# Patient Record
Sex: FEMALE | Race: BLACK OR AFRICAN AMERICAN | Employment: UNEMPLOYED | URBAN - METROPOLITAN AREA
[De-identification: names, ages, dates, MRNs, and addresses within clinical notes are randomized per-mention and may not be internally consistent; named-entity substitution may affect disease eponyms.]

---

## 2018-03-28 ENCOUNTER — OFFICE VISIT (OUTPATIENT)
Dept: FAMILY MEDICINE CLINIC | Age: 28
End: 2018-03-28

## 2018-03-28 VITALS
BODY MASS INDEX: 31.05 KG/M2 | HEIGHT: 56 IN | TEMPERATURE: 98.6 F | SYSTOLIC BLOOD PRESSURE: 123 MMHG | HEART RATE: 67 BPM | WEIGHT: 138 LBS | DIASTOLIC BLOOD PRESSURE: 64 MMHG

## 2018-03-28 DIAGNOSIS — G56.03 CARPAL TUNNEL SYNDROME ON BOTH SIDES: Primary | ICD-10-CM

## 2018-03-28 DIAGNOSIS — Z23 ENCOUNTER FOR IMMUNIZATION: ICD-10-CM

## 2018-03-28 DIAGNOSIS — G44.201 INTRACTABLE TENSION-TYPE HEADACHE, UNSPECIFIED CHRONICITY PATTERN: ICD-10-CM

## 2018-03-28 RX ORDER — IBUPROFEN 600 MG/1
600 TABLET ORAL 2 TIMES DAILY
Qty: 60 TAB | Refills: 0 | Status: SHIPPED | OUTPATIENT
Start: 2018-03-28

## 2018-03-28 RX ORDER — AMITRIPTYLINE HYDROCHLORIDE 10 MG/1
10 TABLET, FILM COATED ORAL
Qty: 30 TAB | Refills: 1 | Status: SHIPPED | OUTPATIENT
Start: 2018-03-28

## 2018-03-28 NOTE — PATIENT INSTRUCTIONS
Síndrome del chetan hodges: Instrucciones de cuidado - [ Carpal Tunnel Syndrome: Care Instructions ]  Instrucciones de cuidado    El síndrome del chetan hodges es un problema nervioso. Puede causar hormigueo, entumecimiento, debilidad o [employer] dedos, el pulgar y la [place]. El nervio mediano y varios tejidos fibrosos, llamados tendones, atraviesan la zuly por un espacio denominado chetan hodges. El movimiento repetido de las ruben al trabajar o practicar ciertos pasatiempos y deportes puede hacer presión sobre el nervio. El embarazo y ciertas afecciones médicas, edu la diabetes, la artritis y [place] tiroides hipoactiva, también pueden causar el síndrome del chetan hodges. Para reducir los síntomas, usted podría limitar [facility] o [facility]. También puede flash otras medidas para sentirse mejor. Si los síntomas son leves, es probable que pueda aliviar el dolor con 1 o 2 semanas de tratamiento en el [facility]. La cirugía es necesaria solo si otros tratamientos no funcionan. La atención de seguimiento es lila parte clave de robin tratamiento y seguridad. Asegúrese de hacer y acudir a todas las citas, y llame a robin médico si está teniendo problemas. También es lila buena idea saber los resultados de los exámenes y mantener lila lista de los medicamentos que kendall. ¿Cómo puede cuidarse en el hogar? · Si es posible, interrumpa o disminuya la actividad que causa los síntomas. Si no puede suspenderla, elayne pausas frecuentes para descansar y estirarse o cambie la posición de las ruben para hacer lila tarea. Trate de [facility], edu cuando [place] el ratón de lila computadora. · Trate de evitar doblar o rotar las muñecas. · Pregúntele a robin médico si puede flash un analgésico (medicamento para el dolor) de venta mayra, edu acetaminofén (Tylenol), ibuprofeno (Advil, Motrin) o naproxeno (Aleve). Sea conchis con los medicamentos. Becka y siga todas las instrucciones de la [place].   · Si robin médico le receta corticosteroides para ayudar a aliviar el dolor y reducir la hinchazón, tómelos exactamente edu le fueron recetados. Llame a robin médico si joshua estar teniendo problemas con robin medicamento. · Colóquese hielo o lila compresa fría sobre la [place] de 10 a 20 minutos cada vez para aliviar el dolor. Póngase un paño jean entre el hielo y la piel. · Si robin médico o robin fisioterapeuta o terapeuta ocupacional le indica que use lila tablilla (férula) para la [place], [place] según las indicaciones para mantener la [place] en lila posición neutra. [place] también reduce la presión sobre robin nervio mediano. · Pregúntele a robin médico si debería hacer sesiones de fisioterapia o de terapia ocupacional para aprender a hacer las tareas de [employer]. · [place] clases de yoga para estirar los músculos y fortalecer las ruben y las Amanda. El yoga ha [employer] síntomas del túnel carpiano. Cómo prevenir el síndrome del túnel carpiano  · Cuando trabaje en la computadora, R[address]e [address] y las muñecas alineadas con los antebrazos. Mantenga los codos cerca de lizzy costados. [place] un descanso con lila frecuencia de 10 a 15 minutos. · Trate de hacer los siguientes ejercicios:  ¨ Calentamiento: Gire la zuly hacia arriba, [place] y de un lado a otro. Repita esto 4 veces. Estire [employer] dedos, relájelos y vuelva a estirarlos. Repita 4 veces. Estire el pulgar doblándolo levemente hacia atrás, manténgalo en herrera posición y relájelo. Repita 4 veces. ¨ Estiramiento con los [employer] en posición de orar: Comience colocando las maliha de las ruben juntas darrion del pecho, juanito debajo del [place]. Baje las ruben lentamente hacia la línea de la cintura y manténgalas cerca del estómago con las maliha juntas hasta que sienta un estiramiento leve a moderado debajo de los antebrazos. Mantenga la posición entre 10 y 21 segundos. Repita 4 veces.   ¨ Estiramiento del flexor de la zuly: Extienda el Korey Schooner frente a usted, con la anthony [place]. Doble la zuly y apunte con la mano hacia el piso. Con la [employer], doble con suavidad la zuly aún más hasta que sienta un estiramiento entre leve y moderado en el antebrazo. Mantenga la posición entre 10 y 21 segundos. Repita 4 veces. ¨ Estiramiento del extensor de la zuly: Repita los pasos para el estiramiento del flexor de la zuly ramírez comience con la anthony extendida Raenelle Meckel. · Apriete lila pelota de goma varias veces al día para mantener steven las ruben y los dedos. · Evite sostener objetos (edu un libro) en la misma posición bennie mucho tiempo. Siempre que sea posible, utilice toda la mano para flash un objeto. Si [place] solo el pulgar y el dedo índice puede tensionar la [place]. · No fume. Puede empeorar esta afección ya que reduce el flujo de naye hacia el nervio El paso. Si necesita ayuda para dejar de fumar, hable con robin médico sobre programas y medicamentos para dejar de fumar. Estos pueden aumentar lizzy probabilidades de dejar el hábito para siempre. ¿Cuándo debe pedir ayuda? Preste especial atención a los cambios de robin kobe y asegúrese de comunicarse con robin médico si:  ? · El dolor u otros problemas no mejoran con los cuidados en el hogar. ? · Desea más información sobre la fisioterapia o la terapia ocupacional.   ? · Tiene efectos secundarios producidos por los corticosteroides, tales edu:  ¨ Aumento de [place]. ¨ Cambios en el estado de ánimo. ¨ Problemas para dormir. ¨ Fácil formación de moretones. ? · Tiene otros problemas con los medicamentos. ¿Dónde puede encontrar más información en inglés? Darin Bowen a http://xavier-fer.info/. [place] R432 en la búsqueda para aprender más acerca de \"Síndrome del túnel carpiano: Instrucciones de cuidado - [ Carpal Tunnel Syndrome: Care Instructions ]. \"  Revisado: 21 marzo, 2017  Versión del contenido: 11.4  © 1541-9292 Healthwise, Incorporated.  Las instrucciones de cuidado fueron adaptadas bajo licencia por [employer] (which disclaims liability or warranty for this information). Si usted tiene [place] afección médica o sobre estas instrucciones, siempre pregunte a robin profesional de kobe. [facility] niega toda garantía o responsabilidad por robin uso de esta información.

## 2018-03-28 NOTE — PROGRESS NOTES
At discharge station AVS was printed and reviewed with pt. Gave vaccine per protocol. Documented in [address]. Gave patient VIS information sheet and reviewed instructions regarding possible adverse side effects and allergic reactions. No adverse reaction noted at time of discharge.  Christiano Lyn RN

## 2018-03-28 NOTE — PROGRESS NOTES
Assessment/Plan:       ICD-10-CM ICD-9-CM    1. Carpal tunnel syndrome on both sides G56.03 354.0 ibuprofen (MOTRIN) 600 mg tablet   2. Intractable tension-type headache, unspecified chronicity pattern G44.201 339.10 amitriptyline (ELAVIL) 10 mg tablet   3. Encounter for immunization Z23 V03.89 INFLUENZA VIRUS VAC QUAD,SPLIT,PRESV FREE SYRINGE IM     Follow-up Disposition:  Return if symptoms worsen or fail to improve. [address] [facility], [address] [id number]  Phone: [phone] Fax: [phone]      [facility] [address] [facility]  Subjective:     Chief Complaint   Patient presents with    Headache    Numbness     both arms    Immunization/Injection    Austin Canseco is a 32 y.o. OTHER female. HPI:   Headache: takes tylenol but headache doesn't go away. Started 2 months ago. Back of her neck hurts and it feels \"hot\". Headache is more during the day. 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 Numbness, both arms: numbness at night, started 3 months ago. It is getting worse. Wakes her up. Nothing tried. The wood is light. Stands with her arms extended not quite to 90 degrees  8 hours a day. She  has no past medical history on file. Review of Systems: Negative for: fever, chest pain, shortness of breath, leg swelling, exertional dyspnea, palpitations. Current Medications:   No current outpatient prescriptions on file prior to visit. No current facility-administered medications on file prior to visit. Social History: She  reports that she has never smoked. She has never used smokeless tobacco. She reports that she does not drink alcohol or use illicit drugs. Objective:     Vitals:    03/28/18 0948   BP: 123/64   Pulse: 67   Temp: 98.6 °F (37 °C)   Weight: 138 lb (62.6 kg)   Height: 4' 8.5\" (1.435 m)    Patient's last menstrual period was 03/25/2018. Wt Readings from Last 2 Encounters:   03/28/18 138 lb (62.6 kg)     No results found for any visits on 03/28/18. Physical Examination:  General appearance - well developed, no acute distress. Chest - clear to auscultation. Heart - regular rate and rhythm without murmurs, rubs, or gallops. Abdomen - bowel sounds present x 4, NT, ND. Extremities - pulses intact. No peripheral edema. Assessment/Plan:   Diagnoses and all orders for this visit:    1. Carpal tunnel syndrome on both sides  -     ibuprofen (MOTRIN) 600 mg tablet; Take 1 Tab by mouth two (2) times a day. For pain. Thai sig    2. Intractable tension-type headache, unspecified chronicity pattern  -     amitriptyline (ELAVIL) 10 mg tablet; Take 1 Tab by mouth nightly. For headache; Thai sig    3. Encounter for immunization  -     Influenza virus vaccine (QUADRIVALENT PRES FREE SYRINGE) IM (24953)      Follow-up Disposition:  Return if symptoms worsen or fail to improve. Karen Degroot, DNP, FNP-BC, BC-ADM  Marie Wu expressed understanding of this plan.

## 2019-07-22 ENCOUNTER — HOSPITAL ENCOUNTER (EMERGENCY)
Age: 29
Discharge: HOME | End: 2019-07-22
Attending: EMERGENCY MEDICINE
Payer: COMMERCIAL

## 2019-07-22 ENCOUNTER — APPOINTMENT (OUTPATIENT)
Dept: CT IMAGING | Age: 29
End: 2019-07-22
Attending: PHYSICIAN ASSISTANT
Payer: COMMERCIAL

## 2019-07-22 VITALS
WEIGHT: 148 LBS | RESPIRATION RATE: 20 BRPM | TEMPERATURE: 99.2 F | HEART RATE: 95 BPM | DIASTOLIC BLOOD PRESSURE: 81 MMHG | OXYGEN SATURATION: 98 % | HEIGHT: 57 IN | SYSTOLIC BLOOD PRESSURE: 124 MMHG | BODY MASS INDEX: 31.93 KG/M2
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
## 2019-07-22 DIAGNOSIS — S09.90XA CLOSED HEAD INJURY, INITIAL ENCOUNTER: ICD-10-CM

## 2019-07-22 DIAGNOSIS — S01.01XA LACERATION OF SCALP, INITIAL ENCOUNTER: ICD-10-CM

## 2019-07-22 PROCEDURE — 70450 CT HEAD/BRAIN W/O DYE: CPT

## 2019-07-22 PROCEDURE — 90715 TDAP VACCINE 7 YRS/> IM: CPT | Performed by: PHYSICIAN ASSISTANT

## 2019-07-22 PROCEDURE — 75810000293 HC SIMP/SUPERF WND  RPR

## 2019-07-22 PROCEDURE — 90471 IMMUNIZATION ADMIN: CPT

## 2019-07-22 PROCEDURE — 74011250636 HC RX REV CODE- 250/636: Performed by: PHYSICIAN ASSISTANT

## 2019-07-22 PROCEDURE — 96372 THER/PROPH/DIAG INJ SC/IM: CPT

## 2019-07-22 PROCEDURE — 77030008460 HC STPLR SKN PRECIS 3M -A

## 2019-07-22 PROCEDURE — 75810000275 HC EMERGENCY DEPT VISIT NO LEVEL OF CARE

## 2019-07-22 PROCEDURE — 99284 EMERGENCY DEPT VISIT MOD MDM: CPT

## 2019-07-22 PROCEDURE — 77030018836 HC SOL IRR NACL ICUM -A

## 2019-07-22 RX ORDER — KETOROLAC TROMETHAMINE 30 MG/ML
30 INJECTION, SOLUTION INTRAMUSCULAR; INTRAVENOUS
Status: COMPLETED | OUTPATIENT
Start: 2019-07-22 | End: 2019-07-22

## 2019-07-22 RX ORDER — HYDROCODONE BITARTRATE AND ACETAMINOPHEN 5; 325 MG/1; MG/1
1 TABLET ORAL
Qty: 10 TAB | Refills: 0 | Status: SHIPPED | OUTPATIENT
Start: 2019-07-22 | End: 2019-07-25

## 2019-07-22 RX ORDER — IBUPROFEN 800 MG/1
800 TABLET ORAL
Qty: 20 TAB | Refills: 0 | Status: SHIPPED | OUTPATIENT
Start: 2019-07-22 | End: 2019-07-29

## 2019-07-22 RX ORDER — ONDANSETRON 4 MG/1
4 TABLET, ORALLY DISINTEGRATING ORAL
Qty: 15 TAB | Refills: 0 | Status: SHIPPED | OUTPATIENT
Start: 2019-07-22

## 2019-07-22 RX ORDER — HYDROCODONE BITARTRATE AND ACETAMINOPHEN 5; 325 MG/1; MG/1
1 TABLET ORAL
Status: DISCONTINUED | OUTPATIENT
Start: 2019-07-22 | End: 2019-07-23 | Stop reason: HOSPADM

## 2019-07-22 RX ADMIN — TETANUS TOXOID, REDUCED DIPHTHERIA TOXOID AND ACELLULAR PERTUSSIS VACCINE, ADSORBED 0.5 ML: 5; 2.5; 8; 8; 2.5 SUSPENSION INTRAMUSCULAR at 22:03

## 2019-07-22 RX ADMIN — KETOROLAC TROMETHAMINE 30 MG: 30 INJECTION, SOLUTION INTRAMUSCULAR; INTRAVENOUS at 20:34
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
## 2019-07-22 NOTE — LETTER
[facility] EMERGENCY DEPT 
[address] [address] 
[phone] Work/School Note Date: 7/22/2019 To Whom It May concern: 
 
Ai De Jesus was seen and treated today in the emergency room by the following provider(s): 
Attending Provider: Monica Davis MD 
Physician Assistant: Carmelo Flores. Ai De Jesus may return to work in 3 days Sincerely, 
 
 
 
 
RBEANNA Fountain

## 2019-07-22 NOTE — ED NOTES
Forensics RN, Enrrique Denton, states it may be 45 mins before she arrives in ED.   Chelsey Martinez stated she will call ART
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
## 2019-07-23 NOTE — DISCHARGE INSTRUCTIONS
Patient Education        Aprenda sobre lila lesión cerrada en la [place] - [ Kathleen Zeigler About a Closed Head Injury ]  ¿Qué es lila lesión cerrada en Dani Ag? Lila lesión cerrada en la shyanne ocurre cuando la shyanne recibe un golpe remington. La intensa fuerza del golpe hace que el cerebro se sacuda dentro del cráneo. Jenae movimiento puede hacer que el cerebro se magulle, se hinche o se desgarre. A veces, los nervios o los vasos sanguíneos también se dañan. [place] puede provocar sangrado dentro del cerebro o alrededor de él. [place] conmoción cerebral es un tipo de lesión cerrada en la shyanne. ¿Cuáles son los síntomas? Si usted tiene lila conmoción leve, podría tener un ligero dolor de shyanne o sentirse \"no del todo jaswinder\". Estos síntomas son comunes. Suelen desaparecer entre unos pocos días y 4 semanas después. Bakari, a veces, es posible que después de lila conmoción cerebral usted se sienta edu si no pudiera funcionar nguyen jaswinder edu antes de la lesión. Y tiene nuevos síntomas. [place] se llama síndrome posconmocional. Usted puede:  · Tener lila mayor dificultad para resolver problemas, pensar, concentrarse o recordar. · [employer] de Nano Clemons. · Tener cambios en lizzy patrones de sueño, edu no poder dormir o dormir todo el tiempo. · Tener cambios de personalidad. · No tener interés en las actividades habituales. · Sentirse enojado o ansioso sin motivo hansel. · Perder el sentido del gusto o del olfato. · Estar mareado, aturdido o con dificultades para mantener el equilibrio. Podría resultarle difícil estar de pie o caminar. ¿Cómo se trata lila lesión cerrada en la shyanne? Cualquier persona que pueda tener lila conmoción cerebral necesita ricarda a un médico. Algunas personas tienen que permanecer en el hospital para estar en observación. Otras pueden volver a robin hogar de manera parker.  Si usted vuelve a robin hogar, siga las instrucciones de robin médico. Él o kerwin le dirá si necesita que alguien lo vigile atentamente [employer] siguientes 24 horas o [employer]. El descanso es el mejor tratamiento. Duerma lo suficiente por la noche. Y trate de descansar bennie el día. · Evite las actividades que requieran un esfuerzo físico o mental. Estas incluyen las tareas domésticas y escolares, así edu el ejercicio. Y evite jugar videojuegos, enviar mensajes de texto o usar la computadora. Es posible que deba cambiar robin horario escolar o laboral para poder evitar estas actividades. · Pregúntele a robin médico cuándo va a poder conducir, montar en bicicleta u operar maquinaria. · [place] un analgésico (medicamento para el dolor) de venta mayra, edu acetaminofén (Tylenol), ibuprofeno (Advil, Motrin) o naproxeno (Aleve). Sea conchis con los medicamentos. Becka y siga todas las instrucciones de la [place]. · Consulte a robin médico antes de flash cualquier otro medicamento para el dolor. · No nancy alcohol ni consuma drogas ilegales. Pueden retrasar la sanación. También pueden aumentar el riesgo de sufrir lila segunda lesión en la shyanne. La atención de seguimiento es lila parte clave de robin tratamiento y seguridad. Asegúrese de hacer y acudir a todas las citas, y llame a robin médico si está teniendo problemas. También es lila buena idea saber los resultados de lizzy exámenes y mantener lila lista de los medicamentos que kendall. ¿Dónde puede encontrar más información en inglés? Muna Kemp a http://xavier-fer.info/. Escriba E235 en la búsqueda para aprender más acerca de \"Aprenda sobre lila lesión cerrada en la [place] - [ Learning About a Closed Head Injury ]. \"  Revisado: 28 marzo, 2019  Versión del contenido: 12.1  © 8702-4209 Healthwise, Incorporated. Las instrucciones de cuidado fueron adaptadas bajo licencia por Good Help Connections (which disclaims liability or warranty for this information). Si usted tiene [place] afección médica o sobre estas instrucciones, siempre pregunte a robin profesional de kobe.  [employer] niega toda garantía o responsabilidad por robin uso de esta información. Patient Education        Ana Luisa Fuller con grapas: Instrucciones de cuidado - [ Cuts Closed With Staples: Care Instructions ]  Instrucciones de cuidado  Un brandi puede ocurrir en cualquier parte del cuerpo. El médico utilizó grapas para cerrar el brandi. Las grapas sirven para cerrar el brandi fácil y rápidamente, lo que ayuda a sanar el brandi. A veces, un brandi puede lesionar tendones, vasos sanguíneos o nervios. Si el brandi fue profundo y a través de la piel, el médico puede neri hecho lila capa de puntos por debajo de las grapas. En la capa más profunda de puntos, se juntan las partes profundas del brandi. Esos puntos se disolverán, y no es necesario quitarlos. Las grapas de la capa superior son lo que ve en el brandi. Es posible que le pongan lila venda. Será necesario que [employer] grapas, por lo general entre 7 y 15 días. El médico lo spann revisado [facility] problemas pueden aparecer más tarde. Si observa algún problema o un síntoma nuevo, obtenga tratamiento médico de inmediato. La atención de seguimiento es lila parte clave de robin tratamiento y seguridad. Asegúrese de hacer y acudir a todas las citas, y llame a robin médico si está teniendo problemas. También es lila buena idea saber los resultados de lizzy exámenes y mantener lila lista de los medicamentos que kendall. ¿Cómo puede cuidarse en el hogar? · Mantenga seco el brandi bennie las primeras 24 o 50 horas. Después de esto, puede ducharse si robin médico lo aprueba. Seque el brandi con toques suaves de toalla. · No sumerja el brandi, edu, por ejemplo, en lila bañera. Robin médico le dirá cuándo es seguro mojar el brandi. · Si robin médico le dijo cómo cuidarse el brandi, siga las instrucciones de robin médico. Si no le shawn instrucciones, siga estos consejos generales:  ? Después de las primeras 24 a 48 horas, lave la annelise alrededor del brandi con agua limpia 2 veces al día.  No use peróxido de hidrógeno [place]) ni alcohol, los cuales pueden retrasar la sanación. ? Puede cubrir el brandi con lila capa delgada de vaselina y Julia Beery venda no adherente. ? Aplíquese más vaselina y [facility] la venda según sea necesario. · Evite cualquier actividad que podría hacer que el brandi se vuelva a abrir. · No se quite las grapas usted mismo. Torres médico le dirá cuándo regresar para que le quiten las grapas. · [employer] analgésicos (medicamentos para el dolor) exactamente según lo indicado. ? Si el [employer] shawn un analgésico recetado, [employer] se lo recetó.  ? Si usted no está tomando un analgésico recetado, pregúntele a torres médico si puede flash un medicamento de [employer]. ¿Cuándo debe pedir ayuda? Llame a torres médico ahora mismo o busque atención médica inmediata si:    · Siente un dolor nuevo o el dolor empeora.     · La piel cercana al brandi está fría o pálida, o cambia de color.     · Siente hormigueo, debilitamiento o entumecimiento cerca del brandi.     · El brandi comienza a sangrar, y la naye empapa la venda. Es normal que salgan pequeñas cantidades de [place].     · Tiene dificultades para  la annelise cercana al brandi.     · Tiene síntomas de infección, tales edu:  ? Aumento del dolor, la hinchazón, el enrojecimiento o la temperatura alrededor del brandi. ? Vetas rojizas que salen del brandi. ? Pus que sale del brandi. ? [employer] Dash especial atención a los cambios en torres kobe y asegúrese de comunicarse con torres médico si:    · No mejora edu se esperaba. ¿Dónde puede encontrar más información en inglés? Brandy Gomez a http://xavier-fer.info/. Aislinn Mcdonald U881 en la búsqueda para aprender más acerca de \"[place] cerrados con grapas: Instrucciones de cuidado - [ Cuts Closed With Staples: Care Instructions ]. \"  Revisado: 23 septiembre, 2018  Versión del contenido: 12.1  © 6116-2031 Healthwise, Incorporated.  Las instrucciones de cuidado fueron adaptadas bajo licencia por [facility] (which disclaims liability or warranty for this information). Si usted tiene [place] afección médica o sobre estas instrucciones, siempre pregunte a robin profesional de kobe. [facility] niega toda garantía o responsabilidad por robin uso de esta información.

## 2019-07-23 NOTE — ED NOTES
Discharge instructions were given to the patient by Silvia Umaña.     The patient left the Emergency Department ambulatory, alert and oriented and in no acute distress with 3 prescriptions. The patient was encouraged to call or return to the ED for worsening issues or problems and was encouraged to schedule a follow up appointment for continuing care. The patient verbalized understanding of discharge instructions and prescriptions, all questions were answered. The patient has no further concerns at this time.
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
## 2019-08-04 ENCOUNTER — HOSPITAL ENCOUNTER (EMERGENCY)
Age: 29
Discharge: HOME | End: 2019-08-04
Attending: EMERGENCY MEDICINE
Payer: SELF-PAY

## 2019-08-04 VITALS
TEMPERATURE: 98 F | HEART RATE: 72 BPM | SYSTOLIC BLOOD PRESSURE: 113 MMHG | WEIGHT: 144.31 LBS | RESPIRATION RATE: 16 BRPM | OXYGEN SATURATION: 99 % | BODY MASS INDEX: 31.13 KG/M2 | DIASTOLIC BLOOD PRESSURE: 66 MMHG | HEIGHT: 57 IN

## 2019-08-04 DIAGNOSIS — Z48.02 ENCOUNTER FOR STAPLE REMOVAL: Primary | ICD-10-CM

## 2019-08-04 PROCEDURE — 75810000275 HC EMERGENCY DEPT VISIT NO LEVEL OF CARE
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
## 2019-08-04 NOTE — ED NOTES
Pt tolerated procedure and accepted plan of care. Pt left unit steady gait. No bleeding noted at site. Patient (s)  given copy of dc instructions and 0 script(s). Patient (s)  verbalized understanding of instructions and script (s). Patient given a current medication reconciliation form and verbalized understanding of their medications. Patient (s) verbalized understanding of the importance of discussing medications with  his or her physician or clinic they will be following up with. Patient alert and oriented and in no acute distress. Patient discharged home ambulatory with self.

## 2019-08-04 NOTE — ED NOTES
Pt here for removal of  5 staples to her head. Pt is A+Ox3 clear speaking. Emergency Department Nursing Plan of Care       The Nursing Plan of Care is developed from the Nursing assessment and Emergency Department Attending provider initial evaluation. The plan of care may be reviewed in the ED Provider note.     The Plan of Care was developed with the following considerations:   Patient / Family readiness to learn indicated by:verbalized understanding  Persons(s) to be included in education: patient  Barriers to Learning/Limitations:No    Signed     Chad Trinidad RN    8/4/2019   1:28 PM

## 2019-08-04 NOTE — DISCHARGE INSTRUCTIONS
Patient Education        [place]era [place] de la retirada de puntos de sutura y grapas - [ [place] and [place]ecenia Removal ]  ¿Cuándo se retiran los puntos de sutura y las grapas? Robin médico le indicará cuándo deben [employer] puntos de sutura o las grapas, por lo general al cabo de 7 a 14 shaina. El tiempo que tenga que esperar dependerá de cosas edu la ubicación de la herida, el tamaño y la profundidad de la herida y robin estado de kobe general. No se quite los puntos de sutura usted mismo. Los puntos en la chelle se suelen retirar al cabo de lila semana. Bakari los puntos y las grapas en otras zonas del cuerpo, edu en la espalda o el abdomen o sobre lila articulación, podrían tener que permanecer en robin lugar por más [place], frecuentemente lila o [place]. Asegúrese de seguir las instrucciones de robin médico.  ¿Cómo se retiran los puntos de sutura y las grapas? Generalmente no duele cuando el médico retira los puntos de sutura o las grapas. Usted puede sentir un pequeño tirón cuando se saca cada punto o grapa. · Estará sentado o recostado. · Para retirar los puntos de sutura, el médico utilizará tijeras para cortar cada kyle de los nudos y [employer] [facility] hilos. · Para retirar las grapas, el médico usará un instrumento para sacar las grapas lila por [place]. · La annelise puede estar sensible después de neri retirado los puntos de sutura o las grapas. Bakari debería sentirse mejor al cabo de unos minutos o después de algunas horas. ¿Qué puede esperar después de que le retiren los puntos de sutura o las grapas? Dependiendo del tipo de brandi y de robin ubicación, usted tendrá lila cicatriz. Las cicatrices por lo general son menos visibles con el tiempo. Mantenga limpia la annelise, bakari no se necesita lila venda. ¿Cuándo debe pedir ayuda? Llame a robin médico ahora mismo o busque atención médica inmediata si:  · Siente un dolor nuevo o el dolor empeora.   · Tiene dificultades para  la annelise que rodea la cicatriz. · Tiene síntomas de infección, tales edu:  ? Mayor dolor, hinchazón, enrojecimiento o aumento de la temperatura alrededor de la cicatriz. ? Vetas rojizas que salen de la cicatriz. ? Pus que sale de la cicatriz. ? Fiebre. Preste especial atención a los cambios en robin kobe y asegúrese de comunicarse con robin médico si:  · Se le abre la cicatriz. · No mejora edu se esperaba. La atención de seguimiento es lila parte clave de robin tratamiento y seguridad. Asegúrese de hacer y acudir a todas las citas, y llame a robin médico si está teniendo problemas. También es lila buena idea mantener lila lista de los medicamentos que kendall. ¿Dónde puede encontrar más información en inglés? Franky Arroyo a http://xavier-fer.info/. Fan O814 en la búsqueda para aprender más acerca de \"Aprenda acerca de la retirada de puntos de sutura y grapas - [ Learning About Stitches and Staples Removal ]. \"  Revisado: 23 septiembre, 2018  Versión del contenido: 12.1  © 6092-8752 Healthwise, Incorporated. Las instrucciones de cuidado fueron adaptadas bajo licencia por Good [employer] Connections (which disclaims liability or warranty for this information). Si usted tiene [place] afección médica o sobre estas instrucciones, siempre pregunte a robin profesional de kobe. Healthwise, Incorporated niega toda garantía o responsabilidad por robin uso de esta información.

## 2024-01-26 ENCOUNTER — HOSPITAL ENCOUNTER (INPATIENT)
Facility: HOSPITAL | Age: 34
LOS: 2 days | Discharge: HOME | DRG: 813 | End: 2024-01-29
Attending: EMERGENCY MEDICINE | Admitting: FAMILY MEDICINE

## 2024-01-26 ENCOUNTER — APPOINTMENT (OUTPATIENT)
Facility: HOSPITAL | Age: 34
DRG: 813 | End: 2024-01-26

## 2024-01-26 DIAGNOSIS — E11.65 TYPE 2 DIABETES MELLITUS WITH HYPERGLYCEMIA (HCC): ICD-10-CM

## 2024-01-26 DIAGNOSIS — R31.21 ASYMPTOMATIC MICROSCOPIC HEMATURIA: ICD-10-CM

## 2024-01-26 DIAGNOSIS — R10.31 RIGHT LOWER QUADRANT ABDOMINAL PAIN: ICD-10-CM

## 2024-01-26 DIAGNOSIS — D69.6 THROMBOCYTOPENIA (HCC): Primary | ICD-10-CM

## 2024-01-26 LAB
ALBUMIN SERPL-MCNC: 3.8 G/DL (ref 3.5–5)
ALBUMIN/GLOB SERPL: 0.8 (ref 1.1–2.2)
ALP SERPL-CCNC: 120 U/L (ref 45–117)
ALT SERPL-CCNC: 30 U/L (ref 12–78)
AMPHET UR QL SCN: NEGATIVE
ANION GAP SERPL CALC-SCNC: 5 MMOL/L (ref 5–15)
APPEARANCE UR: CLEAR
AST SERPL-CCNC: 17 U/L (ref 15–37)
BACTERIA URNS QL MICRO: NEGATIVE /HPF
BARBITURATES UR QL SCN: NEGATIVE
BENZODIAZ UR QL: NEGATIVE
BILIRUB SERPL-MCNC: 0.4 MG/DL (ref 0.2–1)
BILIRUB UR QL: NEGATIVE
BUN SERPL-MCNC: 14 MG/DL (ref 6–20)
BUN/CREAT SERPL: 27 (ref 12–20)
CALCIUM SERPL-MCNC: 8.6 MG/DL (ref 8.5–10.1)
CANNABINOIDS UR QL SCN: NEGATIVE
CHLORIDE SERPL-SCNC: 107 MMOL/L (ref 97–108)
CO2 SERPL-SCNC: 25 MMOL/L (ref 21–32)
COCAINE UR QL SCN: NEGATIVE
COLOR UR: ABNORMAL
COMMENT:: NORMAL
CREAT SERPL-MCNC: 0.51 MG/DL (ref 0.55–1.02)
EPITH CASTS URNS QL MICRO: ABNORMAL /LPF
ETHANOL SERPL-MCNC: <10 MG/DL (ref 0–0.08)
GLOBULIN SER CALC-MCNC: 4.5 G/DL (ref 2–4)
GLUCOSE SERPL-MCNC: 130 MG/DL (ref 65–100)
GLUCOSE UR STRIP.AUTO-MCNC: NEGATIVE MG/DL
HCG UR QL: NEGATIVE
HGB UR QL STRIP: ABNORMAL
KETONES UR QL STRIP.AUTO: NEGATIVE MG/DL
LDH SERPL L TO P-CCNC: 186 U/L (ref 81–246)
LEUKOCYTE ESTERASE UR QL STRIP.AUTO: NEGATIVE
LIPASE SERPL-CCNC: 33 U/L (ref 13–75)
Lab: NORMAL
METHADONE UR QL: NEGATIVE
NITRITE UR QL STRIP.AUTO: NEGATIVE
OPIATES UR QL: NEGATIVE
PCP UR QL: NEGATIVE
PH UR STRIP: 6 (ref 5–8)
POTASSIUM SERPL-SCNC: 3.5 MMOL/L (ref 3.5–5.1)
PROT SERPL-MCNC: 8.3 G/DL (ref 6.4–8.2)
PROT UR STRIP-MCNC: NEGATIVE MG/DL
RBC #/AREA URNS HPF: ABNORMAL /HPF (ref 0–5)
SODIUM SERPL-SCNC: 137 MMOL/L (ref 136–145)
SP GR UR REFRACTOMETRY: 1.02 (ref 1–1.03)
SPECIMEN HOLD: NORMAL
SPECIMEN HOLD: NORMAL
UROBILINOGEN UR QL STRIP.AUTO: 0.2 EU/DL (ref 0.2–1)
WBC URNS QL MICRO: ABNORMAL /HPF (ref 0–4)

## 2024-01-26 PROCEDURE — 99285 EMERGENCY DEPT VISIT HI MDM: CPT

## 2024-01-26 PROCEDURE — 6370000000 HC RX 637 (ALT 250 FOR IP): Performed by: STUDENT IN AN ORGANIZED HEALTH CARE EDUCATION/TRAINING PROGRAM

## 2024-01-26 PROCEDURE — 36415 COLL VENOUS BLD VENIPUNCTURE: CPT

## 2024-01-26 PROCEDURE — 83615 LACTATE (LD) (LDH) ENZYME: CPT

## 2024-01-26 PROCEDURE — 80307 DRUG TEST PRSMV CHEM ANLYZR: CPT

## 2024-01-26 PROCEDURE — 81025 URINE PREGNANCY TEST: CPT

## 2024-01-26 PROCEDURE — 96374 THER/PROPH/DIAG INJ IV PUSH: CPT

## 2024-01-26 PROCEDURE — 6360000004 HC RX CONTRAST MEDICATION: Performed by: RADIOLOGY

## 2024-01-26 PROCEDURE — 83690 ASSAY OF LIPASE: CPT

## 2024-01-26 PROCEDURE — 80053 COMPREHEN METABOLIC PANEL: CPT

## 2024-01-26 PROCEDURE — 6360000002 HC RX W HCPCS: Performed by: STUDENT IN AN ORGANIZED HEALTH CARE EDUCATION/TRAINING PROGRAM

## 2024-01-26 PROCEDURE — 82077 ASSAY SPEC XCP UR&BREATH IA: CPT

## 2024-01-26 PROCEDURE — 85025 COMPLETE CBC W/AUTO DIFF WBC: CPT

## 2024-01-26 PROCEDURE — 74177 CT ABD & PELVIS W/CONTRAST: CPT

## 2024-01-26 PROCEDURE — 81001 URINALYSIS AUTO W/SCOPE: CPT

## 2024-01-26 RX ORDER — ONDANSETRON 2 MG/ML
4 INJECTION INTRAMUSCULAR; INTRAVENOUS ONCE
Status: COMPLETED | OUTPATIENT
Start: 2024-01-26 | End: 2024-01-26

## 2024-01-26 RX ORDER — ACETAMINOPHEN 500 MG
1000 TABLET ORAL
Status: COMPLETED | OUTPATIENT
Start: 2024-01-26 | End: 2024-01-26

## 2024-01-26 RX ADMIN — ACETAMINOPHEN 1000 MG: 500 TABLET ORAL at 18:31

## 2024-01-26 RX ADMIN — IOPAMIDOL 100 ML: 755 INJECTION, SOLUTION INTRAVENOUS at 19:14

## 2024-01-26 RX ADMIN — ONDANSETRON 4 MG: 2 INJECTION INTRAMUSCULAR; INTRAVENOUS at 18:31

## 2024-01-26 ASSESSMENT — PAIN DESCRIPTION - FREQUENCY: FREQUENCY: CONTINUOUS

## 2024-01-26 ASSESSMENT — PAIN DESCRIPTION - ORIENTATION
ORIENTATION: ANTERIOR
ORIENTATION: RIGHT

## 2024-01-26 ASSESSMENT — PAIN DESCRIPTION - PAIN TYPE: TYPE: ACUTE PAIN

## 2024-01-26 ASSESSMENT — PAIN DESCRIPTION - DESCRIPTORS
DESCRIPTORS: ACHING
DESCRIPTORS: ACHING

## 2024-01-26 ASSESSMENT — PAIN DESCRIPTION - ONSET: ONSET: PROGRESSIVE

## 2024-01-26 ASSESSMENT — PAIN SCALES - GENERAL
PAINLEVEL_OUTOF10: 7
PAINLEVEL_OUTOF10: 8

## 2024-01-26 ASSESSMENT — PAIN DESCRIPTION - LOCATION
LOCATION: ABDOMEN
LOCATION: ABDOMEN

## 2024-01-26 NOTE — ED TRIAGE NOTES
Triage: Pt arrives ambulatory from home with CC of abdominal bruising, abdominal bloating, and abdominal pain. She reports she has had these symptoms for 5 days. Pt reports some constipation. Endorses nausea without vomiting.

## 2024-01-26 NOTE — ED PROVIDER NOTES
are mis-transcribed.)    MARILYN MEDINA (electronically signed)  Emergency Attending Physician / Physician Assistant / Nurse Practitioner    [facility] Serve Consult for Admission  11:58 PM    ED Room Number: R35/R35  Patient Name and age:  Aixa Barkley 33 y.o.  female  Working Diagnosis:   1. Thrombocytopenia (HCC)    2. Right lower quadrant abdominal pain    3. Asymptomatic microscopic hematuria        COVID-19 Suspicion: No  Sepsis present:  No  Reassessment needed: No  Code Status:  Full Code  Readmission: No  Isolation Requirements: no  Recommended Level of Care: med/surg  Department: [facility] Adult ED - [phone]    Other:  33 y.o. female presents to ED with abdominal pain. Patient reports that for the past 3 days she has had abdominal pain and bloating. She reports that the pain was initially to the right side but now is \"all over\", constant in nature. She also notes increased bruising especially to her stomach for the past 5 days. She denies any inciting injury or trauma. PE notable for two ecchymosis to R side. Labs notable for platelets of 5. Patient with no history of the same. Labs otherwise unremarkable. Notable for hematuria and CT scan showed possible blood products in pelvis. Possible ITP vs TTP             Delma Andrew PA  01/26/24 0341

## 2024-01-27 PROBLEM — D69.6 THROMBOCYTOPENIA (HCC): Status: ACTIVE | Noted: 2024-01-27

## 2024-01-27 LAB
BASOPHILS # BLD: 0.1 K/UL (ref 0–0.1)
BASOPHILS # BLD: 0.1 K/UL (ref 0–0.1)
BASOPHILS NFR BLD: 1 % (ref 0–1)
BASOPHILS NFR BLD: 1 % (ref 0–1)
COMMENT:: NORMAL
DIFFERENTIAL METHOD BLD: ABNORMAL
DIFFERENTIAL METHOD BLD: ABNORMAL
EOSINOPHIL # BLD: 0 K/UL (ref 0–0.4)
EOSINOPHIL # BLD: 0.3 K/UL (ref 0–0.4)
EOSINOPHIL NFR BLD: 0 % (ref 0–7)
EOSINOPHIL NFR BLD: 3 % (ref 0–7)
ERYTHROCYTE [DISTWIDTH] IN BLOOD BY AUTOMATED COUNT: 12.8 % (ref 11.5–14.5)
ERYTHROCYTE [DISTWIDTH] IN BLOOD BY AUTOMATED COUNT: 12.9 % (ref 11.5–14.5)
FOLATE SERPL-MCNC: 40.4 NG/ML (ref 5–21)
HAPTOGLOB SERPL-MCNC: 178 MG/DL (ref 30–200)
HCT VFR BLD AUTO: 34.1 % (ref 35–47)
HCT VFR BLD AUTO: 34.8 % (ref 35–47)
HGB BLD-MCNC: 11.7 G/DL (ref 11.5–16)
HGB BLD-MCNC: 12 G/DL (ref 11.5–16)
IMM GRANULOCYTES # BLD AUTO: 0 K/UL
IMM GRANULOCYTES # BLD AUTO: 0.1 K/UL (ref 0–0.04)
IMM GRANULOCYTES NFR BLD AUTO: 0 %
IMM GRANULOCYTES NFR BLD AUTO: 1 % (ref 0–0.5)
INR PPP: 1 (ref 0.9–1.1)
LYMPHOCYTES # BLD: 1.8 K/UL (ref 0.8–3.5)
LYMPHOCYTES # BLD: 3.9 K/UL (ref 0.8–3.5)
LYMPHOCYTES NFR BLD: 18 % (ref 12–49)
LYMPHOCYTES NFR BLD: 38 % (ref 12–49)
MCH RBC QN AUTO: 29.4 PG (ref 26–34)
MCH RBC QN AUTO: 29.6 PG (ref 26–34)
MCHC RBC AUTO-ENTMCNC: 34.3 G/DL (ref 30–36.5)
MCHC RBC AUTO-ENTMCNC: 34.5 G/DL (ref 30–36.5)
MCV RBC AUTO: 85.3 FL (ref 80–99)
MCV RBC AUTO: 86.3 FL (ref 80–99)
MONOCYTES # BLD: 0.1 K/UL (ref 0–1)
MONOCYTES # BLD: 0.5 K/UL (ref 0–1)
MONOCYTES NFR BLD: 1 % (ref 5–13)
MONOCYTES NFR BLD: 5 % (ref 5–13)
NEUTS BAND NFR BLD MANUAL: 1 % (ref 0–6)
NEUTS SEG # BLD: 5.3 K/UL (ref 1.8–8)
NEUTS SEG # BLD: 8.1 K/UL (ref 1.8–8)
NEUTS SEG NFR BLD: 52 % (ref 32–75)
NEUTS SEG NFR BLD: 79 % (ref 32–75)
NRBC # BLD: 0 K/UL (ref 0–0.01)
NRBC # BLD: 0 K/UL (ref 0–0.01)
NRBC BLD-RTO: 0 PER 100 WBC
NRBC BLD-RTO: 0 PER 100 WBC
PATH REV BLD -IMP: ABNORMAL
PLATELET # BLD AUTO: 5 K/UL (ref 150–400)
PLATELET # BLD AUTO: 6 K/UL (ref 150–400)
PROTHROMBIN TIME: 10.8 SEC (ref 9–11.1)
RBC # BLD AUTO: 3.95 M/UL (ref 3.8–5.2)
RBC # BLD AUTO: 4.08 M/UL (ref 3.8–5.2)
RBC MORPH BLD: ABNORMAL
RETICS # AUTO: 0.12 M/UL (ref 0.02–0.08)
RETICS/RBC NFR AUTO: 3 % (ref 0.7–2.1)
SPECIMEN HOLD: NORMAL
TSH SERPL DL<=0.05 MIU/L-ACNC: 1.13 UIU/ML (ref 0.36–3.74)
VIT B12 SERPL-MCNC: >2000 PG/ML (ref 193–986)
WBC # BLD AUTO: 10.1 K/UL (ref 3.6–11)
WBC # BLD AUTO: 10.2 K/UL (ref 3.6–11)

## 2024-01-27 PROCEDURE — 6360000002 HC RX W HCPCS: Performed by: STUDENT IN AN ORGANIZED HEALTH CARE EDUCATION/TRAINING PROGRAM

## 2024-01-27 PROCEDURE — 2580000003 HC RX 258: Performed by: FAMILY MEDICINE

## 2024-01-27 PROCEDURE — 85397 CLOTTING FUNCT ACTIVITY: CPT

## 2024-01-27 PROCEDURE — 2060000000 HC ICU INTERMEDIATE R&B

## 2024-01-27 PROCEDURE — 6360000002 HC RX W HCPCS: Performed by: INTERNAL MEDICINE

## 2024-01-27 PROCEDURE — 85610 PROTHROMBIN TIME: CPT

## 2024-01-27 PROCEDURE — 82746 ASSAY OF FOLIC ACID SERUM: CPT

## 2024-01-27 PROCEDURE — 85025 COMPLETE CBC W/AUTO DIFF WBC: CPT

## 2024-01-27 PROCEDURE — 82607 VITAMIN B-12: CPT

## 2024-01-27 PROCEDURE — 85045 AUTOMATED RETICULOCYTE COUNT: CPT

## 2024-01-27 PROCEDURE — 84443 ASSAY THYROID STIM HORMONE: CPT

## 2024-01-27 PROCEDURE — 83010 ASSAY OF HAPTOGLOBIN QUANT: CPT

## 2024-01-27 PROCEDURE — 99222 1ST HOSP IP/OBS MODERATE 55: CPT | Performed by: INTERNAL MEDICINE

## 2024-01-27 PROCEDURE — 36415 COLL VENOUS BLD VENIPUNCTURE: CPT

## 2024-01-27 PROCEDURE — 2580000003 HC RX 258: Performed by: STUDENT IN AN ORGANIZED HEALTH CARE EDUCATION/TRAINING PROGRAM

## 2024-01-27 RX ORDER — ONDANSETRON 2 MG/ML
4 INJECTION INTRAMUSCULAR; INTRAVENOUS EVERY 6 HOURS PRN
Status: DISCONTINUED | OUTPATIENT
Start: 2024-01-27 | End: 2024-01-29 | Stop reason: HOSPADM

## 2024-01-27 RX ORDER — SODIUM CHLORIDE 9 MG/ML
INJECTION, SOLUTION INTRAVENOUS PRN
Status: DISCONTINUED | OUTPATIENT
Start: 2024-01-27 | End: 2024-01-29 | Stop reason: HOSPADM

## 2024-01-27 RX ORDER — MAGNESIUM SULFATE IN WATER 40 MG/ML
2000 INJECTION, SOLUTION INTRAVENOUS PRN
Status: DISCONTINUED | OUTPATIENT
Start: 2024-01-27 | End: 2024-01-29 | Stop reason: HOSPADM

## 2024-01-27 RX ORDER — POLYETHYLENE GLYCOL 3350 17 G/17G
17 POWDER, FOR SOLUTION ORAL DAILY PRN
Status: DISCONTINUED | OUTPATIENT
Start: 2024-01-27 | End: 2024-01-29 | Stop reason: HOSPADM

## 2024-01-27 RX ORDER — POTASSIUM CHLORIDE 750 MG/1
40 TABLET, FILM COATED, EXTENDED RELEASE ORAL PRN
Status: DISCONTINUED | OUTPATIENT
Start: 2024-01-27 | End: 2024-01-29 | Stop reason: HOSPADM

## 2024-01-27 RX ORDER — POTASSIUM CHLORIDE 7.45 MG/ML
10 INJECTION INTRAVENOUS PRN
Status: DISCONTINUED | OUTPATIENT
Start: 2024-01-27 | End: 2024-01-29 | Stop reason: HOSPADM

## 2024-01-27 RX ORDER — ACETAMINOPHEN 650 MG/1
650 SUPPOSITORY RECTAL EVERY 6 HOURS PRN
Status: DISCONTINUED | OUTPATIENT
Start: 2024-01-27 | End: 2024-01-29 | Stop reason: HOSPADM

## 2024-01-27 RX ORDER — SODIUM CHLORIDE 9 MG/ML
INJECTION, SOLUTION INTRAVENOUS PRN
Status: DISCONTINUED | OUTPATIENT
Start: 2024-01-27 | End: 2024-01-27

## 2024-01-27 RX ORDER — ONDANSETRON 4 MG/1
4 TABLET, ORALLY DISINTEGRATING ORAL EVERY 8 HOURS PRN
Status: DISCONTINUED | OUTPATIENT
Start: 2024-01-27 | End: 2024-01-29 | Stop reason: HOSPADM

## 2024-01-27 RX ORDER — ACETAMINOPHEN 325 MG/1
650 TABLET ORAL EVERY 6 HOURS PRN
Status: DISCONTINUED | OUTPATIENT
Start: 2024-01-27 | End: 2024-01-29 | Stop reason: HOSPADM

## 2024-01-27 RX ORDER — SODIUM CHLORIDE 0.9 % (FLUSH) 0.9 %
5-40 SYRINGE (ML) INJECTION PRN
Status: DISCONTINUED | OUTPATIENT
Start: 2024-01-27 | End: 2024-01-29 | Stop reason: HOSPADM

## 2024-01-27 RX ORDER — SODIUM CHLORIDE 0.9 % (FLUSH) 0.9 %
5-40 SYRINGE (ML) INJECTION EVERY 12 HOURS SCHEDULED
Status: DISCONTINUED | OUTPATIENT
Start: 2024-01-27 | End: 2024-01-29 | Stop reason: HOSPADM

## 2024-01-27 RX ADMIN — IMMUNE GLOBULIN (HUMAN) 45 G: 10 INJECTION INTRAVENOUS; SUBCUTANEOUS at 14:25

## 2024-01-27 RX ADMIN — SODIUM CHLORIDE, PRESERVATIVE FREE 10 ML: 5 INJECTION INTRAVENOUS at 21:11

## 2024-01-27 RX ADMIN — SODIUM CHLORIDE, PRESERVATIVE FREE 10 ML: 5 INJECTION INTRAVENOUS at 14:28

## 2024-01-27 RX ADMIN — DEXAMETHASONE SODIUM PHOSPHATE 40 MG: 4 INJECTION, SOLUTION INTRAMUSCULAR; INTRAVENOUS at 02:53

## 2024-01-27 NOTE — H&P
MD Rafael     January 27, 2024         Please note that this dictation may have been completed with Dragon, the computer voice recognition software.  Quite often unanticipated grammatical, syntax, homophones, and other interpretive errors are inadvertently transcribed by the computer software.  Please disregard these errors.  Please excuse any errors that have escaped final proofreading.

## 2024-01-27 NOTE — PROGRESS NOTES
[facility]                                                                                          Hospitalist Progress Note  Sushma Madala, MD  Office Phone: [phone]        Date of Service:  2024  NAME:  Aixa Barkley  :  1990  MRN:  [id number]       Admission Summary:   Aixa Barkley is a 33 y.o. female with a pmhx GERD, and umbilical hernia repair who presents with abdominal pain, and ecchymosis, and is being admitted for severe thrombocytopenia with right hemorrhagic ovarian cyst..     In the ED,  VSS.  Labs showed plt 5,000, and UA with moderate blood (5-10 RBC). CT abdomen/pelvis showed 3.1cm right adnexal hypodensity c/w hemorrhagic cyst, and small amounts of free fluid in lower abdomen/pelvis c/w blood products     I have called ED and asked for stat heme and gyn consult       Interval history / Subjective:   Patient is seen and examined at bedside this AM. Son present and helped with translation. Abd pain still present but improved. Low platelets - will wait for Hematology eval  Discussed with nursing, hematology Dr. 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 LD, haptoglobin, INR wnl. Retic count pending   - c/w IV decadron x 4 days   - started on IVIG x 2 days   - will monitor      #Ruptured hemmorrhagic ovarian cyst  -CT abd: 3.1cm right adnexal hypodensity c/w hemorrhagic cyst, and small amounts of free fluid in lower abdomen/pelvis c/w blood products  -  gynecology consulted      Code status: Full  Prophylaxis: SCDs  Care Plan discussed with: pt /family, RN  Anticipated Disposition: TBD. Home in 1-2 days   Inpatient  Cardiac monitoring: Telemetry  Central Line:            Social Determinants of Health     Tobacco Use: Not on file   Alcohol Use: Not on

## 2024-01-27 NOTE — ED NOTES
Obeys commands  Sherice Coma Scale Score: 15  Active LDA's:   Peripheral IV 01/26/24 Left Antecubital (Active)   Site Assessment Clean, dry & intact 01/26/24 1808   Line Status Blood return noted;Specimen collected;Flushed 01/26/24 1808   Phlebitis Assessment No symptoms 01/26/24 1808   Infiltration Assessment 0 01/26/24 1808   Dressing Status New dressing applied;Clean, dry & intact 01/26/24 1808   Dressing Type Transparent 01/26/24 1808   Dressing Intervention New 01/26/24 1808     PO Status: Regular  Pertinent or High Risk Medications/Drips: no   If Yes, please provide details: n/a  Titratable drips: no   Pending Blood Product Administration: no   Mobility: no current mobility problem   ED Fall Risk: Presents to emergency department  because of falls (Syncope, seizure, or loss of consciousness): No, Age > 70: No, Altered Mental Status, Intoxication with alcohol or substance confusion (Disorientation, impaired judgment, poor safety awaremess, or inability to follow instructions): No, Impaired Mobility: Ambulates or transfers with assistive devices or assistance; Unable to ambulate or transer.: No, Nursing Judgement: No     You may also review the ED PT Care Timeline found under the Summary Nursing Index tab.    Recommendation    Pending orders: n/a  Consults ordered: IP CONSULT TO HEMATOLOGY  IP CONSULT TO OB GYN    Consulted provider:      Plan for next 24 hours: Pain management, Comfort care, Routine labs  Additional Comments: n/a     If any further questions, please call Sending LPN at [phone]  Electronically signed by: Electronically signed by Delma Hyde LPN on 1/27/2024 at 6:39 AM

## 2024-01-28 LAB
ANION GAP SERPL CALC-SCNC: 10 MMOL/L (ref 5–15)
BASOPHILS # BLD: 0 K/UL (ref 0–0.1)
BASOPHILS NFR BLD: 0 % (ref 0–1)
BUN SERPL-MCNC: 18 MG/DL (ref 6–20)
BUN/CREAT SERPL: 25 (ref 12–20)
CALCIUM SERPL-MCNC: 9.1 MG/DL (ref 8.5–10.1)
CHLORIDE SERPL-SCNC: 107 MMOL/L (ref 97–108)
CO2 SERPL-SCNC: 21 MMOL/L (ref 21–32)
CREAT SERPL-MCNC: 0.73 MG/DL (ref 0.55–1.02)
DIFFERENTIAL METHOD BLD: ABNORMAL
EOSINOPHIL # BLD: 0 K/UL (ref 0–0.4)
EOSINOPHIL NFR BLD: 0 % (ref 0–7)
ERYTHROCYTE [DISTWIDTH] IN BLOOD BY AUTOMATED COUNT: 13 % (ref 11.5–14.5)
GLUCOSE SERPL-MCNC: 293 MG/DL (ref 65–100)
HCT VFR BLD AUTO: 32 % (ref 35–47)
HGB BLD-MCNC: 10.7 G/DL (ref 11.5–16)
IMM GRANULOCYTES # BLD AUTO: 0.2 K/UL (ref 0–0.04)
IMM GRANULOCYTES NFR BLD AUTO: 1 % (ref 0–0.5)
LYMPHOCYTES # BLD: 1.8 K/UL (ref 0.8–3.5)
LYMPHOCYTES NFR BLD: 11 % (ref 12–49)
MCH RBC QN AUTO: 29.6 PG (ref 26–34)
MCHC RBC AUTO-ENTMCNC: 33.4 G/DL (ref 30–36.5)
MCV RBC AUTO: 88.4 FL (ref 80–99)
MONOCYTES # BLD: 0.3 K/UL (ref 0–1)
MONOCYTES NFR BLD: 2 % (ref 5–13)
NEUTS SEG # BLD: 14.4 K/UL (ref 1.8–8)
NEUTS SEG NFR BLD: 86 % (ref 32–75)
NRBC # BLD: 0 K/UL (ref 0–0.01)
NRBC BLD-RTO: 0 PER 100 WBC
PLATELET # BLD AUTO: 50 K/UL (ref 150–400)
POTASSIUM SERPL-SCNC: 4 MMOL/L (ref 3.5–5.1)
RBC # BLD AUTO: 3.62 M/UL (ref 3.8–5.2)
RBC MORPH BLD: ABNORMAL
RBC MORPH BLD: ABNORMAL
SODIUM SERPL-SCNC: 138 MMOL/L (ref 136–145)
WBC # BLD AUTO: 16.7 K/UL (ref 3.6–11)

## 2024-01-28 PROCEDURE — 36415 COLL VENOUS BLD VENIPUNCTURE: CPT

## 2024-01-28 PROCEDURE — 2580000003 HC RX 258: Performed by: STUDENT IN AN ORGANIZED HEALTH CARE EDUCATION/TRAINING PROGRAM

## 2024-01-28 PROCEDURE — 2060000000 HC ICU INTERMEDIATE R&B

## 2024-01-28 PROCEDURE — 80048 BASIC METABOLIC PNL TOTAL CA: CPT

## 2024-01-28 PROCEDURE — 2580000003 HC RX 258: Performed by: FAMILY MEDICINE

## 2024-01-28 PROCEDURE — 6370000000 HC RX 637 (ALT 250 FOR IP): Performed by: FAMILY MEDICINE

## 2024-01-28 PROCEDURE — 6360000002 HC RX W HCPCS: Performed by: INTERNAL MEDICINE

## 2024-01-28 PROCEDURE — 6360000002 HC RX W HCPCS: Performed by: STUDENT IN AN ORGANIZED HEALTH CARE EDUCATION/TRAINING PROGRAM

## 2024-01-28 PROCEDURE — 85025 COMPLETE CBC W/AUTO DIFF WBC: CPT

## 2024-01-28 RX ADMIN — POLYETHYLENE GLYCOL 3350 17 G: 17 POWDER, FOR SOLUTION ORAL at 09:30

## 2024-01-28 RX ADMIN — DEXAMETHASONE SODIUM PHOSPHATE 40 MG: 4 INJECTION, SOLUTION INTRAMUSCULAR; INTRAVENOUS at 02:34

## 2024-01-28 RX ADMIN — SODIUM CHLORIDE, PRESERVATIVE FREE 10 ML: 5 INJECTION INTRAVENOUS at 09:30

## 2024-01-28 RX ADMIN — SODIUM CHLORIDE, PRESERVATIVE FREE 5 ML: 5 INJECTION INTRAVENOUS at 22:06

## 2024-01-28 RX ADMIN — IMMUNE GLOBULIN (HUMAN) 45 G: 10 INJECTION INTRAVENOUS; SUBCUTANEOUS at 10:35

## 2024-01-28 ASSESSMENT — PAIN SCALES - GENERAL: PAINLEVEL_OUTOF10: 0

## 2024-01-28 NOTE — PROGRESS NOTES
[facility]                                                                                          Hospitalist Progress Note  Sushma Madala, MD  Office Phone: [phone]        Date of Service:  2024  NAME:  Aixa Barkley  :  1990  MRN:  [id number]       Admission Summary:   Aixa Barkley is a 33 y.o. female with a pmhx GERD, and umbilical hernia repair who presents with abdominal pain, and ecchymosis, and is being admitted for severe thrombocytopenia with right hemorrhagic ovarian cyst..     In the ED,  VSS.  Labs showed plt 5,000, and UA with moderate blood (5-10 RBC). CT abdomen/pelvis showed 3.1cm right adnexal hypodensity c/w hemorrhagic cyst, and small amounts of free fluid in lower abdomen/pelvis c/w blood products     I have called ED and asked for stat heme and gyn consult       Interval history / Subjective:   Patient is seen and examined at bedside this AM.  present and helped with translation. Abd pain improved. Platelets increased to 50. Will wait for hematology recs for discharge   Discussed with nursing     Assessment & Plan:      #Severe thrombocytopenia - improving   Likely due to ITP   -plt 5,000 on poa   -appreciate hematology input \"No evidence of microangiopathic hemolytic anemia, not TTP \"  - peripheral smear - pending. LD, haptoglobin, INR wnl. Retic count pending   - c/w IV decadron x 4 days   - s/p IVIG x 2 days   - will monitor      #Ruptured hemmorrhagic ovarian cyst  -CT abd: 3.1cm right adnexal hypodensity c/w hemorrhagic cyst, and small amounts of free fluid in lower abdomen/pelvis c/w blood products  -  appreciate gynecology input - conservative management       Code status: Full  Prophylaxis: SCDs  Care Plan discussed with: pt /family, RN  Anticipated Disposition: TBD. Home in 1-2 days   Inpatient  Cardiac monitoring: Telemetry  Central Line:            Social Determinants of Health     Tobacco Use:

## 2024-01-29 ENCOUNTER — TELEPHONE (OUTPATIENT)
Age: 34
End: 2024-01-29

## 2024-01-29 VITALS
HEART RATE: 78 BPM | BODY MASS INDEX: 40.96 KG/M2 | DIASTOLIC BLOOD PRESSURE: 82 MMHG | SYSTOLIC BLOOD PRESSURE: 125 MMHG | HEIGHT: 56 IN | WEIGHT: 182.1 LBS | TEMPERATURE: 98.2 F | OXYGEN SATURATION: 99 % | RESPIRATION RATE: 18 BRPM

## 2024-01-29 DIAGNOSIS — D69.6 THROMBOCYTOPENIA (HCC): Primary | ICD-10-CM

## 2024-01-29 LAB
ADAMTS13 COMMENT: NORMAL
ANION GAP SERPL CALC-SCNC: 4 MMOL/L (ref 5–15)
BASOPHILS # BLD: 0 K/UL (ref 0–0.1)
BASOPHILS NFR BLD: 0 % (ref 0–1)
BUN SERPL-MCNC: 16 MG/DL (ref 6–20)
BUN/CREAT SERPL: 23 (ref 12–20)
CALCIUM SERPL-MCNC: 8.2 MG/DL (ref 8.5–10.1)
CHLORIDE SERPL-SCNC: 105 MMOL/L (ref 97–108)
CO2 SERPL-SCNC: 21 MMOL/L (ref 21–32)
CREAT SERPL-MCNC: 0.7 MG/DL (ref 0.55–1.02)
DIFFERENTIAL METHOD BLD: ABNORMAL
EOSINOPHIL # BLD: 0 K/UL (ref 0–0.4)
EOSINOPHIL NFR BLD: 0 % (ref 0–7)
ERYTHROCYTE [DISTWIDTH] IN BLOOD BY AUTOMATED COUNT: 13.2 % (ref 11.5–14.5)
EST. AVERAGE GLUCOSE BLD GHB EST-MCNC: 134 MG/DL
GLUCOSE SERPL-MCNC: 325 MG/DL (ref 65–100)
HBA1C MFR BLD: 6.3 % (ref 4–5.6)
HCT VFR BLD AUTO: 33.1 % (ref 35–47)
HGB BLD-MCNC: 11.2 G/DL (ref 11.5–16)
IMM GRANULOCYTES # BLD AUTO: 0.3 K/UL (ref 0–0.04)
IMM GRANULOCYTES NFR BLD AUTO: 2 % (ref 0–0.5)
LYMPHOCYTES # BLD: 2 K/UL (ref 0.8–3.5)
LYMPHOCYTES NFR BLD: 12 % (ref 12–49)
MCH RBC QN AUTO: 29.5 PG (ref 26–34)
MCHC RBC AUTO-ENTMCNC: 33.8 G/DL (ref 30–36.5)
MCV RBC AUTO: 87.1 FL (ref 80–99)
MONOCYTES # BLD: 0.5 K/UL (ref 0–1)
MONOCYTES NFR BLD: 3 % (ref 5–13)
NEUTS SEG # BLD: 13.9 K/UL (ref 1.8–8)
NEUTS SEG NFR BLD: 83 % (ref 32–75)
NRBC # BLD: 0 K/UL (ref 0–0.01)
NRBC BLD-RTO: 0 PER 100 WBC
PLATELET # BLD AUTO: 141 K/UL (ref 150–400)
PMV BLD AUTO: 11.5 FL (ref 8.9–12.9)
POTASSIUM SERPL-SCNC: 4.2 MMOL/L (ref 3.5–5.1)
RBC # BLD AUTO: 3.8 M/UL (ref 3.8–5.2)
SODIUM SERPL-SCNC: 130 MMOL/L (ref 136–145)
VWF CP ACT/NOR PPP CHRO: >100 %
WBC # BLD AUTO: 16.8 K/UL (ref 3.6–11)

## 2024-01-29 PROCEDURE — 99231 SBSQ HOSP IP/OBS SF/LOW 25: CPT | Performed by: INTERNAL MEDICINE

## 2024-01-29 PROCEDURE — 83036 HEMOGLOBIN GLYCOSYLATED A1C: CPT

## 2024-01-29 PROCEDURE — 2580000003 HC RX 258: Performed by: FAMILY MEDICINE

## 2024-01-29 PROCEDURE — 85025 COMPLETE CBC W/AUTO DIFF WBC: CPT

## 2024-01-29 PROCEDURE — 6360000002 HC RX W HCPCS: Performed by: STUDENT IN AN ORGANIZED HEALTH CARE EDUCATION/TRAINING PROGRAM

## 2024-01-29 PROCEDURE — 2580000003 HC RX 258: Performed by: STUDENT IN AN ORGANIZED HEALTH CARE EDUCATION/TRAINING PROGRAM

## 2024-01-29 PROCEDURE — 80048 BASIC METABOLIC PNL TOTAL CA: CPT

## 2024-01-29 PROCEDURE — 6370000000 HC RX 637 (ALT 250 FOR IP): Performed by: FAMILY MEDICINE

## 2024-01-29 PROCEDURE — 36415 COLL VENOUS BLD VENIPUNCTURE: CPT

## 2024-01-29 RX ADMIN — DEXAMETHASONE SODIUM PHOSPHATE 40 MG: 4 INJECTION, SOLUTION INTRAMUSCULAR; INTRAVENOUS at 00:08

## 2024-01-29 RX ADMIN — SODIUM CHLORIDE, PRESERVATIVE FREE 10 ML: 5 INJECTION INTRAVENOUS at 09:50

## 2024-01-29 RX ADMIN — POLYETHYLENE GLYCOL 3350 17 G: 17 POWDER, FOR SOLUTION ORAL at 10:50

## 2024-01-29 NOTE — DISCHARGE INSTRUCTIONS
Discharge Instructions       PATIENT ID: Aixa Barkley  MRN: [id number]   YOB: 1990    DATE OF ADMISSION: [unfilled]    DATE OF DISCHARGE: 1/29/2024    PRIMARY CARE PROVIDER: @PCP@     ATTENDING PHYSICIAN: [unfilled]  DISCHARGING PROVIDER: Sushma Madala, MD    To contact this individual call [phone] and ask the  to page.   If unavailable ask to be transferred the Adult Hospitalist Department.    DISCHARGE DIAGNOSES ITP    CONSULTATIONS: [unfilled]    PROCEDURES/SURGERIES: * No surgery found *    PENDING TEST RESULTS:   At the time of discharge the following test results are still pending: none     FOLLOW UP APPOINTMENTS:   [unfilled]   PCP in 1-2 weeks  Hematology in 1 week     ADDITIONAL CARE RECOMMENDATIONS:   Blood worjk - cbc in 1 week     DIET: diabetic diet  Oral Nutritional Supplements:     ACTIVITY: activity as tolerated    Radiology      DISCHARGE MEDICATIONS:   See Medication Reconciliation Form    It is important that you take the medication exactly as they are prescribed.   Keep your medication in the bottles provided by the pharmacist and keep a list of the medication names, dosages, and times to be taken in your wallet.   Do not take other medications without consulting your doctor.       NOTIFY YOUR PHYSICIAN FOR ANY OF THE FOLLOWING:   Fever over 101 degrees for 24 hours.   Chest pain, shortness of breath, fever, chills, nausea, vomiting, diarrhea, change in mentation, falling, weakness, bleeding. Severe pain or pain not relieved by medications.  Or, any other signs or symptoms that you may have questions about.      DISPOSITION:  X  Home With:   OT  PT  HH  RN       SNF/Inpatient Rehab/LTAC    Independent/assisted living    Hospice    Other:     Signed:   Sushma Madala, MD  1/29/2024  1:31 PM         Aprenda acerca de la planificación de comidas para la diabetes  Learning About Meal Planning for Diabetes  ¿Por qué planificar las comidas?     La planificación

## 2024-01-29 NOTE — TELEPHONE ENCOUNTER
Called spoke with patient and spouse regarding a hospital follow up and also getting labs prior. Confirmed date and time.

## 2024-01-29 NOTE — CONSULTS
[facility]  [address], [address] [place] [address]  W: [phone]  F: [phone]      Reason for Visit:   Aixa Barkley is a 33 y.o. female who is seen for thrombocytopenia    Treatment History:   N/a    History of Present Illness:   Pt admitted on 1/26/24 presenting with abd pain, ecchymosis, and severe thrombocytopenia    Past Medical History:   Diagnosis Date    GERD (gastroesophageal reflux disease)     Other ill-defined conditions(799.89)     Heart beats fast then SOB      No past surgical history on file.   Social History     Tobacco Use    Smoking status: Never    Smokeless tobacco: Never   Substance Use Topics    Alcohol use: No      No family history on file.  Current Facility-Administered Medications   Medication Dose Route Frequency    dexAMETHasone (DECADRON) 40 mg in sodium chloride 0.9 % 50 mL IVPB  40 mg IntraVENous Q24H    sodium chloride flush 0.9 % injection 5-40 mL  5-40 mL IntraVENous 2 times per day    sodium chloride flush 0.9 % injection 5-40 mL  5-40 mL IntraVENous PRN    0.9 % sodium chloride infusion   IntraVENous PRN    potassium chloride (KLOR-CON) extended release tablet 40 mEq  40 mEq Oral PRN    Or    potassium bicarb-citric acid (EFFER-K) effervescent tablet 40 mEq  40 mEq Oral PRN    Or    potassium chloride 10 mEq/100 mL IVPB (Peripheral Line)  10 mEq IntraVENous PRN    magnesium sulfate 2000 mg in 50 mL IVPB premix  2,000 mg IntraVENous PRN    ondansetron (ZOFRAN-ODT) disintegrating tablet 4 mg  4 mg Oral Q8H PRN    Or    ondansetron (ZOFRAN) injection 4 mg  4 mg IntraVENous Q6H PRN    polyethylene glycol (GLYCOLAX) packet 17 g  17 g Oral Daily PRN    acetaminophen (TYLENOL) tablet 650 mg  650 mg Oral Q6H PRN    Or    acetaminophen (TYLENOL) suppository 650 mg  650 mg Rectal Q6H PRN    immune globulin (GAMUNEX-C) 10% solution 45 g  1 g/kg (Ideal) IntraVENous Daily      No Known Allergies     Review of Systems: A 
    [facility]  [address], [address] [place] [facility] [place] [facility] [place] [address]  W: [phone]  F: [phone]    Reason for Visit:   Aixa Barkley is a 33 y.o. female who is seen for ITP    Treatment History:   ITP: presented with platelets 6k 1/2024. Received dexamethasone (1/27 - 1/30/24) and IVIG x 2 doses.    Interval History:   Platelets improving. Patient denies any bleeding concerns.     Physical Exam:   /82   Pulse 78   Temp 98.2 °F (36.8 °C) (Oral)   Resp 18   Ht 1.422 m (4' 8\")   Wt 82.6 kg (182 lb 1.6 oz)   SpO2 99%   BMI 40.83 kg/m²   ECOG PS: 0    General: No distress, pleasant  Respiratory: normal respiratory effort, no wheezes  CV: No peripheral edema  Skin: No rashes, ecchymoses, or petechiae  Psych: Alert, oriented, appropriate affect, normal judgment/insight      Results:     Lab Results   Component Value Date/Time    WBC 16.8 01/29/2024 10:55 AM    HGB 11.2 01/29/2024 10:55 AM    HCT 33.1 01/29/2024 10:55 AM     01/29/2024 10:55 AM    MCV 87.1 01/29/2024 10:55 AM     Lab Results   Component Value Date/Time     01/29/2024 05:24 AM    K 4.2 01/29/2024 05:24 AM     01/29/2024 05:24 AM    CO2 21 01/29/2024 05:24 AM    BUN 16 01/29/2024 05:24 AM     Lab Results   Component Value Date/Time    ALT 30 01/26/2024 06:07 PM    GLOB 4.5 01/26/2024 06:07 PM     Lab Results   Component Value Date    RETICAUTO 3.0 (H) 01/27/2024    TZOIMAZL31 >2000 (H) 01/27/2024    FOLATE 40.4 (H) 01/27/2024     01/26/2024    HAPTOGLOBIN 178 01/27/2024     Lab Results   Component Value Date    INR 1.0 01/27/2024    PROTIME 10.8 01/27/2024    LIPASE 33 01/26/2024    IHK0PGJ 1.13 01/27/2024 1/26/24 CT a/p  IMPRESSION:  1. 3.1 cm right adnexal rounded hypodensity, likely arising from the right ovary  and likely represents a hemorrhagic cyst. Small amount of free fluid in the  lower abdomen and pelvis consistent with blood products.    Records reviewed and summarized 
1430: Received IP diabetes mgmt. Consult for new diagnosis /outpatient education.  Met briefly with patient prior to hospital discharge and provided brief diet education (reducing portions of starches/fruits/fruit juices) with Croatian literature to augment education.  Her son was at bedside to also assist with quick translation as she was leaving soon. I ensured she understood side effects of Metformin and ensure she understood pre-diabetes vs. Diabetes.  Provided A1C one pager explaining.  Noted she has follow up with cross over clinic on 2/21/24.     KODAK LOVE - CNS   Program for Diabetes Health  
Session ID: [id number]  Language: Maori   ID: #[id number]   Name: Bianca
Was called by the  on the floor to see this patient for TCP- plts 6k . Per the , no other group had been asked to see the patient and she requested I come see her this morning.  Dr Zamarripa, the hospitalist for the patient also called me kindly and we had a good in depth discussion of her case and review of her labs. She is on Dex and IVIG was recommended by me for likely ITP, and extra labs were ordered, I personally reviewed her blood smear as well - No schistocytes were seen. Occasional higinio drop cells and sl increase in atypical lymphocytes  Came by , saw pt, examined and discussed my a/p with patient and her son ( who served as the interpretor, as the patient is Albanian speaking)  at length.    Total time spent was 1 hour    I noted that Dr Rendon had just recently seen the patient and placed a note in the chart.    I have informed the unit secretary, RN and Dr Zamarripa .     I will sign off , unless I receive a call from the attending physician to see the patient to follow up tomorrow.    Thanks for the consult!      
% 0 0 - 7 %    Basophils % 1 0 - 1 %    Immature Granulocytes 0 %    Neutrophils Absolute 8.1 (H) 1.8 - 8.0 K/UL    Lymphocytes Absolute 1.8 0.8 - 3.5 K/UL    Monocytes Absolute 0.1 0.0 - 1.0 K/UL    Eosinophils Absolute 0.0 0.0 - 0.4 K/UL    Basophils Absolute 0.1 0.0 - 0.1 K/UL    Absolute Immature Granulocyte 0.0 K/UL    Differential Type MANUAL      RBC Comment NORMOCYTIC, NORMOCHROMIC     Vitamin B12 & Folate    Collection Time: 01/27/24  8:29 AM   Result Value Ref Range    Vitamin B-12 >2000 (H) 193 - 986 pg/mL    Folate 40.4 (H) 5.0 - 21.0 ng/mL   Protime-INR    Collection Time: 01/27/24  8:29 AM   Result Value Ref Range    INR 1.0 0.9 - 1.1      Protime 10.8 9.0 - 11.1 sec   Reticulocytes    Collection Time: 01/27/24  8:29 AM   Result Value Ref Range    Reticulocyte Count,Automated 3.0 (H) 0.7 - 2.1 %    Absolute Retic # 0.1216 (H) 0.0164 - 0.0776 M/ul   TSH    Collection Time: 01/27/24  8:29 AM   Result Value Ref Range    TSH, 3RD GENERATION 1.13 0.36 - 3.74 uIU/mL           Assessment:     Principal Problem:    Thrombocytopenia (HCC)  Resolved Problems:    * No resolved hospital problems. *      Plan:        32 yo with findings of likely ruptured right hemorrhagic cyst and newly diagnosed ITP with plt of 5-6k.  --As pt is not having active bleeding and in the face of severe thrombocytopenia would not electively perform surgery; her hemorrhagic cyst can be managed conservatively with PO/IV pain meds as needed.  --She will need GYN f/up with a pelvic US in 6-8 weeks; looks like pt last saw RAYMUNDO Franco in the [facility] system  --Would not expect that a cyst of this size would lead to any type of ovarian torsion, would expect complete resolution of cyst without intervention  --Intervention would only be warranted now if there were signs of active bleeding    Signed By: Claire Guillermo MD     January 27, 2024

## 2024-01-29 NOTE — PROGRESS NOTES
Hospital follow-up PCP transitional care appointment has been scheduled with Physician at [facility] location for Wednesday February 21st, 2024 at 1200N. Please bring completed application, provide photo ID, proof of income - recent eight weeks of paystubs, or statement of income from employer, listing of all medications and discharge papers. Pending patient discharge. Charmaine Reyna, Care Management Assistant

## 2024-01-29 NOTE — PROGRESS NOTES
Pt and son educated at bedside on discharge instructions and all questions answered. PIVs removed without complications. Pt discharged home with family. Pt declined wheelchair at discharge.

## 2024-01-29 NOTE — PROGRESS NOTES
Patient/caregivers speak Montserratian as their preferred language for their healthcare communication. For safe communication, use the [facility]  carts or call:    Senior /Navigator Gabby Elias at [phone] or   [facility] phone services for [facility] at [phone]    General phone: [phone] ( [phone])  Email: [email or web address]    Always document the use of interpreting services ('s ID number) in your clinical notes.    Our interpreters are available for team members working with limited  English proficient (LEP) patients remotely, via phone or video or in person (if needed for special cases).    When using family members to interpret, for the safety of the patient and protection of the communication of both our patient and [facility] staff the VRI or telephonic  should remain on the line to monitor that all communication is accurate and complete. The  should be instructed to notify [facility] staff immediately if there are any inaccuracies.         Thank you,        Gabby ROCA  Senior /Navigator

## 2024-01-29 NOTE — PROGRESS NOTES
DR STORY from [facility] oncology saw this patient for consultation yesterday as well as my partner.  We will Sign off on this case.  Please ask [facility] oncology to follow  Dwight Sanders MD

## 2024-01-29 NOTE — CARE COORDINATION
Care Management Initial Assessment       RUR: 4% Low  Readmission? No  1st IM letter given? NA  1st  letter given: NA    Patient presented to the ED on 1/27/24 with abdominal pain and bleeding and bruising. She has history of GERD.          01/29/24 2047   Service Assessment   Patient Orientation Alert and Oriented;Person;Place;Situation;Self   Cognition Alert   History Provided By Child/Family;Patient   Primary Caregiver Self   Accompanied By/Relationship Prosper Guthrie   Support Systems Children;Spouse/Significant Other   PCP Verified by CM   (CM provided [facility] Application)   Prior Functional Level Independent in ADLs/IADLs   Current Functional Level Independent in ADLs/IADLs   Can patient return to prior living arrangement Yes   Ability to make needs known: Good   Family able to assist with home care needs: Yes   Financial Resources None   Community Resources None   CM/SW Referral No PCP   Social/Functional History   Lives With Spouse;Family   Type of Home House   Bathroom Equipment None   Home Equipment None   Receives Help From Family   ADL Assistance Independent   Homemaking Assistance Independent   Ambulation Assistance Independent   Transfer Assistance Independent   Mode of Transportation Car   Occupation Unemployed   Discharge Planning   Type of Residence House   Living Arrangements Spouse/Significant Other;Children   Current Services Prior To Admission None   Potential Assistance Needed N/A   DME Ordered? No   Potential Assistance Purchasing Medications No  (Patient uses [facility] [address] DC meds filled at the [facility] inpatient pharmacy and have been delivered to the bedside.)   Type of Home Care Services None   Patient expects to be discharged to: House   Services At/After Discharge   Transition of Care Consult (CM Consult) N/A   Services At/After Discharge None    Resource Information Provided? No   Mode of Transport at Discharge Other (see comment)  (Prosper Guthrie)   Confirm

## 2024-01-29 NOTE — PROGRESS NOTES
Spiritual Care Assessment/Progress Note  [facility]    Name: Aixa Barkley MRN: [id number]    Age: 33 y.o.     Sex: female   Language: Cambodian     Date: 1/29/2024            Total Time Calculated: 15 min              Spiritual Assessment begun in [facility] MED SURG  Service Provided For:: Patient and family together  Referral/Consult From:: Rounding  Encounter Overview/Reason : Initial Encounter    Spiritual beliefs:      [] Involved in a contreras tradition/spiritual practice:      [] Supported by a contreras community:      [] Claims no spiritual orientation:      [] Seeking spiritual identity:           [x] Adheres to an individual form of spirituality:      [] Not able to assess:                Identified resources for coping and support system:   Support System: Spouse, Children       [] Prayer                  [] Devotional reading               [] Music                  [] Guided Imagery     [] Pet visits                                        [] Other: (COMMENT)     Specific area/focus of visit   Encounter:    Crisis:    Spiritual/Emotional needs: Type: Spiritual Support  Ritual, Rites and Sacraments:    Grief, Loss, and Adjustments:    Ethics/Mediation:    Behavioral Health:    Palliative Care:    Advance Care Planning:      Plan/Referrals: Developed Care Plan (see consult note)    Narrative:     This is a random initial visit to a patient in . The patient is Argentine speaking. Her 16 yr old son was inside the room who speaks very good english. The patient was happy that she could speak in Cambodian to the . She said she felt better and was hoping not to stay long in the hospital. The patient's primary concern is her not being able to work. At this point of time, the family did not give priority to their Yazdanism. The  provided active listening to their story of coming to this country. The  celebrated with the patient who believed that their quality of life has improved here as

## 2024-02-08 ENCOUNTER — TELEPHONE (OUTPATIENT)
Age: 34
End: 2024-02-08

## 2024-02-14 ENCOUNTER — TELEPHONE (OUTPATIENT)
Age: 34
End: 2024-02-14

## 2024-02-14 NOTE — TELEPHONE ENCOUNTER
----- Message from KODAK Fuentes CNP sent at 2/14/2024  8:20 AM EST -----  Regarding: RE: no show  Can you please reach out to patient to see if she is willing to reschedule labs in [facility] [place] with same day office visit? She missed her apt on 2/8 and Ty called and LVM. Please emphasize the importance of coming to appointments. She has ITP and was recently hospitalized with platelet count of 6k. Improved to 141k on discharge but we want to make sure that her platelets are staying stable.   ----- Message -----  From: Prerna Mcfarland APRN - CNP  Sent: 2/14/2024  12:00 AM EST  To: Background Patient List Reminders  Subject: no show

## 2024-02-14 NOTE — TELEPHONE ENCOUNTER
Verified patient ID x 2    Phone went straight to VM. Left VM in patients preferred language (Polish) advising that Dr. Pop would like to check and make sure her platelets are within normal range and have an office visit to follow up. Requested that patient return call to clinic.

## 2024-02-21 ENCOUNTER — TELEPHONE (OUTPATIENT)
Age: 34
End: 2024-02-21

## 2024-02-21 NOTE — TELEPHONE ENCOUNTER
Left VM in patient's preferred language to see if she is willing to come and do a follow up visit with Dr. Pop. Requested call back.

## 2024-02-29 ENCOUNTER — TELEPHONE (OUTPATIENT)
Age: 34
End: 2024-02-29

## 2024-02-29 NOTE — TELEPHONE ENCOUNTER
Abrahan left with assistance of  Alejo ID#[id number] to request a return call to office to r/s [facility] f/u appt.

## 2024-04-11 ENCOUNTER — TELEPHONE (OUTPATIENT)
Age: 34
End: 2024-04-11

## 2024-04-11 NOTE — TELEPHONE ENCOUNTER
Returned call to pt and spoke with spouse via ; advised of appts scheduled 4/29/2024 at 1:30pm in \[facility] [place] [facility]\""C and 1:40 with provider. Spouse inquired if pt could have earlier appt due to heavy bleeding during menses.

## 2024-04-11 NOTE — TELEPHONE ENCOUNTER
Pt's spouse called regarding pt. With assistance of , pt's spouse stated she's still having the same issue and would like an appt. Advised would reach out to team to see what's available and would call back. Confirmed both pt and spouses number in the chart.

## 2024-04-12 ENCOUNTER — APPOINTMENT (OUTPATIENT)
Facility: HOSPITAL | Age: 34
DRG: 812 | End: 2024-04-12

## 2024-04-12 ENCOUNTER — HOSPITAL ENCOUNTER (INPATIENT)
Facility: HOSPITAL | Age: 34
LOS: 2 days | Discharge: HOME | DRG: 812 | End: 2024-04-14
Attending: EMERGENCY MEDICINE | Admitting: FAMILY MEDICINE

## 2024-04-12 DIAGNOSIS — D62 ANEMIA DUE TO ACUTE BLOOD LOSS: ICD-10-CM

## 2024-04-12 DIAGNOSIS — N92.0 MENORRHAGIA WITH REGULAR CYCLE: Primary | ICD-10-CM

## 2024-04-12 DIAGNOSIS — D69.6 THROMBOCYTOPENIA (HCC): ICD-10-CM

## 2024-04-12 LAB
ALBUMIN SERPL-MCNC: 3.2 G/DL (ref 3.5–5)
ALBUMIN/GLOB SERPL: 0.8 (ref 1.1–2.2)
ALP SERPL-CCNC: 90 U/L (ref 45–117)
ALT SERPL-CCNC: 23 U/L (ref 12–78)
ANION GAP SERPL CALC-SCNC: 5 MMOL/L (ref 5–15)
APPEARANCE UR: CLEAR
APTT PPP: 22.2 SEC (ref 22.1–31)
AST SERPL-CCNC: 13 U/L (ref 15–37)
BACTERIA URNS QL MICRO: NEGATIVE /HPF
BILIRUB SERPL-MCNC: 0.2 MG/DL (ref 0.2–1)
BILIRUB UR QL: NEGATIVE
BUN SERPL-MCNC: 12 MG/DL (ref 6–20)
BUN/CREAT SERPL: 16 (ref 12–20)
CALCIUM SERPL-MCNC: 8.2 MG/DL (ref 8.5–10.1)
CHLORIDE SERPL-SCNC: 107 MMOL/L (ref 97–108)
CO2 SERPL-SCNC: 24 MMOL/L (ref 21–32)
COLOR UR: ABNORMAL
COMMENT:: NORMAL
CREAT SERPL-MCNC: 0.76 MG/DL (ref 0.55–1.02)
EKG ATRIAL RATE: 101 BPM
EKG DIAGNOSIS: NORMAL
EKG P AXIS: 73 DEGREES
EKG P-R INTERVAL: 150 MS
EKG Q-T INTERVAL: 360 MS
EKG QRS DURATION: 88 MS
EKG QTC CALCULATION (BAZETT): 466 MS
EKG R AXIS: 81 DEGREES
EKG T AXIS: 18 DEGREES
EKG VENTRICULAR RATE: 101 BPM
EPITH CASTS URNS QL MICRO: ABNORMAL /LPF
FIBRINOGEN PPP-MCNC: 226 MG/DL (ref 200–475)
FOLATE SERPL-MCNC: 24.2 NG/ML (ref 5–21)
GLOBULIN SER CALC-MCNC: 3.8 G/DL (ref 2–4)
GLUCOSE BLD STRIP.AUTO-MCNC: 183 MG/DL (ref 65–117)
GLUCOSE SERPL-MCNC: 157 MG/DL (ref 65–100)
GLUCOSE UR STRIP.AUTO-MCNC: NEGATIVE MG/DL
HCG SERPL QL: NEGATIVE
HCG UR QL: NEGATIVE
HGB UR QL STRIP: NEGATIVE
HISTORY CHECK: NORMAL
HISTORY CHECK: NORMAL
INR PPP: 1.1 (ref 0.9–1.1)
KETONES UR QL STRIP.AUTO: NEGATIVE MG/DL
LEUKOCYTE ESTERASE UR QL STRIP.AUTO: ABNORMAL
NITRITE UR QL STRIP.AUTO: NEGATIVE
PH UR STRIP: 7 (ref 5–8)
POTASSIUM SERPL-SCNC: 3.8 MMOL/L (ref 3.5–5.1)
PROT SERPL-MCNC: 7 G/DL (ref 6.4–8.2)
PROT UR STRIP-MCNC: NEGATIVE MG/DL
PROTHROMBIN TIME: 11 SEC (ref 9–11.1)
RBC #/AREA URNS HPF: ABNORMAL /HPF (ref 0–5)
SERVICE CMNT-IMP: ABNORMAL
SODIUM SERPL-SCNC: 136 MMOL/L (ref 136–145)
SP GR UR REFRACTOMETRY: 1.02 (ref 1–1.03)
SPECIMEN HOLD: NORMAL
THERAPEUTIC RANGE: NORMAL SECS (ref 58–77)
TROPONIN I SERPL HS-MCNC: <4 NG/L (ref 0–51)
URINE CULTURE IF INDICATED: ABNORMAL
UROBILINOGEN UR QL STRIP.AUTO: 0.2 EU/DL (ref 0.2–1)
VIT B12 SERPL-MCNC: >2000 PG/ML (ref 193–986)
WBC URNS QL MICRO: ABNORMAL /HPF (ref 0–4)

## 2024-04-12 PROCEDURE — 93010 ELECTROCARDIOGRAM REPORT: CPT | Performed by: INTERNAL MEDICINE

## 2024-04-12 PROCEDURE — 76830 TRANSVAGINAL US NON-OB: CPT

## 2024-04-12 PROCEDURE — 96374 THER/PROPH/DIAG INJ IV PUSH: CPT

## 2024-04-12 PROCEDURE — 30233N1 TRANSFUSION OF NONAUTOLOGOUS RED BLOOD CELLS INTO PERIPHERAL VEIN, PERCUTANEOUS APPROACH: ICD-10-PCS | Performed by: INTERNAL MEDICINE

## 2024-04-12 PROCEDURE — 87040 BLOOD CULTURE FOR BACTERIA: CPT

## 2024-04-12 PROCEDURE — 2580000003 HC RX 258: Performed by: FAMILY MEDICINE

## 2024-04-12 PROCEDURE — 93005 ELECTROCARDIOGRAM TRACING: CPT | Performed by: NURSE PRACTITIONER

## 2024-04-12 PROCEDURE — 36415 COLL VENOUS BLD VENIPUNCTURE: CPT

## 2024-04-12 PROCEDURE — 82607 VITAMIN B-12: CPT

## 2024-04-12 PROCEDURE — 85610 PROTHROMBIN TIME: CPT

## 2024-04-12 PROCEDURE — 85025 COMPLETE CBC W/AUTO DIFF WBC: CPT

## 2024-04-12 PROCEDURE — 81025 URINE PREGNANCY TEST: CPT

## 2024-04-12 PROCEDURE — 85384 FIBRINOGEN ACTIVITY: CPT

## 2024-04-12 PROCEDURE — 85730 THROMBOPLASTIN TIME PARTIAL: CPT

## 2024-04-12 PROCEDURE — 99285 EMERGENCY DEPT VISIT HI MDM: CPT

## 2024-04-12 PROCEDURE — 74177 CT ABD & PELVIS W/CONTRAST: CPT

## 2024-04-12 PROCEDURE — 85049 AUTOMATED PLATELET COUNT: CPT

## 2024-04-12 PROCEDURE — 6360000002 HC RX W HCPCS: Performed by: EMERGENCY MEDICINE

## 2024-04-12 PROCEDURE — 82962 GLUCOSE BLOOD TEST: CPT

## 2024-04-12 PROCEDURE — 86901 BLOOD TYPING SEROLOGIC RH(D): CPT

## 2024-04-12 PROCEDURE — 76856 US EXAM PELVIC COMPLETE: CPT

## 2024-04-12 PROCEDURE — 85362 FIBRIN DEGRADATION PRODUCTS: CPT

## 2024-04-12 PROCEDURE — 82746 ASSAY OF FOLIC ACID SERUM: CPT

## 2024-04-12 PROCEDURE — 80053 COMPREHEN METABOLIC PANEL: CPT

## 2024-04-12 PROCEDURE — 84703 CHORIONIC GONADOTROPIN ASSAY: CPT

## 2024-04-12 PROCEDURE — 6360000002 HC RX W HCPCS

## 2024-04-12 PROCEDURE — 6360000004 HC RX CONTRAST MEDICATION: Performed by: RADIOLOGY

## 2024-04-12 PROCEDURE — 86850 RBC ANTIBODY SCREEN: CPT

## 2024-04-12 PROCEDURE — 85379 FIBRIN DEGRADATION QUANT: CPT

## 2024-04-12 PROCEDURE — 36430 TRANSFUSION BLD/BLD COMPNT: CPT

## 2024-04-12 PROCEDURE — 86923 COMPATIBILITY TEST ELECTRIC: CPT

## 2024-04-12 PROCEDURE — 2060000000 HC ICU INTERMEDIATE R&B

## 2024-04-12 PROCEDURE — 2580000003 HC RX 258: Performed by: EMERGENCY MEDICINE

## 2024-04-12 PROCEDURE — 84484 ASSAY OF TROPONIN QUANT: CPT

## 2024-04-12 PROCEDURE — 30233R1 TRANSFUSION OF NONAUTOLOGOUS PLATELETS INTO PERIPHERAL VEIN, PERCUTANEOUS APPROACH: ICD-10-PCS | Performed by: INTERNAL MEDICINE

## 2024-04-12 PROCEDURE — 71046 X-RAY EXAM CHEST 2 VIEWS: CPT

## 2024-04-12 PROCEDURE — 87086 URINE CULTURE/COLONY COUNT: CPT

## 2024-04-12 PROCEDURE — 81001 URINALYSIS AUTO W/SCOPE: CPT

## 2024-04-12 PROCEDURE — 86900 BLOOD TYPING SEROLOGIC ABO: CPT

## 2024-04-12 PROCEDURE — P9016 RBC LEUKOCYTES REDUCED: HCPCS

## 2024-04-12 PROCEDURE — P9073 PLATELETS PHERESIS PATH REDU: HCPCS

## 2024-04-12 RX ORDER — SODIUM CHLORIDE 9 MG/ML
INJECTION, SOLUTION INTRAVENOUS PRN
Status: DISCONTINUED | OUTPATIENT
Start: 2024-04-12 | End: 2024-04-14 | Stop reason: HOSPADM

## 2024-04-12 RX ORDER — ACETAMINOPHEN 650 MG/1
650 SUPPOSITORY RECTAL EVERY 6 HOURS PRN
Status: DISCONTINUED | OUTPATIENT
Start: 2024-04-12 | End: 2024-04-14 | Stop reason: HOSPADM

## 2024-04-12 RX ORDER — SODIUM CHLORIDE 9 MG/ML
INJECTION, SOLUTION INTRAVENOUS PRN
Status: DISCONTINUED | OUTPATIENT
Start: 2024-04-12 | End: 2024-04-13

## 2024-04-12 RX ORDER — ONDANSETRON 2 MG/ML
4 INJECTION INTRAMUSCULAR; INTRAVENOUS EVERY 6 HOURS PRN
Status: DISCONTINUED | OUTPATIENT
Start: 2024-04-12 | End: 2024-04-14 | Stop reason: HOSPADM

## 2024-04-12 RX ORDER — INSULIN LISPRO 100 [IU]/ML
0-4 INJECTION, SOLUTION INTRAVENOUS; SUBCUTANEOUS NIGHTLY
Status: DISCONTINUED | OUTPATIENT
Start: 2024-04-12 | End: 2024-04-14 | Stop reason: HOSPADM

## 2024-04-12 RX ORDER — ONDANSETRON 4 MG/1
4 TABLET, ORALLY DISINTEGRATING ORAL EVERY 8 HOURS PRN
Status: DISCONTINUED | OUTPATIENT
Start: 2024-04-12 | End: 2024-04-14 | Stop reason: HOSPADM

## 2024-04-12 RX ORDER — DEXTROSE MONOHYDRATE 100 MG/ML
INJECTION, SOLUTION INTRAVENOUS CONTINUOUS PRN
Status: DISCONTINUED | OUTPATIENT
Start: 2024-04-12 | End: 2024-04-14 | Stop reason: HOSPADM

## 2024-04-12 RX ORDER — INSULIN LISPRO 100 [IU]/ML
0-4 INJECTION, SOLUTION INTRAVENOUS; SUBCUTANEOUS
Status: DISCONTINUED | OUTPATIENT
Start: 2024-04-13 | End: 2024-04-14

## 2024-04-12 RX ORDER — ACETAMINOPHEN 325 MG/1
650 TABLET ORAL EVERY 6 HOURS PRN
Status: DISCONTINUED | OUTPATIENT
Start: 2024-04-12 | End: 2024-04-14 | Stop reason: HOSPADM

## 2024-04-12 RX ORDER — 0.9 % SODIUM CHLORIDE 0.9 %
500 INTRAVENOUS SOLUTION INTRAVENOUS ONCE
Status: COMPLETED | OUTPATIENT
Start: 2024-04-12 | End: 2024-04-13

## 2024-04-12 RX ORDER — SODIUM CHLORIDE 0.9 % (FLUSH) 0.9 %
5-40 SYRINGE (ML) INJECTION PRN
Status: DISCONTINUED | OUTPATIENT
Start: 2024-04-12 | End: 2024-04-14 | Stop reason: HOSPADM

## 2024-04-12 RX ORDER — SODIUM CHLORIDE 0.9 % (FLUSH) 0.9 %
5-40 SYRINGE (ML) INJECTION EVERY 12 HOURS SCHEDULED
Status: DISCONTINUED | OUTPATIENT
Start: 2024-04-12 | End: 2024-04-14 | Stop reason: HOSPADM

## 2024-04-12 RX ORDER — POLYETHYLENE GLYCOL 3350 17 G/17G
17 POWDER, FOR SOLUTION ORAL DAILY PRN
Status: DISCONTINUED | OUTPATIENT
Start: 2024-04-12 | End: 2024-04-14 | Stop reason: HOSPADM

## 2024-04-12 RX ORDER — 0.9 % SODIUM CHLORIDE 0.9 %
1000 INTRAVENOUS SOLUTION INTRAVENOUS ONCE
Status: COMPLETED | OUTPATIENT
Start: 2024-04-12 | End: 2024-04-12

## 2024-04-12 RX ADMIN — SODIUM CHLORIDE 500 ML: 9 INJECTION, SOLUTION INTRAVENOUS at 22:30

## 2024-04-12 RX ADMIN — IOPAMIDOL 100 ML: 755 INJECTION, SOLUTION INTRAVENOUS at 16:57

## 2024-04-12 RX ADMIN — SODIUM CHLORIDE, PRESERVATIVE FREE 10 ML: 5 INJECTION INTRAVENOUS at 22:08

## 2024-04-12 RX ADMIN — SODIUM CHLORIDE 1000 ML: 9 INJECTION, SOLUTION INTRAVENOUS at 17:59

## 2024-04-12 RX ADMIN — IMMUNE GLOBULIN (HUMAN) 45 G: 10 INJECTION INTRAVENOUS; SUBCUTANEOUS at 20:51

## 2024-04-12 RX ADMIN — DEXAMETHASONE SODIUM PHOSPHATE 40 MG: 4 INJECTION, SOLUTION INTRAMUSCULAR; INTRAVENOUS at 18:31

## 2024-04-12 ASSESSMENT — PAIN DESCRIPTION - LOCATION: LOCATION: CHEST;ABDOMEN

## 2024-04-12 ASSESSMENT — PAIN DESCRIPTION - DESCRIPTORS: DESCRIPTORS: ACHING;TENDER

## 2024-04-12 ASSESSMENT — PAIN DESCRIPTION - FREQUENCY: FREQUENCY: CONTINUOUS

## 2024-04-12 ASSESSMENT — PAIN SCALES - GENERAL: PAINLEVEL_OUTOF10: 3

## 2024-04-12 ASSESSMENT — PAIN - FUNCTIONAL ASSESSMENT: PAIN_FUNCTIONAL_ASSESSMENT: ACTIVITIES ARE NOT PREVENTED

## 2024-04-12 ASSESSMENT — PAIN DESCRIPTION - ONSET: ONSET: PROGRESSIVE

## 2024-04-12 ASSESSMENT — PAIN DESCRIPTION - ORIENTATION: ORIENTATION: LEFT;LOWER

## 2024-04-12 ASSESSMENT — PAIN DESCRIPTION - PAIN TYPE: TYPE: ACUTE PAIN

## 2024-04-12 NOTE — CONSULTS
[facility] Brief Consult Note    Called to evaluate pt for anemia and thrombocytopenia    In short, pt is a 34yo Danish speaking F w/ PMH of ITP, GERD, DM and recent admission for R ruptured hemorrhagic cyst and ITP for which she received steroid course and IVIG who presented to ED for fatigue, mild dyspnea and LLQ abdominal tenderness.     No  used as evaluating MD Danish speaking. Pt states past 2 menstrual cycles have lasted about 3 days w/ heavy bleeding, however this cycle started 9 days prior with marked bleeding with frequent passage of numerous clots w/ saturation of pad every 15-60 minutes. Pt states decreased bleeding and clots in past 3 days, however continued fatigue symptoms. Pt also endorses easy bruising, however denies rash, LOC, CP. Denies continued steroid use or OCPs.    In ED VSS, labs w/ hgb 5.4, plt 4k. CT A/P w/ fluid filled cervix/upper vagina otherwise no other pertinent findings.     On evaluation, tired appearing but NAD. VSS. Obese female, pale mucosa, RRR, CTAB in no resp distress, extremities non-edematous    Symptomatic Blood Loss Anemia  Thrombocytopenia  ?ITP Recurrence      - Pt endorses she is no longer actively passing clot and blood, no evidence of end organ damage or signs of hemorrhagic shock  - Agree w/ heme and GYN consult  - Supportive transfusion for hgb >7, PLT >25  - Check coags  - Ensure volume resuscitation to euvolemia  - Ok for floor admission, please re-consult CCM if acute change in clinical status    Jim Rivera MD  Staff Intensivist  [facility]

## 2024-04-12 NOTE — H&P
History and Physical    Date of Service:  4/12/2024  Primary Care Provider: No primary care provider on file.  Source of information: Patient speaks Chadian.   Chadian language line translation service was offered but patient authorizes patient signed who is at the bedside to evaluate translation.  Patient and son provides some history.  Remainder of history was obtained per review of ED and electronic medical records.     Chief Complaint: Vaginal Bleeding      History of Presenting Illness:   Aixa Barkley is a 33 y.o. female with past medical history thrombocytopenia/ITP, acute blood loss anemia, right adnexal hemorrhagic cyst, type 2 diabetes mellitus, GERD, umbilical hernia presented to the emergency department chief complaint of vaginal bleeding, chest pain, abdominal pain, and fatigue.  Patient reportedly had vaginal bleeding starting about 9 days ago.  She is premenopausal.  She normally has menstrual cycles monthly with 3 days of bleeding with 2 prior normal menstrual cycles.  However current menstrual cycle started about 9 days ago and has persisted beyond the normal 3-day course with heavy vaginal bleeding with clots.  She reportedly is saturating pads about every hour.  She was last hospitalized on 1/26/2024 to 1/29/2024 with thrombocytopenia likely ITP (platelet 5 on arrival).  She was also diagnosed with a ruptured right adnexal hemorrhagic cyst for which OB/GYN was consulted but recommended conservative therapy.  Hematologist was consulted with diagnosis of ITP.  Patient was treated with IVIG x 2 days and dexamethasone IV x 4 days.  Last recorded platelet = 141, on discharge from hospital.  Patient was to follow-up with GYN as outpatient.  Today on arrival to the ED she had other complaints of left lower quadrant abdominal pain, chest pain, chest congestion (x 3 days), and fatigue.  Symptoms notably remain constant, severe, without specific alleviating factors.  On arrival in the ED,

## 2024-04-12 NOTE — ED PROVIDER NOTES
[facility] EMERGENCY DEP  EMERGENCY DEPARTMENT ENCOUNTER      Pt Name: Aixa Barkley  MRN: [id number]  Birthdate 1990  Date of evaluation: 2024  Provider: Saturnino Alicea MD    CHIEF COMPLAINT       Chief Complaint   Patient presents with    Vaginal Bleeding         HISTORY OF PRESENT ILLNESS    HPI  This is a 33-year-old female who is  3 para 3 abortus 0 who in January of this year was noted to have ITP.  She also had a ruptured ovarian cyst.  She was treated with IVIG and dexamethasone.  Since that time, she has had 3 episodes of heavy vaginal bleeding at her normal time of menses lasting about 9 days and bleeding at a rate of a pad per hour.  Over the last 9 days she has been doing that and developed generalized weakness and fatigue and was brought to the hospital with continued bleeding.  She voices no complaint of fever or chills, nausea or vomiting or other acute symptomatology.  She is having some mild pain in the left lower quadrant.    Review of External Medical Records:     Nursing Notes were reviewed.    REVIEW OF SYSTEMS       Review of Systems   Constitutional:  Negative for activity change, chills, fatigue and fever.   HENT:  Negative for congestion, ear pain, rhinorrhea, sinus pressure, sinus pain, sore throat and trouble swallowing.    Eyes: Negative.    Respiratory:  Negative for cough, chest tightness, shortness of breath, wheezing and stridor.    Cardiovascular:  Negative for chest pain, palpitations and leg swelling.   Gastrointestinal:  Negative for abdominal pain, blood in stool, diarrhea, nausea and vomiting.   Endocrine: Negative.    Genitourinary:  Positive for vaginal bleeding (severe - see HPI). Negative for decreased urine volume, difficulty urinating, flank pain, frequency and hematuria.   Musculoskeletal:  Negative for back pain, joint swelling and neck pain.   Skin:  Negative for color change, pallor and rash.   Neurological:  Positive for weakness. Negative

## 2024-04-12 NOTE — ED TRIAGE NOTES
Pt arrived ambulatory to the ER with CC vaginal bleeding x9 days and chest congestion x3 days. ~1 pad every hour with clots. +fatigue, LLQ abd tenderness on palpation, +chest pain    PMH: Thrombocytopenia since January    Denies melena

## 2024-04-13 LAB
ABO + RH BLD: NORMAL
ALBUMIN SERPL-MCNC: 3.1 G/DL (ref 3.5–5)
ALBUMIN/GLOB SERPL: 0.6 (ref 1.1–2.2)
ALP SERPL-CCNC: 83 U/L (ref 45–117)
ALT SERPL-CCNC: 27 U/L (ref 12–78)
ANION GAP SERPL CALC-SCNC: 7 MMOL/L (ref 5–15)
APPEARANCE UR: CLEAR
AST SERPL-CCNC: 20 U/L (ref 15–37)
BACTERIA SPEC CULT: NORMAL
BACTERIA URNS QL MICRO: NEGATIVE /HPF
BASOPHILS # BLD: 0 K/UL (ref 0–0.1)
BASOPHILS # BLD: 0 K/UL (ref 0–0.1)
BASOPHILS NFR BLD: 0 % (ref 0–1)
BASOPHILS NFR BLD: 0 % (ref 0–1)
BILIRUB SERPL-MCNC: 0.6 MG/DL (ref 0.2–1)
BILIRUB UR QL: NEGATIVE
BLD PROD TYP BPU: NORMAL
BLOOD BANK BLOOD PRODUCT EXPIRATION DATE: NORMAL
BLOOD BANK DISPENSE STATUS: NORMAL
BLOOD BANK ISBT PRODUCT BLOOD TYPE: 1700
BLOOD BANK ISBT PRODUCT BLOOD TYPE: 5100
BLOOD BANK ISBT PRODUCT BLOOD TYPE: 7300
BLOOD BANK ISBT PRODUCT BLOOD TYPE: 9500
BLOOD BANK PRODUCT CODE: NORMAL
BLOOD BANK UNIT TYPE AND RH: NORMAL
BLOOD GROUP ANTIBODIES SERPL: NORMAL
BPU ID: NORMAL
BUN SERPL-MCNC: 8 MG/DL (ref 6–20)
BUN/CREAT SERPL: 12 (ref 12–20)
CALCIUM SERPL-MCNC: 8 MG/DL (ref 8.5–10.1)
CHLORIDE SERPL-SCNC: 110 MMOL/L (ref 97–108)
CO2 SERPL-SCNC: 22 MMOL/L (ref 21–32)
COLOR UR: ABNORMAL
CREAT SERPL-MCNC: 0.65 MG/DL (ref 0.55–1.02)
CROSSMATCH RESULT: NORMAL
CROSSMATCH RESULT: NORMAL
DIFFERENTIAL METHOD BLD: ABNORMAL
DIFFERENTIAL METHOD BLD: ABNORMAL
EOSINOPHIL # BLD: 0 K/UL (ref 0–0.4)
EOSINOPHIL # BLD: 0.3 K/UL (ref 0–0.4)
EOSINOPHIL NFR BLD: 0 % (ref 0–7)
EOSINOPHIL NFR BLD: 3 % (ref 0–7)
EPITH CASTS URNS QL MICRO: ABNORMAL /LPF
ERYTHROCYTE [DISTWIDTH] IN BLOOD BY AUTOMATED COUNT: 13.8 % (ref 11.5–14.5)
ERYTHROCYTE [DISTWIDTH] IN BLOOD BY AUTOMATED COUNT: 15.9 % (ref 11.5–14.5)
EST. AVERAGE GLUCOSE BLD GHB EST-MCNC: 117 MG/DL
GLOBULIN SER CALC-MCNC: 4.9 G/DL (ref 2–4)
GLUCOSE BLD STRIP.AUTO-MCNC: 176 MG/DL (ref 65–117)
GLUCOSE BLD STRIP.AUTO-MCNC: 196 MG/DL (ref 65–117)
GLUCOSE BLD STRIP.AUTO-MCNC: 196 MG/DL (ref 65–117)
GLUCOSE BLD STRIP.AUTO-MCNC: 227 MG/DL (ref 65–117)
GLUCOSE SERPL-MCNC: 226 MG/DL (ref 65–100)
GLUCOSE UR STRIP.AUTO-MCNC: 500 MG/DL
HBA1C MFR BLD: 5.7 % (ref 4–5.6)
HCT VFR BLD AUTO: 17.7 % (ref 35–47)
HCT VFR BLD AUTO: 23.5 % (ref 35–47)
HEMOCCULT STL QL: NEGATIVE
HGB BLD-MCNC: 5.4 G/DL (ref 11.5–16)
HGB BLD-MCNC: 7.8 G/DL (ref 11.5–16)
HGB UR QL STRIP: NEGATIVE
HYALINE CASTS URNS QL MICRO: ABNORMAL /LPF (ref 0–5)
IMM GRANULOCYTES # BLD AUTO: 0 K/UL
IMM GRANULOCYTES # BLD AUTO: 0 K/UL
IMM GRANULOCYTES NFR BLD AUTO: 0 %
IMM GRANULOCYTES NFR BLD AUTO: 0 %
IRON SATN MFR SERPL: 19 % (ref 20–50)
IRON SERPL-MCNC: 73 UG/DL (ref 35–150)
KETONES UR QL STRIP.AUTO: NEGATIVE MG/DL
LACTATE SERPL-SCNC: 1.7 MMOL/L (ref 0.4–2)
LEUKOCYTE ESTERASE UR QL STRIP.AUTO: NEGATIVE
LYMPHOCYTES # BLD: 1.1 K/UL (ref 0.8–3.5)
LYMPHOCYTES # BLD: 2 K/UL (ref 0.8–3.5)
LYMPHOCYTES NFR BLD: 23 % (ref 12–49)
LYMPHOCYTES NFR BLD: 8 % (ref 12–49)
MCH RBC QN AUTO: 25.7 PG (ref 26–34)
MCH RBC QN AUTO: 26.4 PG (ref 26–34)
MCHC RBC AUTO-ENTMCNC: 30.5 G/DL (ref 30–36.5)
MCHC RBC AUTO-ENTMCNC: 33.2 G/DL (ref 30–36.5)
MCV RBC AUTO: 79.7 FL (ref 80–99)
MCV RBC AUTO: 84.3 FL (ref 80–99)
METAMYELOCYTES NFR BLD MANUAL: 2 %
MONOCYTES # BLD: 0.1 K/UL (ref 0–1)
MONOCYTES # BLD: 0.3 K/UL (ref 0–1)
MONOCYTES NFR BLD: 1 % (ref 5–13)
MONOCYTES NFR BLD: 3 % (ref 5–13)
MYELOCYTES NFR BLD MANUAL: 2 %
MYELOCYTES NFR BLD MANUAL: 6 %
NEUTS SEG # BLD: 12.2 K/UL (ref 1.8–8)
NEUTS SEG # BLD: 6 K/UL (ref 1.8–8)
NEUTS SEG NFR BLD: 67 % (ref 32–75)
NEUTS SEG NFR BLD: 85 % (ref 32–75)
NITRITE UR QL STRIP.AUTO: NEGATIVE
NRBC # BLD: 0.06 K/UL (ref 0–0.01)
NRBC # BLD: 0.07 K/UL (ref 0–0.01)
NRBC BLD-RTO: 0.4 PER 100 WBC
NRBC BLD-RTO: 0.8 PER 100 WBC
PATH REV BLD -IMP: ABNORMAL
PH UR STRIP: 7.5 (ref 5–8)
PHOSPHATE SERPL-MCNC: 2.6 MG/DL (ref 2.6–4.7)
PLATELET # BLD AUTO: 31 K/UL (ref 150–400)
PLATELET # BLD AUTO: 4 K/UL (ref 150–400)
PLATELET COMMENT: ABNORMAL
PMV BLD AUTO: 11.2 FL (ref 8.9–12.9)
POTASSIUM SERPL-SCNC: 3.7 MMOL/L (ref 3.5–5.1)
PROT SERPL-MCNC: 8 G/DL (ref 6.4–8.2)
PROT UR STRIP-MCNC: NEGATIVE MG/DL
RBC # BLD AUTO: 2.1 M/UL (ref 3.8–5.2)
RBC # BLD AUTO: 2.95 M/UL (ref 3.8–5.2)
RBC #/AREA URNS HPF: ABNORMAL /HPF (ref 0–5)
RBC MORPH BLD: ABNORMAL
SERVICE CMNT-IMP: ABNORMAL
SERVICE CMNT-IMP: NORMAL
SODIUM SERPL-SCNC: 139 MMOL/L (ref 136–145)
SP GR UR REFRACTOMETRY: 1.01 (ref 1–1.03)
SPECIMEN EXP DATE BLD: NORMAL
TIBC SERPL-MCNC: 379 UG/DL (ref 250–450)
UNIT DIVISION: 0
UNIT ISSUE DATE/TIME: NORMAL
URINE CULTURE IF INDICATED: ABNORMAL
UROBILINOGEN UR QL STRIP.AUTO: 0.2 EU/DL (ref 0.2–1)
WBC # BLD AUTO: 14.3 K/UL (ref 3.6–11)
WBC # BLD AUTO: 8.9 K/UL (ref 3.6–11)
WBC URNS QL MICRO: ABNORMAL /HPF (ref 0–4)

## 2024-04-13 PROCEDURE — 85025 COMPLETE CBC W/AUTO DIFF WBC: CPT

## 2024-04-13 PROCEDURE — 36430 TRANSFUSION BLD/BLD COMPNT: CPT

## 2024-04-13 PROCEDURE — 6360000002 HC RX W HCPCS: Performed by: INTERNAL MEDICINE

## 2024-04-13 PROCEDURE — 82962 GLUCOSE BLOOD TEST: CPT

## 2024-04-13 PROCEDURE — 83605 ASSAY OF LACTIC ACID: CPT

## 2024-04-13 PROCEDURE — 99222 1ST HOSP IP/OBS MODERATE 55: CPT | Performed by: INTERNAL MEDICINE

## 2024-04-13 PROCEDURE — 84100 ASSAY OF PHOSPHORUS: CPT

## 2024-04-13 PROCEDURE — 83036 HEMOGLOBIN GLYCOSYLATED A1C: CPT

## 2024-04-13 PROCEDURE — 2060000000 HC ICU INTERMEDIATE R&B

## 2024-04-13 PROCEDURE — 80053 COMPREHEN METABOLIC PANEL: CPT

## 2024-04-13 PROCEDURE — 36415 COLL VENOUS BLD VENIPUNCTURE: CPT

## 2024-04-13 PROCEDURE — 2580000003 HC RX 258: Performed by: FAMILY MEDICINE

## 2024-04-13 PROCEDURE — 83550 IRON BINDING TEST: CPT

## 2024-04-13 PROCEDURE — 83540 ASSAY OF IRON: CPT

## 2024-04-13 PROCEDURE — 81001 URINALYSIS AUTO W/SCOPE: CPT

## 2024-04-13 PROCEDURE — 2580000003 HC RX 258: Performed by: INTERNAL MEDICINE

## 2024-04-13 PROCEDURE — 82272 OCCULT BLD FECES 1-3 TESTS: CPT

## 2024-04-13 RX ADMIN — IMMUNE GLOBULIN (HUMAN) 45 G: 10 INJECTION INTRAVENOUS; SUBCUTANEOUS at 14:40

## 2024-04-13 RX ADMIN — DEXAMETHASONE SODIUM PHOSPHATE 40 MG: 4 INJECTION, SOLUTION INTRAMUSCULAR; INTRAVENOUS at 17:34

## 2024-04-13 RX ADMIN — SODIUM CHLORIDE, PRESERVATIVE FREE 10 ML: 5 INJECTION INTRAVENOUS at 09:49

## 2024-04-13 NOTE — ED NOTES
TRANSFER - OUT REPORT:    Verbal report given to RICK Whyte on Aixa Barkley  being transferred to [facility] for routine progression of patient care       Report consisted of patient's Situation, Background, Assessment and   Recommendations(SBAR).     Information from the following report(s) Nurse Handoff Report, ED Encounter Summary, ED SBAR, Intake/Output, MAR, and Recent Results was reviewed with the receiving nurse.    [place] Fall Assessment:                           Lines:   Peripheral IV 04/12/24 Left Antecubital (Active)   Site Assessment Clean, dry & intact 04/12/24 1525       Peripheral IV 04/12/24 Right Antecubital (Active)   Site Assessment Clean, dry & intact 04/12/24 1832   Line Status Blood return noted 04/12/24 1832   Phlebitis Assessment No symptoms 04/12/24 1832   Infiltration Assessment 0 04/12/24 1832   Dressing Status New dressing applied 04/12/24 1832   Dressing Type Transparent 04/12/24 1832        Opportunity for questions and clarification was provided.      Patient transported with:  Registered Nurse

## 2024-04-13 NOTE — PLAN OF CARE
Problem: Discharge Planning  Goal: Discharge to home or other facility with appropriate resources  4/13/2024 1043 by Allison Marc RN  Outcome: Progressing  4/13/2024 0336 by Isabell Jesus RN  Outcome: Progressing  Flowsheets (Taken 4/12/2024 2130)  Discharge to home or other facility with appropriate resources: Identify barriers to discharge with patient and caregiver     Problem: ABCDS Injury Assessment  Goal: Absence of physical injury  4/13/2024 1043 by Allison Marc RN  Outcome: Progressing  4/13/2024 0336 by Isabell Jesus RN  Outcome: Progressing  Flowsheets (Taken 4/12/2024 2141)  Absence of Physical Injury: Implement safety measures based on patient assessment     Problem: Safety - Adult  Goal: Free from fall injury  Outcome: Progressing     Problem: Skin/Tissue Integrity  Goal: Absence of new skin breakdown  Description: 1.  Monitor for areas of redness and/or skin breakdown  2.  Assess vascular access sites hourly  3.  Every 4-6 hours minimum:  Change oxygen saturation probe site  4.  Every 4-6 hours:  If on nasal continuous positive airway pressure, respiratory therapy assess nares and determine need for appliance change or resting period.  Outcome: Progressing

## 2024-04-13 NOTE — PLAN OF CARE
Problem: Discharge Planning  Goal: Discharge to home or other facility with appropriate resources  Outcome: Progressing  Flowsheets (Taken 4/12/2024 2130)  Discharge to home or other facility with appropriate resources: Identify barriers to discharge with patient and caregiver     Problem: ABCDS Injury Assessment  Goal: Absence of physical injury  Outcome: Progressing  Flowsheets (Taken 4/12/2024 2141)  Absence of Physical Injury: Implement safety measures based on patient assessment

## 2024-04-14 VITALS
DIASTOLIC BLOOD PRESSURE: 51 MMHG | HEART RATE: 83 BPM | HEIGHT: 56 IN | WEIGHT: 178.79 LBS | TEMPERATURE: 98.1 F | OXYGEN SATURATION: 99 % | SYSTOLIC BLOOD PRESSURE: 116 MMHG | RESPIRATION RATE: 19 BRPM | BODY MASS INDEX: 40.22 KG/M2

## 2024-04-14 LAB
ERYTHROCYTE [DISTWIDTH] IN BLOOD BY AUTOMATED COUNT: 16.6 % (ref 11.5–14.5)
GLUCOSE BLD STRIP.AUTO-MCNC: 193 MG/DL (ref 65–117)
GLUCOSE BLD STRIP.AUTO-MCNC: 202 MG/DL (ref 65–117)
GLUCOSE BLD STRIP.AUTO-MCNC: 213 MG/DL (ref 65–117)
HCT VFR BLD AUTO: 23.2 % (ref 35–47)
HGB BLD-MCNC: 7.2 G/DL (ref 11.5–16)
MCH RBC QN AUTO: 25.8 PG (ref 26–34)
MCHC RBC AUTO-ENTMCNC: 31 G/DL (ref 30–36.5)
MCV RBC AUTO: 83.2 FL (ref 80–99)
NRBC # BLD: 0.06 K/UL (ref 0–0.01)
NRBC BLD-RTO: 0.4 PER 100 WBC
PLATELET # BLD AUTO: 112 K/UL (ref 150–400)
RBC # BLD AUTO: 2.79 M/UL (ref 3.8–5.2)
SERVICE CMNT-IMP: ABNORMAL
WBC # BLD AUTO: 16.6 K/UL (ref 3.6–11)

## 2024-04-14 PROCEDURE — 6370000000 HC RX 637 (ALT 250 FOR IP): Performed by: FAMILY MEDICINE

## 2024-04-14 PROCEDURE — 99231 SBSQ HOSP IP/OBS SF/LOW 25: CPT | Performed by: INTERNAL MEDICINE

## 2024-04-14 PROCEDURE — 85027 COMPLETE CBC AUTOMATED: CPT

## 2024-04-14 PROCEDURE — 36415 COLL VENOUS BLD VENIPUNCTURE: CPT

## 2024-04-14 PROCEDURE — 82962 GLUCOSE BLOOD TEST: CPT

## 2024-04-14 PROCEDURE — 6360000002 HC RX W HCPCS: Performed by: INTERNAL MEDICINE

## 2024-04-14 PROCEDURE — 2580000003 HC RX 258: Performed by: INTERNAL MEDICINE

## 2024-04-14 PROCEDURE — 2580000003 HC RX 258: Performed by: FAMILY MEDICINE

## 2024-04-14 RX ORDER — INSULIN LISPRO 100 [IU]/ML
0-8 INJECTION, SOLUTION INTRAVENOUS; SUBCUTANEOUS
Status: DISCONTINUED | OUTPATIENT
Start: 2024-04-14 | End: 2024-04-14 | Stop reason: HOSPADM

## 2024-04-14 RX ADMIN — SODIUM CHLORIDE 5 ML/HR: 9 INJECTION, SOLUTION INTRAVENOUS at 13:11

## 2024-04-14 RX ADMIN — ACETAMINOPHEN 650 MG: 325 TABLET ORAL at 11:43

## 2024-04-14 RX ADMIN — SODIUM CHLORIDE, PRESERVATIVE FREE 10 ML: 5 INJECTION INTRAVENOUS at 08:13

## 2024-04-14 RX ADMIN — INSULIN LISPRO 1 UNITS: 100 INJECTION, SOLUTION INTRAVENOUS; SUBCUTANEOUS at 08:13

## 2024-04-14 RX ADMIN — DEXAMETHASONE SODIUM PHOSPHATE 40 MG: 4 INJECTION, SOLUTION INTRAMUSCULAR; INTRAVENOUS at 13:12

## 2024-04-14 ASSESSMENT — PAIN DESCRIPTION - LOCATION: LOCATION: HEAD

## 2024-04-14 ASSESSMENT — PAIN DESCRIPTION - DESCRIPTORS: DESCRIPTORS: ACHING

## 2024-04-14 ASSESSMENT — PAIN - FUNCTIONAL ASSESSMENT: PAIN_FUNCTIONAL_ASSESSMENT: ACTIVITIES ARE NOT PREVENTED

## 2024-04-14 ASSESSMENT — PAIN SCALES - GENERAL: PAINLEVEL_OUTOF10: 6

## 2024-04-14 NOTE — PROGRESS NOTES
[facility] [place]  [address], [address] [facility] [address]  W: [phone]  F: [phone]    Reason for Visit:   Aixa Barkley is a 33 y.o. female who is seen for ITP    Treatment History:   ITP: presented with platelets 6k 1/2024.   Received dexamethasone (1/27 - 1/30/24) and IVIG x 2 doses.    Repeat Dex/ IVIG 4/12/24    HPI:     Admitted with menorrhagia and low platelets and iron def anemia.   Known ITP / pt of Dr Pop.   Getting Dex and IVIG  Platelets up to 112k this am.    Pt comfortable and feeling better.   No fevers/ chills/ chest pain/ SOB/ nausea/ vomiting/diarrhea/ pain/fatigue      Past Medical History:   Diagnosis Date    GERD (gastroesophageal reflux disease)     Other ill-defined conditions(799.89)     Heart beats fast then SOB     No past surgical history on file.    No Known Allergies  No current facility-administered medications on file prior to encounter.     Current Outpatient Medications on File Prior to Encounter   Medication Sig Dispense Refill    metFORMIN (GLUCOPHAGE) 500 MG tablet Take 1 tablet by mouth 2 times daily (with meals) 60 tablet 0     Social History     Socioeconomic History    Marital status:    Tobacco Use    Smoking status: Never    Smokeless tobacco: Never   Substance and Sexual Activity    Alcohol use: No    Drug use: No   Social History Narrative    ** Merged History Encounter **          Social Determinants of Health     Food Insecurity: No Food Insecurity (4/13/2024)    Hunger Vital Sign     Worried About Running Out of Food in the Last Year: Never true     Ran Out of Food in the Last Year: Never true   Transportation Needs: No Transportation Needs (4/13/2024)    PRAPARE - Transportation     Lack of Transportation (Medical): No     Lack of Transportation (Non-Medical): No   Housing Stability: Low Risk  (4/13/2024)    Housing Stability Vital Sign     Unable to Pay for Housing in the Last Year: No     Number of Places Lived in 
    [facility]                                                                               Hospitalist Progress Note  Ruth Shaver MD  Answering service: [phone] OR [phone] from in house phone        Date of Service:  2024  NAME:  Aixa Barkley  :  1990  MRN:  [id number]       Admission Summary:   Aixa Barkley is a 33 y.o. female with past medical history thrombocytopenia/ITP, acute blood loss anemia, right adnexal hemorrhagic cyst, type 2 diabetes mellitus, GERD, umbilical hernia presented to the emergency department chief complaint of vaginal bleeding, chest pain, abdominal pain, and fatigue.  Patient reportedly had vaginal bleeding starting about 9 days ago.  She is premenopausal.  She normally has menstrual cycles monthly with 3 days of bleeding with 2 prior normal menstrual cycles.  However current menstrual cycle started about 9 days ago and has persisted beyond the normal 3-day course with heavy vaginal bleeding with clots.  She reportedly is saturating pads about every hour.  She was last hospitalized on 2024 to 2024 with thrombocytopenia likely ITP (platelet 5 on arrival).  She was also diagnosed with a ruptured right adnexal hemorrhagic cyst for which OB/GYN was consulted but recommended conservative therapy.  Hematologist was consulted with diagnosis of ITP.  Patient was treated with IVIG x 2 days and dexamethasone IV x 4 days.  Last recorded platelet = 141, on discharge from hospital.  Patient was to follow-up with GYN as outpatient.  Today on arrival to the ED she had other complaints of left lower quadrant abdominal pain, chest pain, chest congestion (x 3 days), and fatigue.  Symptoms notably remain constant, severe, without specific alleviating factors.  On arrival in the ED, initial recorded vital signs were temperature 98.5 °F, /83, heart rate 99, respiratory rate 18, O2 saturation 100% on room air.  12 lead EKG 
    [facility]                                                                               Hospitalist Progress Note  Ruth Shaver MD  Answering service: [phone] OR [phone] from in house phone        Date of Service:  2024  NAME:  Aixa Barkley  :  1990  MRN:  [id number]       Admission Summary:   Aixa Barkley is a 33 y.o. female with past medical history thrombocytopenia/ITP, acute blood loss anemia, right adnexal hemorrhagic cyst, type 2 diabetes mellitus, GERD, umbilical hernia presented to the emergency department chief complaint of vaginal bleeding, chest pain, abdominal pain, and fatigue.  Patient reportedly had vaginal bleeding starting about 9 days ago.  She is premenopausal.  She normally has menstrual cycles monthly with 3 days of bleeding with 2 prior normal menstrual cycles.  However current menstrual cycle started about 9 days ago and has persisted beyond the normal 3-day course with heavy vaginal bleeding with clots.  She reportedly is saturating pads about every hour.  She was last hospitalized on 2024 to 2024 with thrombocytopenia likely ITP (platelet 5 on arrival).  She was also diagnosed with a ruptured right adnexal hemorrhagic cyst for which OB/GYN was consulted but recommended conservative therapy.  Hematologist was consulted with diagnosis of ITP.  Patient was treated with IVIG x 2 days and dexamethasone IV x 4 days.  Last recorded platelet = 141, on discharge from hospital.  Patient was to follow-up with GYN as outpatient.  Today on arrival to the ED she had other complaints of left lower quadrant abdominal pain, chest pain, chest congestion (x 3 days), and fatigue.  Symptoms notably remain constant, severe, without specific alleviating factors.  On arrival in the ED, initial recorded vital signs were temperature 98.5 °F, /83, heart rate 99, respiratory rate 18, O2 saturation 100% on room air.  12 lead EKG 
Patient is discharged home with family. VSS with no complaint of pain. Pt ate 70% of her meals, been drinking and voiding fine no BM today, last one was yesterday. Telemetry and IV access were removed and discontinued. All pt's belongings are sent  and returned to patient and family. Discharge paper and instructions are given and went over with the patient and family, all questions and concerns were addressed and pt and family verbalized understanding, specially her follow up appointment. Patient left the unit @1540 via wheelchair transported by our PCT Baldo.   
    Seen in hospital on call.   Comfortable in bed. Family at bedside translating per pt preference.   Presented with platelets 4k, now 31k   Reviewed labs and treatment.  - getting IVIG x 2 doses  - Continue Dexamethasone 40 mg x 4 doses   Follow CBC / d/c once trend up/ good today  - Outpatient follow up with Dr Pop.    # iron def anemia due to menorrhagia.  Iron replacement.     #) menorrhagia  - Management per gynecology    We will follow labs  Fu with Dr Pop as outpt  Call if questions    I appreciate the opportunity to participate in Ms. Aixa Barkley's care. Case discussed with hospitalist.    Signed By: Martina Hernández DO

## 2024-04-14 NOTE — PLAN OF CARE
Problem: Discharge Planning  Goal: Discharge to home or other facility with appropriate resources  4/14/2024 1218 by Allison Marc RN  Outcome: Progressing  4/14/2024 0043 by Domenico Moyer RN  Outcome: Progressing  Flowsheets (Taken 4/13/2024 2001)  Discharge to home or other facility with appropriate resources: Identify barriers to discharge with patient and caregiver     Problem: ABCDS Injury Assessment  Goal: Absence of physical injury  4/14/2024 1218 by Allison Marc RN  Outcome: Progressing  4/14/2024 0043 by Domenico Moyer, RN  Outcome: Progressing     Problem: Safety - Adult  Goal: Free from fall injury  4/14/2024 0043 by Domenico Moyer RN  Outcome: Progressing     Problem: Skin/Tissue Integrity  Goal: Absence of new skin breakdown  Description: 1.  Monitor for areas of redness and/or skin breakdown  2.  Assess vascular access sites hourly  3.  Every 4-6 hours minimum:  Change oxygen saturation probe site  4.  Every 4-6 hours:  If on nasal continuous positive airway pressure, respiratory therapy assess nares and determine need for appliance change or resting period.  4/14/2024 0043 by Domenico Moyer, RN  Outcome: Progressing

## 2024-04-14 NOTE — DISCHARGE SUMMARY
standardized protocol tailored for this examination and automatic exposure control for dose modulation. FINDINGS: LOWER THORAX: No significant abnormality in the incidentally imaged lower chest. LIVER: No mass. BILIARY TREE: Cholecystectomy CBD is not dilated. SPLEEN: within normal limits. PANCREAS: No mass or ductal dilatation. ADRENALS: Unremarkable. KIDNEYS: No mass, calculus, or hydronephrosis. STOMACH: Unremarkable. SMALL BOWEL: No dilatation or wall thickening. COLON: No dilatation or wall thickening. APPENDIX: Normal PERITONEUM: No ascites or pneumoperitoneum. The previously described hyperdense fluid in the pelvis and paracolic gutters is no longer visualized. RETROPERITONEUM: No lymphadenopathy or aortic aneurysm. REPRODUCTIVE ORGANS: The upper vaginal vault is distended with fluid and several gas bubbles, new since the prior study. This may, in part, also represent the cervix. The ovaries are now normal in appearance. URINARY BLADDER: Decompressed and not well assessed. BONES: No destructive bone lesion. ABDOMINAL WALL: No mass or hernia. ADDITIONAL COMMENTS: N/A     1. Fluid-filled mildly distended upper vagina or cervix, of uncertain significance. Correlate with direct pelvic examination to exclude neoplasm or inflammatory process. 2. Other incidental and postoperative changes.      Greater than 31 minutes were spent on discharge services.    Signed:  Ruth Shaver MD  4/14/2024  2:34 PM

## 2024-04-14 NOTE — PLAN OF CARE
Problem: Discharge Planning  Goal: Discharge to home or other facility with appropriate resources  4/14/2024 1523 by Allison Marc, RN  Outcome: Completed  4/14/2024 1218 by Allison Marc RN  Outcome: Progressing     Problem: ABCDS Injury Assessment  Goal: Absence of physical injury  4/14/2024 1523 by Allison Marc, RN  Outcome: Completed  4/14/2024 1218 by Allison Marc, RN  Outcome: Progressing

## 2024-04-15 LAB
APTT: 23 SEC (ref 24–33)
D DIMER: 0.33 MG/L FEU (ref 0–0.49)
FIBRINOGEN ACTIVITY: 220 MG/DL (ref 193–507)
FSP PPP LA-ACNC: <5 UG/ML
INR BLD: 1.1 (ref 0.9–1.2)
PLATELET # BLD AUTO: ABNORMAL X10E3/UL
PROTHROMBIN TIME: 11.6 SEC (ref 9.1–12)

## 2024-04-16 ASSESSMENT — ENCOUNTER SYMPTOMS
DIARRHEA: 0
TROUBLE SWALLOWING: 0
RHINORRHEA: 0
WHEEZING: 0
STRIDOR: 0
EYES NEGATIVE: 1
VOMITING: 0
SINUS PAIN: 0
COLOR CHANGE: 0
SINUS PRESSURE: 0
COUGH: 0
CHEST TIGHTNESS: 0
SHORTNESS OF BREATH: 0
SORE THROAT: 0
BACK PAIN: 0
ABDOMINAL PAIN: 0
NAUSEA: 0
BLOOD IN STOOL: 0

## 2024-04-17 LAB
BACTERIA SPEC CULT: NORMAL
BACTERIA SPEC CULT: NORMAL
SERVICE CMNT-IMP: NORMAL
SERVICE CMNT-IMP: NORMAL

## 2024-04-18 DIAGNOSIS — D50.0 IRON DEFICIENCY ANEMIA DUE TO CHRONIC BLOOD LOSS: ICD-10-CM

## 2024-04-18 DIAGNOSIS — D69.3 ACUTE IDIOPATHIC THROMBOCYTOPENIC PURPURA (HCC): Primary | ICD-10-CM

## 2024-04-18 DIAGNOSIS — D69.3 ACUTE IDIOPATHIC THROMBOCYTOPENIC PURPURA (HCC): ICD-10-CM

## 2024-04-19 ENCOUNTER — TELEPHONE (OUTPATIENT)
Age: 34
End: 2024-04-19

## 2024-04-19 LAB
BASOPHILS # BLD AUTO: 0.1 X10E3/UL (ref 0–0.2)
BASOPHILS NFR BLD AUTO: 0 %
EOSINOPHIL # BLD AUTO: 0.3 X10E3/UL (ref 0–0.4)
EOSINOPHIL NFR BLD AUTO: 3 %
ERYTHROCYTE [DISTWIDTH] IN BLOOD BY AUTOMATED COUNT: 15.8 % (ref 11.7–15.4)
HCT VFR BLD AUTO: 27.4 % (ref 34–46.6)
HGB BLD-MCNC: 8.5 G/DL (ref 11.1–15.9)
IMM GRANULOCYTES # BLD AUTO: 0.1 X10E3/UL (ref 0–0.1)
IMM GRANULOCYTES NFR BLD AUTO: 1 %
LYMPHOCYTES # BLD AUTO: 3.8 X10E3/UL (ref 0.7–3.1)
LYMPHOCYTES NFR BLD AUTO: 34 %
MCH RBC QN AUTO: 25.3 PG (ref 26.6–33)
MCHC RBC AUTO-ENTMCNC: 31 G/DL (ref 31.5–35.7)
MCV RBC AUTO: 82 FL (ref 79–97)
MONOCYTES # BLD AUTO: 0.6 X10E3/UL (ref 0.1–0.9)
MONOCYTES NFR BLD AUTO: 6 %
NEUTROPHILS # BLD AUTO: 6.4 X10E3/UL (ref 1.4–7)
NEUTROPHILS NFR BLD AUTO: 56 %
PLATELET # BLD AUTO: 129 X10E3/UL (ref 150–450)
RBC # BLD AUTO: 3.36 X10E6/UL (ref 3.77–5.28)
WBC # BLD AUTO: 11.3 X10E3/UL (ref 3.4–10.8)

## 2024-04-19 NOTE — TELEPHONE ENCOUNTER
Verified patient ID x 2    Patient's spouse returned call. Unfortunately lab still did no have supplies. Apologized for the inconvenience. Let them know that while they are in their visit on Monday, I will call the lab and make sure they have the supplies before they go back over.

## 2024-04-19 NOTE — TELEPHONE ENCOUNTER
TC with patient's spouse (Jose Eduardo). Pt and spouse would like clarification on recent lab orders placed by Dr. Pop. Spouse stated pt has completed CBC test. Pt and spouse would like to know where to go to complete H. Pylori Breath test. Please advise.    #[phone]

## 2024-04-19 NOTE — PROGRESS NOTES
[facility]  Medical Oncology   [phone]    Hematology / Oncology Follow-up    Reason for Visit:   Aixa Barkley is a 33 y.o. female who is seen for follow-up of ITP and anemia.    Hematology Treatment History:    1/2024: presented with platelets 6k. Received dexamethasone (1/27 - 1/30/24) and IVIG x 2 doses.   4/12/2024: platelets 4k. Treated with dexamethasone (4/12 - 4/15/24) and IVIG x 2 doses    Assessment/Plan:   #) Acute relapsed ITP:  Treatment history as above. Currently remains steroid response.   Last platelets 4/18/24 appropriate at 129.   - H pylori testing now  - Monitor CBC monthly  - Check hepatitis serologies in case of Rituximab  - Counseled on alarm symptoms  - As she remains steroid responsive, would re-treat with dexamethasone 40 mg PO x 4 days +/- IVIG. If short interval between episodes, then would add Rituximab 375 mg/m2 weekly x 4 weeks for more durable remission.    #) Iron deficiency anemia:  Due to menorrhagia  - Check ferritin, iron profile now  - Start oral iron with vitamin C.   - Repeat iron studies and CBC in ~1 month  - If fails to improve, then recommend IV iron. Risks/benefits discussed    #) Menorrhagia:  Worse during acute ITP relapse  - Referral to gynecology for further management    Follow up: in 1 month with repeat labs prior to visit    Thank you for involving me in the care of this patient.    Signed By: Sabina Pop MD    April 22, 2024        Interval History:   Chart reviewed, interval events since last visit noted.     History obtained via .  Had relapsed ITP on 4/12/24 and presented with platelets 4k. Also has severe anemia.  Today, she states she is feeling much better.  Since leaving the hospital, she denies gum bleeding, easy bruising, epistaxis.  Her last menstrual period began began 1-2 days prior to last hospitalization. She reports heavy cycles. She requires pad/tampon change every 15 minutes. Her last cycle

## 2024-04-19 NOTE — TELEPHONE ENCOUNTER
Verified patient ID x 2    Let patient's  know that I spoke with the  and they still perform the exam, they just ran out of materials. They said they will have the needed materials again this afternoon around 3 pm. He verbalized understanding. No further needs.

## 2024-04-22 ENCOUNTER — CLINICAL DOCUMENTATION (OUTPATIENT)
Age: 34
End: 2024-04-22

## 2024-04-22 ENCOUNTER — OFFICE VISIT (OUTPATIENT)
Age: 34
End: 2024-04-22

## 2024-04-22 VITALS
SYSTOLIC BLOOD PRESSURE: 103 MMHG | HEART RATE: 78 BPM | OXYGEN SATURATION: 99 % | RESPIRATION RATE: 18 BRPM | DIASTOLIC BLOOD PRESSURE: 70 MMHG | WEIGHT: 180.1 LBS | BODY MASS INDEX: 40.38 KG/M2

## 2024-04-22 DIAGNOSIS — D69.3 ACUTE IDIOPATHIC THROMBOCYTOPENIC PURPURA (HCC): ICD-10-CM

## 2024-04-22 DIAGNOSIS — D50.0 IRON DEFICIENCY ANEMIA DUE TO CHRONIC BLOOD LOSS: ICD-10-CM

## 2024-04-22 DIAGNOSIS — D69.3 ACUTE IDIOPATHIC THROMBOCYTOPENIC PURPURA (HCC): Primary | ICD-10-CM

## 2024-04-22 PROCEDURE — 99214 OFFICE O/P EST MOD 30 MIN: CPT | Performed by: INTERNAL MEDICINE

## 2024-04-22 RX ORDER — FERROUS SULFATE 325(65) MG
325 TABLET ORAL
Qty: 90 TABLET | Refills: 1 | Status: SHIPPED | OUTPATIENT
Start: 2024-04-22

## 2024-04-22 NOTE — PROGRESS NOTES
SW referral received. Met with patient and son Cleveland at office.  services provided by Ed, ID #[id number].    Patient is uninsured and screened as eligible for emergency Medicaid only, though awaiting final decision by DSS. SW reviewed [email or web address] Financial Assistance Program with her and explained required supporting documents/information. She will complete application and plans to drop them off at the office this Friday 4/26. Patient has no other concerns at this time. Provided her my direct contact information for further assistance as desired.    [employer] remains available for further assistance.      Signed by: Gavi Velazquez LMSW

## 2024-04-22 NOTE — PROGRESS NOTES
Aixa Barkley is a 33 y.o. female     Chief Complaint   Patient presents with    Follow-up     ITP and anemia.       1. Have you been to the ER, urgent care clinic since your last visit?  Hospitalized since your last visit?No    2. Have you seen or consulted any other health care providers outside of the [facility] since your last visit?  Include any pap smears or colon screening. No

## 2024-04-23 ENCOUNTER — TELEPHONE (OUTPATIENT)
Age: 34
End: 2024-04-23

## 2024-04-23 DIAGNOSIS — D69.3 ACUTE IDIOPATHIC THROMBOCYTOPENIC PURPURA (HCC): Primary | ICD-10-CM

## 2024-04-23 LAB
ERYTHROCYTE [DISTWIDTH] IN BLOOD BY AUTOMATED COUNT: 15.4 % (ref 11.7–15.4)
FERRITIN SERPL-MCNC: 30 NG/ML (ref 15–150)
HBV CORE AB SERPL QL IA: POSITIVE
HBV SURFACE AB SER QL: REACTIVE
HBV SURFACE AG SERPL QL IA: NEGATIVE
HCT VFR BLD AUTO: 28.6 % (ref 34–46.6)
HGB BLD-MCNC: 8.9 G/DL (ref 11.1–15.9)
IRON SATN MFR SERPL: 5 % (ref 15–55)
IRON SERPL-MCNC: 25 UG/DL (ref 27–159)
MCH RBC QN AUTO: 25.9 PG (ref 26.6–33)
MCHC RBC AUTO-ENTMCNC: 31.1 G/DL (ref 31.5–35.7)
MCV RBC AUTO: 83 FL (ref 79–97)
MORPHOLOGY BLD-IMP: NORMAL
PLATELET # BLD AUTO: 34 X10E3/UL (ref 150–450)
RBC # BLD AUTO: 3.44 X10E6/UL (ref 3.77–5.28)
TIBC SERPL-MCNC: 471 UG/DL (ref 250–450)
UIBC SERPL-MCNC: 446 UG/DL (ref 131–425)
WBC # BLD AUTO: 10.5 X10E3/UL (ref 3.4–10.8)

## 2024-04-23 NOTE — TELEPHONE ENCOUNTER
----- Message from Sabina Pop MD sent at 4/23/2024  2:37 PM EDT -----  Please call patient and let her know that her labs show the following:  - Platelets 34. We will need to repeat CBC early next week. If she experiences any bleeding (nosebleeds, menorrhagia, gum bleeding) or develops petechial rash, she should go to the ER.  - Ongoing anemia. She should continue the oral iron with vitamin C as discussed in clinic.  - Her hepatitis B testing also shows signs of prior hepatitis B infection that has cleared.     MIRTHA Frankel, would re-treat only if platelets <20 or severe bleeding. I would not proceed with Rituximab (since this would require HBV prophylaxis). If she requires steroids again, then we would plan on prednisone taper and possibly adding Eltromopag.

## 2024-04-23 NOTE — TELEPHONE ENCOUNTER
Session code: [id number]    Verified patient ID x 2    Let her know that her labs show the following:     - Platelets 34. We will need to repeat CBC early next week. If she experiences any bleeding (nosebleeds, menorrhagia, gum bleeding) or develops petechial rash, she should go to the ER.     - Ongoing anemia. She should continue the oral iron with vitamin C as discussed in clinic.     - Her hepatitis B testing also shows signs of prior hepatitis B infection that has cleared.     Patient would like to  lab slips from office.

## 2024-04-29 DIAGNOSIS — D69.3 ACUTE IDIOPATHIC THROMBOCYTOPENIC PURPURA (HCC): ICD-10-CM

## 2024-04-30 ENCOUNTER — CLINICAL DOCUMENTATION (OUTPATIENT)
Age: 34
End: 2024-04-30

## 2024-04-30 ENCOUNTER — TELEPHONE (OUTPATIENT)
Age: 34
End: 2024-04-30

## 2024-04-30 DIAGNOSIS — D69.3 ACUTE IDIOPATHIC THROMBOCYTOPENIC PURPURA (HCC): Primary | ICD-10-CM

## 2024-04-30 LAB
BASOPHILS # BLD: 0.1 K/UL (ref 0–0.1)
BASOPHILS # BLD: 0.1 K/UL (ref 0–0.1)
BASOPHILS NFR BLD: 1 % (ref 0–1)
BASOPHILS NFR BLD: 1 % (ref 0–1)
DIFFERENTIAL METHOD BLD: ABNORMAL
DIFFERENTIAL METHOD BLD: ABNORMAL
EOSINOPHIL # BLD: 0.3 K/UL (ref 0–0.4)
EOSINOPHIL # BLD: 0.3 K/UL (ref 0–0.4)
EOSINOPHIL NFR BLD: 3 % (ref 0–7)
EOSINOPHIL NFR BLD: 3 % (ref 0–7)
ERYTHROCYTE [DISTWIDTH] IN BLOOD BY AUTOMATED COUNT: 16.3 % (ref 11.5–14.5)
ERYTHROCYTE [DISTWIDTH] IN BLOOD BY AUTOMATED COUNT: 16.3 % (ref 11.5–14.5)
HCT VFR BLD AUTO: 28.6 % (ref 35–47)
HCT VFR BLD AUTO: 28.6 % (ref 35–47)
HGB BLD-MCNC: 8.7 G/DL (ref 11.5–16)
HGB BLD-MCNC: 8.7 G/DL (ref 11.5–16)
IMM GRANULOCYTES # BLD AUTO: 0.1 K/UL (ref 0–0.04)
IMM GRANULOCYTES # BLD AUTO: 0.1 K/UL (ref 0–0.04)
IMM GRANULOCYTES NFR BLD AUTO: 1 % (ref 0–0.5)
IMM GRANULOCYTES NFR BLD AUTO: 1 % (ref 0–0.5)
LYMPHOCYTES # BLD: 3 K/UL (ref 0.8–3.5)
LYMPHOCYTES # BLD: 3 K/UL (ref 0.8–3.5)
LYMPHOCYTES NFR BLD: 31 % (ref 12–49)
LYMPHOCYTES NFR BLD: 31 % (ref 12–49)
MCH RBC QN AUTO: 25.4 PG (ref 26–34)
MCH RBC QN AUTO: 25.4 PG (ref 26–34)
MCHC RBC AUTO-ENTMCNC: 30.4 G/DL (ref 30–36.5)
MCHC RBC AUTO-ENTMCNC: 30.4 G/DL (ref 30–36.5)
MCV RBC AUTO: 83.4 FL (ref 80–99)
MCV RBC AUTO: 83.4 FL (ref 80–99)
MONOCYTES # BLD: 0.6 K/UL (ref 0–1)
MONOCYTES # BLD: 0.6 K/UL (ref 0–1)
MONOCYTES NFR BLD: 6 % (ref 5–13)
MONOCYTES NFR BLD: 6 % (ref 5–13)
NEUTS SEG # BLD: 5.6 K/UL (ref 1.8–8)
NEUTS SEG # BLD: 5.6 K/UL (ref 1.8–8)
NEUTS SEG NFR BLD: 58 % (ref 32–75)
NEUTS SEG NFR BLD: 58 % (ref 32–75)
NRBC # BLD: 0 K/UL (ref 0–0.01)
NRBC # BLD: 0 K/UL (ref 0–0.01)
NRBC BLD-RTO: 0 PER 100 WBC
NRBC BLD-RTO: 0 PER 100 WBC
PATH REV BLD -IMP: ABNORMAL
PLATELET # BLD AUTO: 22 K/UL (ref 150–400)
PLATELET # BLD AUTO: 22 K/UL (ref 150–400)
PLATELET COMMENT: ABNORMAL
PLATELET COMMENT: ABNORMAL
RBC # BLD AUTO: 3.43 M/UL (ref 3.8–5.2)
RBC # BLD AUTO: 3.43 M/UL (ref 3.8–5.2)
RBC MORPH BLD: ABNORMAL
WBC # BLD AUTO: 9.7 K/UL (ref 3.6–11)
WBC # BLD AUTO: 9.7 K/UL (ref 3.6–11)

## 2024-04-30 RX ORDER — PREDNISONE 10 MG/1
TABLET ORAL
Qty: 105 TABLET | Refills: 0 | Status: SHIPPED | OUTPATIENT
Start: 2024-04-30 | End: 2024-05-25

## 2024-04-30 NOTE — TELEPHONE ENCOUNTER
Session code: [id number]    Let patient know that platelets dropped to 22.     Advised per Dr. Pop to start prednisone 80 mg x 5 days, then 60 mg x 5 days, then 40 mg x 5 days, then 20 mg x 5 days, then 10 mg x 5 days, then off. Please get repeat labs on Friday. She needs to get her H pylori breath test done as well.    We will need to monitor CBC weekly while tapering off prednisone.    Patient verbalized understanding. No further needs.

## 2024-04-30 NOTE — PROGRESS NOTES
Writer sent [facility] Financial Assistance Program (FAP) paperwork via [employer] to Financial Counselor(s): Nichelle Martell and Sandra Leslie    Referral/Handouts:   Financial/Medication assistance referral        remains available for further assistance as needed.     Signed By:  Gavi Velazquez [facility]  Oncology Social Worker  [phone]

## 2024-04-30 NOTE — TELEPHONE ENCOUNTER
Verified patient ID x 2    Keyona from [facility] lab called to report critical result for patient:    Platelets- 22

## 2024-05-03 ENCOUNTER — TELEPHONE (OUTPATIENT)
Age: 34
End: 2024-05-03

## 2024-05-03 DIAGNOSIS — D69.3 ACUTE IDIOPATHIC THROMBOCYTOPENIC PURPURA (HCC): ICD-10-CM

## 2024-05-03 DIAGNOSIS — D50.0 IRON DEFICIENCY ANEMIA DUE TO CHRONIC BLOOD LOSS: ICD-10-CM

## 2024-05-04 LAB
BASOPHILS # BLD: 0 K/UL (ref 0–0.1)
BASOPHILS NFR BLD: 0 % (ref 0–1)
DIFFERENTIAL METHOD BLD: ABNORMAL
EOSINOPHIL # BLD: 0 K/UL (ref 0–0.4)
EOSINOPHIL NFR BLD: 0 % (ref 0–7)
ERYTHROCYTE [DISTWIDTH] IN BLOOD BY AUTOMATED COUNT: 18.2 % (ref 11.5–14.5)
FERRITIN SERPL-MCNC: 27 NG/ML (ref 26–388)
HCT VFR BLD AUTO: 30.9 % (ref 35–47)
HGB BLD-MCNC: 9.9 G/DL (ref 11.5–16)
IMM GRANULOCYTES # BLD AUTO: 0 K/UL (ref 0–0.04)
IMM GRANULOCYTES NFR BLD AUTO: 0 % (ref 0–0.5)
IRON SATN MFR SERPL: 5 % (ref 20–50)
IRON SERPL-MCNC: 20 UG/DL (ref 35–150)
LYMPHOCYTES # BLD: 1.5 K/UL (ref 0.8–3.5)
LYMPHOCYTES NFR BLD: 16 % (ref 12–49)
MCH RBC QN AUTO: 26.6 PG (ref 26–34)
MCHC RBC AUTO-ENTMCNC: 32 G/DL (ref 30–36.5)
MCV RBC AUTO: 83.1 FL (ref 80–99)
MONOCYTES # BLD: 0.3 K/UL (ref 0–1)
MONOCYTES NFR BLD: 3 % (ref 5–13)
NEUTS SEG # BLD: 7.3 K/UL (ref 1.8–8)
NEUTS SEG NFR BLD: 81 % (ref 32–75)
NRBC # BLD: 0 K/UL (ref 0–0.01)
NRBC BLD-RTO: 0 PER 100 WBC
PLATELET # BLD AUTO: 11 K/UL (ref 150–400)
RBC # BLD AUTO: 3.72 M/UL (ref 3.8–5.2)
RBC MORPH BLD: ABNORMAL
TIBC SERPL-MCNC: 434 UG/DL (ref 250–450)
WBC # BLD AUTO: 9.1 K/UL (ref 3.6–11)

## 2024-05-04 NOTE — TELEPHONE ENCOUNTER
--- [id number] Session.      Sami left 2 voicemails for patient.   Called with critical PLT value of 11K.    Platelets have slowly dwindled over the past week despite prednisone use. Patient had a PLT check late Friday afternoon at 4:15pm and the results came back late this evening.    Please contact us if any new questions or concerns.    Non-Urgent Matters: Call our clinic at [phone] during open clinic hours. Please note that [employer] Messages may not be immediately returned.  Urgent Matters: Call hospital  ([place], or [facility]) at night or on weekends for questions that cannot wait until clinic opens.  Emergency: Call 9-1-1.    Vishnu Beckwith MD  Hematology/Medical Oncology Provider  [facility]  P: [phone]      CC:  Sabina Pop MD

## 2024-05-04 NOTE — TELEPHONE ENCOUNTER
--- discussed with patient through telephone  that it seems that the steroids of prednisone 80mg daily does not seem to be effective.      We discussed that safest approach would likely have her go to the ER.      However, she is declining to do so; denies any current bleeding besides her period bleeding. The next best plan is to see if we can increase her Prednisone from 80 to 100mg daily for Saturday and Sunday and then have her come to 's office on  Monday morning.  Ideally would typically give high dose dexamethasone 40mg daily x 4.    Reviewed alarm signs/symptoms to pursue emergency care for either reliable  or calling 9-1-1.    Discussed with patient that I will have Dr. Pop's office csall her on  Monday.    Imelda:   Code  [id number].      Please contact us if any new questions or concerns.    Non-Urgent Matters: Call our clinic at [phone] during open clinic hours. Please note that [employer] Messages may not be immediately returned.  Urgent Matters: Call hospital  ([place], or [facility]) at night or on weekends for questions that cannot wait until clinic opens.  Emergency: Call 9-1-1.    Vishnu Beckwith MD  Hematology/Medical Oncology Provider  [facility]  P: [phone]    CC:  Dr. Sabina Pop MD

## 2024-05-06 ENCOUNTER — HOSPITAL ENCOUNTER (OUTPATIENT)
Facility: HOSPITAL | Age: 34
Setting detail: INFUSION SERIES
Discharge: HOME | End: 2024-05-06
Payer: MEDICAID

## 2024-05-06 DIAGNOSIS — D69.3 ACUTE IDIOPATHIC THROMBOCYTOPENIC PURPURA (HCC): Primary | ICD-10-CM

## 2024-05-06 DIAGNOSIS — D69.6 THROMBOCYTOPENIA (HCC): ICD-10-CM

## 2024-05-06 LAB
BASOPHILS # BLD: 0 K/UL (ref 0–0.1)
BASOPHILS NFR BLD: 0 % (ref 0–1)
DIFFERENTIAL METHOD BLD: ABNORMAL
EOSINOPHIL # BLD: 0 K/UL (ref 0–0.4)
EOSINOPHIL NFR BLD: 0 % (ref 0–7)
ERYTHROCYTE [DISTWIDTH] IN BLOOD BY AUTOMATED COUNT: 17.6 % (ref 11.5–14.5)
HCT VFR BLD AUTO: 32.1 % (ref 35–47)
HGB BLD-MCNC: 9.8 G/DL (ref 11.5–16)
IMM GRANULOCYTES # BLD AUTO: 0.1 K/UL (ref 0–0.04)
IMM GRANULOCYTES NFR BLD AUTO: 1 % (ref 0–0.5)
LYMPHOCYTES # BLD: 2.2 K/UL (ref 0.8–3.5)
LYMPHOCYTES NFR BLD: 18 % (ref 12–49)
MCH RBC QN AUTO: 25.5 PG (ref 26–34)
MCHC RBC AUTO-ENTMCNC: 30.5 G/DL (ref 30–36.5)
MCV RBC AUTO: 83.6 FL (ref 80–99)
MONOCYTES # BLD: 0.5 K/UL (ref 0–1)
MONOCYTES NFR BLD: 4 % (ref 5–13)
NEUTS SEG # BLD: 9.3 K/UL (ref 1.8–8)
NEUTS SEG NFR BLD: 77 % (ref 32–75)
NRBC # BLD: 0 K/UL (ref 0–0.01)
NRBC BLD-RTO: 0 PER 100 WBC
PLATELET # BLD AUTO: 75 K/UL (ref 150–400)
PMV BLD AUTO: 11.5 FL (ref 8.9–12.9)
RBC # BLD AUTO: 3.84 M/UL (ref 3.8–5.2)
RBC MORPH BLD: ABNORMAL
WBC # BLD AUTO: 12.1 K/UL (ref 3.6–11)

## 2024-05-06 PROCEDURE — 36415 COLL VENOUS BLD VENIPUNCTURE: CPT

## 2024-05-06 PROCEDURE — 85025 COMPLETE CBC W/AUTO DIFF WBC: CPT

## 2024-05-06 RX ORDER — DEXAMETHASONE 4 MG/1
40 TABLET ORAL
Qty: 40 TABLET | Refills: 0 | Status: SHIPPED | OUTPATIENT
Start: 2024-05-06 | End: 2024-05-10

## 2024-05-06 NOTE — PROGRESS NOTES
[facility]  1430: Pt arrived ambulatory to [facility] for labs in stable condition. Labs drawn and sent for processing.Ms. Billy Barkley tolerated the procedure and had no complaints.2x2 and coban placed. Ms. Billy Barkley [facility] condition. Patient is aware of next scheduled [facility] appointment.     Future Appointments   Date Time Provider Department Center   5/9/2024  9:00 AM [facility] LAB CHAIR 1 [facility]   5/9/2024  9:20 AM Sabina Pop MD [facility] BS AMB

## 2024-05-07 ENCOUNTER — TELEPHONE (OUTPATIENT)
Age: 34
End: 2024-05-07

## 2024-05-07 NOTE — TELEPHONE ENCOUNTER
1151     Patient HIPAA verified x2     Called patient using lang. Line.     Letting patient know \" that her platelets have improved to 75.   We will continue with our plan - dexamethasone x4 days and recheck with office visit on 5/9. \"    Patient verbalized understanding with no question or concerns at this time.

## 2024-05-09 ENCOUNTER — HOSPITAL ENCOUNTER (OUTPATIENT)
Facility: HOSPITAL | Age: 34
Setting detail: INFUSION SERIES
Discharge: HOME | End: 2024-05-09
Payer: MEDICAID

## 2024-05-09 ENCOUNTER — OFFICE VISIT (OUTPATIENT)
Age: 34
End: 2024-05-09
Payer: MEDICAID

## 2024-05-09 VITALS
SYSTOLIC BLOOD PRESSURE: 121 MMHG | WEIGHT: 179.5 LBS | RESPIRATION RATE: 18 BRPM | BODY MASS INDEX: 40.24 KG/M2 | HEART RATE: 76 BPM | TEMPERATURE: 97.7 F | OXYGEN SATURATION: 99 % | DIASTOLIC BLOOD PRESSURE: 96 MMHG

## 2024-05-09 VITALS
RESPIRATION RATE: 16 BRPM | SYSTOLIC BLOOD PRESSURE: 121 MMHG | OXYGEN SATURATION: 99 % | TEMPERATURE: 97.7 F | DIASTOLIC BLOOD PRESSURE: 69 MMHG | HEART RATE: 76 BPM

## 2024-05-09 DIAGNOSIS — D69.3 ACUTE IDIOPATHIC THROMBOCYTOPENIC PURPURA (HCC): Primary | ICD-10-CM

## 2024-05-09 DIAGNOSIS — D50.0 IRON DEFICIENCY ANEMIA DUE TO CHRONIC BLOOD LOSS: ICD-10-CM

## 2024-05-09 DIAGNOSIS — K59.00 CONSTIPATION, UNSPECIFIED CONSTIPATION TYPE: ICD-10-CM

## 2024-05-09 DIAGNOSIS — D69.3 ACUTE IDIOPATHIC THROMBOCYTOPENIC PURPURA (HCC): ICD-10-CM

## 2024-05-09 LAB
BASOPHILS # BLD: 0 K/UL (ref 0–0.1)
BASOPHILS NFR BLD: 0 % (ref 0–1)
DIFFERENTIAL METHOD BLD: ABNORMAL
EOSINOPHIL # BLD: 0 K/UL (ref 0–0.4)
EOSINOPHIL NFR BLD: 0 % (ref 0–7)
ERYTHROCYTE [DISTWIDTH] IN BLOOD BY AUTOMATED COUNT: 17.8 % (ref 11.5–14.5)
HCT VFR BLD AUTO: 31.9 % (ref 35–47)
HGB BLD-MCNC: 10.2 G/DL (ref 11.5–16)
IMM GRANULOCYTES # BLD AUTO: 0.2 K/UL (ref 0–0.04)
IMM GRANULOCYTES NFR BLD AUTO: 1 % (ref 0–0.5)
LYMPHOCYTES # BLD: 3 K/UL (ref 0.8–3.5)
LYMPHOCYTES NFR BLD: 19 % (ref 12–49)
MCH RBC QN AUTO: 26 PG (ref 26–34)
MCHC RBC AUTO-ENTMCNC: 32 G/DL (ref 30–36.5)
MCV RBC AUTO: 81.2 FL (ref 80–99)
MONOCYTES # BLD: 0.9 K/UL (ref 0–1)
MONOCYTES NFR BLD: 6 % (ref 5–13)
NEUTS SEG # BLD: 11.5 K/UL (ref 1.8–8)
NEUTS SEG NFR BLD: 74 % (ref 32–75)
NRBC # BLD: 0 K/UL (ref 0–0.01)
NRBC BLD-RTO: 0 PER 100 WBC
PLATELET # BLD AUTO: 142 K/UL (ref 150–400)
PMV BLD AUTO: 11.9 FL (ref 8.9–12.9)
RBC # BLD AUTO: 3.93 M/UL (ref 3.8–5.2)
WBC # BLD AUTO: 15.6 K/UL (ref 3.6–11)

## 2024-05-09 PROCEDURE — 85025 COMPLETE CBC W/AUTO DIFF WBC: CPT

## 2024-05-09 PROCEDURE — 36415 COLL VENOUS BLD VENIPUNCTURE: CPT

## 2024-05-09 PROCEDURE — 99213 OFFICE O/P EST LOW 20 MIN: CPT

## 2024-05-09 RX ORDER — DOCUSATE SODIUM 100 MG/1
100 CAPSULE, LIQUID FILLED ORAL DAILY
Qty: 30 CAPSULE | Refills: 3 | Status: SHIPPED | OUTPATIENT
Start: 2024-05-09 | End: 2024-09-06

## 2024-05-09 ASSESSMENT — PAIN SCALES - GENERAL: PAINLEVEL_OUTOF10: 0

## 2024-05-09 NOTE — PROGRESS NOTES
Aixa Barkley is a 33 y.o. female     Chief Complaint   Patient presents with    Follow-up     ITP and anemia     1. Have you been to the ER, urgent care clinic since your last visit?  Hospitalized since your last visit?No    2. Have you seen or consulted any other health care providers outside of the [facility] since your last visit?  Include any pap smears or colon screening. No      
04:03 AM    K 3.7 04/13/2024 04:03 AM     04/13/2024 04:03 AM    CO2 22 04/13/2024 04:03 AM    BUN 8 04/13/2024 04:03 AM     Lab Results   Component Value Date/Time    ALT 27 04/13/2024 04:03 AM    GLOB 4.9 04/13/2024 04:03 AM     Outside records: Reviewed extensively and pertinent information reviewed under HPI

## 2024-05-09 NOTE — PROGRESS NOTES
[facility] Short Note  Date: May 9, 2024  Pt admit to [facility] for labs ambulatory in stable condition. Labs drawn and sent for processing.  Patient Vitals for the past 12 hrs:   Temp Pulse Resp BP SpO2   05/09/24 0906 97.7 °F (36.5 °C) 76 16 121/69 99 %     Ms. Billy Barkley tolerated the procedure, and had no complaints.    Ms. Billy Barkley [facility] condition.     Future Appointments   Date Time Provider Department Center   5/9/2024  9:20 AM Sabina Pop MD [facility] BS AMB     NATAN YUSUF RN  May 9, 2024  9:15 AM

## 2024-05-10 ENCOUNTER — TELEPHONE (OUTPATIENT)
Age: 34
End: 2024-05-10

## 2024-05-10 NOTE — TELEPHONE ENCOUNTER
1213  Called pt using  [phone].  left VM on pts phone with the number to contact the OBGYN's office to schedule her appt. A referral has been faxed to their office for the pt.  Number to our office left also if pt has any questions or concerns.

## 2024-05-14 ENCOUNTER — TELEPHONE (OUTPATIENT)
Age: 34
End: 2024-05-14

## 2024-05-14 NOTE — TELEPHONE ENCOUNTER
FATOUMATA outreached pt with Patrice  #[phone] to provide update on application for the [facility] Financial Assistance Program. They are requesting a letter from pt's partner attesting to the fact he provides financial support to her. She will bring that to the office this Thursday 5/16.     FATOUMATA remains available for further assistance.      Signed by: Gavi Velazquez LMSW

## 2024-05-16 ENCOUNTER — TELEPHONE (OUTPATIENT)
Age: 34
End: 2024-05-16

## 2024-05-16 ENCOUNTER — HOSPITAL ENCOUNTER (OUTPATIENT)
Facility: HOSPITAL | Age: 34
Setting detail: INFUSION SERIES
Discharge: HOME | End: 2024-05-16
Payer: MEDICAID

## 2024-05-16 VITALS
HEART RATE: 68 BPM | TEMPERATURE: 97.8 F | RESPIRATION RATE: 18 BRPM | SYSTOLIC BLOOD PRESSURE: 104 MMHG | DIASTOLIC BLOOD PRESSURE: 69 MMHG

## 2024-05-16 DIAGNOSIS — D69.3 ACUTE IDIOPATHIC THROMBOCYTOPENIC PURPURA (HCC): ICD-10-CM

## 2024-05-16 DIAGNOSIS — D69.3 ACUTE IDIOPATHIC THROMBOCYTOPENIC PURPURA (HCC): Primary | ICD-10-CM

## 2024-05-16 PROCEDURE — 36415 COLL VENOUS BLD VENIPUNCTURE: CPT

## 2024-05-16 PROCEDURE — 85025 COMPLETE CBC W/AUTO DIFF WBC: CPT

## 2024-05-16 ASSESSMENT — PAIN SCALES - GENERAL: PAINLEVEL_OUTOF10: 0

## 2024-05-16 NOTE — TELEPHONE ENCOUNTER
1150     Patient HIPAA verifedx2     Called patient spouse ( jessica ) . Just reminding him.... patient needs to get a h.pylori stool test . Jessica stated that patient tried to go this morning but they had car trouble.     So she is going to tried to go again this afternoon around 1pm .     Told him that is fine we just want to make sure she get text done.     He verbalized understanding and had no question or concerns at this time

## 2024-05-16 NOTE — PROGRESS NOTES
[facility] Short Note  Date: May 16, 2024  Pt admit to [facility] for labs ambulatory in stable condition. Labs drawn peripherally from [facility] and sent for processing.  Patient Vitals for the past 12 hrs:   Temp Pulse Resp BP   05/16/24 1530 97.8 °F (36.6 °C) 68 18 104/69       Ms. Billy Barkley tolerated the procedure, and had no complaints.    Ms. Billy Barkley was [facility] in stable condition.     Future Appointments   Date Time Provider Department Center   5/23/2024  3:30 PM [facility] LAB CHAIR 1 [facility]   5/30/2024  3:30 PM [facility] LAB CHAIR 1 [facility]   6/7/2024  9:00 AM [facility] LAB CHAIR 1 [facility]   6/7/2024 10:00 AM Prerna Mcfarland APRN - CNP [facility] BS AMB   6/13/2024 11:00 AM [facility] LAB CHAIR 1 [facility]   6/20/2024 10:00 AM [facility] CHAIR 1 [facility]   6/27/2024 10:00 AM [facility] LAB CHAIR 1 [facility]     TERRENCE HUGHES RN  May 16, 2024  3:45 PM

## 2024-05-17 ENCOUNTER — TELEPHONE (OUTPATIENT)
Age: 34
End: 2024-05-17

## 2024-05-17 LAB
BASOPHILS # BLD: 0 K/UL (ref 0–0.1)
BASOPHILS NFR BLD: 0 % (ref 0–1)
DIFFERENTIAL METHOD BLD: ABNORMAL
EOSINOPHIL # BLD: 0.1 K/UL (ref 0–0.4)
EOSINOPHIL NFR BLD: 1 % (ref 0–7)
ERYTHROCYTE [DISTWIDTH] IN BLOOD BY AUTOMATED COUNT: 19 % (ref 11.5–14.5)
HCT VFR BLD AUTO: 34.5 % (ref 35–47)
HGB BLD-MCNC: 11.1 G/DL (ref 11.5–16)
IMM GRANULOCYTES # BLD AUTO: 0.1 K/UL (ref 0–0.04)
IMM GRANULOCYTES NFR BLD AUTO: 1 % (ref 0–0.5)
LYMPHOCYTES # BLD: 4.3 K/UL (ref 0.8–3.5)
LYMPHOCYTES NFR BLD: 32 % (ref 12–49)
MCH RBC QN AUTO: 26.6 PG (ref 26–34)
MCHC RBC AUTO-ENTMCNC: 32.2 G/DL (ref 30–36.5)
MCV RBC AUTO: 82.5 FL (ref 80–99)
MONOCYTES # BLD: 0.7 K/UL (ref 0–1)
MONOCYTES NFR BLD: 5 % (ref 5–13)
NEUTS SEG # BLD: 8.3 K/UL (ref 1.8–8)
NEUTS SEG NFR BLD: 61 % (ref 32–75)
NRBC # BLD: 0 K/UL (ref 0–0.01)
NRBC BLD-RTO: 0 PER 100 WBC
PLATELET # BLD AUTO: 22 K/UL (ref 150–400)
RBC # BLD AUTO: 4.18 M/UL (ref 3.8–5.2)
RBC MORPH BLD: ABNORMAL
WBC # BLD AUTO: 13.5 K/UL (ref 3.6–11)

## 2024-05-17 NOTE — TELEPHONE ENCOUNTER
Verified patient ID x 2    Pharmacy does not have 20 mg tablets in stock. Advised that as long as it equals 40 mg per day, they can use what they have in stock.     They will use 4 mg tablets, so patient will take 10 tabs for four days to equal 40 mg per day. No further needs.    Hemorrhoids Need for prophylactic measure

## 2024-05-17 NOTE — TELEPHONE ENCOUNTER
Haile from [facility] called requesting a clarification on pt's prescription Dexamethasone 20mg. Call back number is [phone].

## 2024-05-17 NOTE — TELEPHONE ENCOUNTER
Session code: [id number]    Verified patient ID x 2    Let patient know per Dr. Flower that patient's platelets are 22 and we need to retreat with dexamethasone 40 mg x 4 days. We also need to give patient one dose of IVIG.    Our  is working on getting patient in for IVIG and an office visit with Dr. Flower next week, and will reach out to patient with  once appointments are set.     Patient should go to pharmacy asap and start steroid today.     Patient verbalized understanding.

## 2024-05-18 LAB — H PYLORI AG STL QL IA: NEGATIVE

## 2024-05-23 ENCOUNTER — HOSPITAL ENCOUNTER (OUTPATIENT)
Facility: HOSPITAL | Age: 34
Setting detail: INFUSION SERIES
Discharge: HOME | End: 2024-05-23
Payer: MEDICAID

## 2024-05-23 ENCOUNTER — OFFICE VISIT (OUTPATIENT)
Age: 34
End: 2024-05-23
Payer: MEDICAID

## 2024-05-23 ENCOUNTER — APPOINTMENT (OUTPATIENT)
Facility: HOSPITAL | Age: 34
End: 2024-05-23
Payer: MEDICAID

## 2024-05-23 VITALS
TEMPERATURE: 97.7 F | BODY MASS INDEX: 39.37 KG/M2 | DIASTOLIC BLOOD PRESSURE: 68 MMHG | HEART RATE: 72 BPM | HEIGHT: 56 IN | SYSTOLIC BLOOD PRESSURE: 114 MMHG | WEIGHT: 175 LBS | RESPIRATION RATE: 18 BRPM

## 2024-05-23 VITALS
HEART RATE: 70 BPM | SYSTOLIC BLOOD PRESSURE: 117 MMHG | BODY MASS INDEX: 39.23 KG/M2 | RESPIRATION RATE: 18 BRPM | OXYGEN SATURATION: 99 % | DIASTOLIC BLOOD PRESSURE: 67 MMHG | WEIGHT: 175 LBS | TEMPERATURE: 97.7 F

## 2024-05-23 DIAGNOSIS — D69.3 ACUTE IDIOPATHIC THROMBOCYTOPENIC PURPURA (HCC): Primary | ICD-10-CM

## 2024-05-23 LAB
BASOPHILS # BLD: 0 K/UL (ref 0–0.1)
BASOPHILS NFR BLD: 0 % (ref 0–1)
DIFFERENTIAL METHOD BLD: ABNORMAL
EOSINOPHIL # BLD: 0 K/UL (ref 0–0.4)
EOSINOPHIL NFR BLD: 0 % (ref 0–7)
ERYTHROCYTE [DISTWIDTH] IN BLOOD BY AUTOMATED COUNT: 19.2 % (ref 11.5–14.5)
HCT VFR BLD AUTO: 35 % (ref 35–47)
HGB BLD-MCNC: 11.4 G/DL (ref 11.5–16)
IMM GRANULOCYTES # BLD AUTO: 0.2 K/UL (ref 0–0.04)
IMM GRANULOCYTES NFR BLD AUTO: 1 % (ref 0–0.5)
LYMPHOCYTES # BLD: 1.6 K/UL (ref 0.8–3.5)
LYMPHOCYTES NFR BLD: 10 % (ref 12–49)
MCH RBC QN AUTO: 26.5 PG (ref 26–34)
MCHC RBC AUTO-ENTMCNC: 32.6 G/DL (ref 30–36.5)
MCV RBC AUTO: 81.4 FL (ref 80–99)
MONOCYTES # BLD: 0.6 K/UL (ref 0–1)
MONOCYTES NFR BLD: 4 % (ref 5–13)
NEUTS SEG # BLD: 13.6 K/UL (ref 1.8–8)
NEUTS SEG NFR BLD: 85 % (ref 32–75)
NRBC # BLD: 0 K/UL (ref 0–0.01)
NRBC BLD-RTO: 0 PER 100 WBC
PLATELET # BLD AUTO: 74 K/UL (ref 150–400)
RBC # BLD AUTO: 4.3 M/UL (ref 3.8–5.2)
RBC MORPH BLD: ABNORMAL
WBC # BLD AUTO: 16 K/UL (ref 3.6–11)

## 2024-05-23 PROCEDURE — 99215 OFFICE O/P EST HI 40 MIN: CPT | Performed by: INTERNAL MEDICINE

## 2024-05-23 PROCEDURE — 6360000002 HC RX W HCPCS

## 2024-05-23 PROCEDURE — 85025 COMPLETE CBC W/AUTO DIFF WBC: CPT

## 2024-05-23 PROCEDURE — 96366 THER/PROPH/DIAG IV INF ADDON: CPT

## 2024-05-23 PROCEDURE — 6370000000 HC RX 637 (ALT 250 FOR IP)

## 2024-05-23 PROCEDURE — 96365 THER/PROPH/DIAG IV INF INIT: CPT

## 2024-05-23 PROCEDURE — 2580000003 HC RX 258

## 2024-05-23 PROCEDURE — 36415 COLL VENOUS BLD VENIPUNCTURE: CPT

## 2024-05-23 RX ORDER — ACETAMINOPHEN 325 MG/1
650 TABLET ORAL ONCE
Status: CANCELLED | OUTPATIENT
Start: 2024-05-23 | End: 2024-05-23

## 2024-05-23 RX ORDER — ONDANSETRON 2 MG/ML
8 INJECTION INTRAMUSCULAR; INTRAVENOUS
OUTPATIENT
Start: 2024-05-23

## 2024-05-23 RX ORDER — DIPHENHYDRAMINE HYDROCHLORIDE 50 MG/ML
50 INJECTION INTRAMUSCULAR; INTRAVENOUS
Status: CANCELLED | OUTPATIENT
Start: 2024-05-23

## 2024-05-23 RX ORDER — ACETAMINOPHEN 325 MG/1
650 TABLET ORAL ONCE
Status: COMPLETED | OUTPATIENT
Start: 2024-05-23 | End: 2024-05-23

## 2024-05-23 RX ORDER — SODIUM CHLORIDE 0.9 % (FLUSH) 0.9 %
5-40 SYRINGE (ML) INJECTION PRN
OUTPATIENT
Start: 2024-05-23

## 2024-05-23 RX ORDER — SODIUM CHLORIDE 9 MG/ML
5-250 INJECTION, SOLUTION INTRAVENOUS PRN
Status: DISCONTINUED | OUTPATIENT
Start: 2024-05-23 | End: 2024-05-24 | Stop reason: HOSPADM

## 2024-05-23 RX ORDER — SODIUM CHLORIDE 9 MG/ML
5-250 INJECTION, SOLUTION INTRAVENOUS PRN
OUTPATIENT
Start: 2024-05-23

## 2024-05-23 RX ORDER — MEPERIDINE HYDROCHLORIDE 25 MG/ML
12.5 INJECTION INTRAMUSCULAR; INTRAVENOUS; SUBCUTANEOUS PRN
Status: CANCELLED | OUTPATIENT
Start: 2024-05-23

## 2024-05-23 RX ORDER — DIPHENHYDRAMINE HCL 25 MG
25 CAPSULE ORAL ONCE
Status: CANCELLED | OUTPATIENT
Start: 2024-05-23 | End: 2024-05-23

## 2024-05-23 RX ORDER — ONDANSETRON 2 MG/ML
8 INJECTION INTRAMUSCULAR; INTRAVENOUS
Status: CANCELLED | OUTPATIENT
Start: 2024-05-23

## 2024-05-23 RX ORDER — ACETAMINOPHEN 325 MG/1
650 TABLET ORAL
Status: CANCELLED | OUTPATIENT
Start: 2024-05-23

## 2024-05-23 RX ORDER — SODIUM CHLORIDE 0.9 % (FLUSH) 0.9 %
5-40 SYRINGE (ML) INJECTION PRN
Status: CANCELLED | OUTPATIENT
Start: 2024-05-23

## 2024-05-23 RX ORDER — ELTROMBOPAG OLAMINE 50 MG/1
50 TABLET, FILM COATED ORAL DAILY
Qty: 30 TABLET | Refills: 0 | Status: SHIPPED | OUTPATIENT
Start: 2024-05-23

## 2024-05-23 RX ORDER — ALBUTEROL SULFATE 90 UG/1
4 AEROSOL, METERED RESPIRATORY (INHALATION) PRN
OUTPATIENT
Start: 2024-05-23

## 2024-05-23 RX ORDER — SODIUM CHLORIDE 9 MG/ML
5-250 INJECTION, SOLUTION INTRAVENOUS PRN
Status: CANCELLED | OUTPATIENT
Start: 2024-05-23

## 2024-05-23 RX ORDER — SODIUM CHLORIDE 9 MG/ML
INJECTION, SOLUTION INTRAVENOUS CONTINUOUS
OUTPATIENT
Start: 2024-05-23

## 2024-05-23 RX ORDER — EPINEPHRINE 1 MG/ML
0.3 INJECTION, SOLUTION INTRAMUSCULAR; SUBCUTANEOUS PRN
OUTPATIENT
Start: 2024-05-23

## 2024-05-23 RX ORDER — ACETAMINOPHEN 325 MG/1
650 TABLET ORAL
OUTPATIENT
Start: 2024-05-23

## 2024-05-23 RX ORDER — DIPHENHYDRAMINE HYDROCHLORIDE 50 MG/ML
50 INJECTION INTRAMUSCULAR; INTRAVENOUS
OUTPATIENT
Start: 2024-05-23

## 2024-05-23 RX ORDER — HEPARIN 100 UNIT/ML
500 SYRINGE INTRAVENOUS PRN
OUTPATIENT
Start: 2024-05-23

## 2024-05-23 RX ORDER — EPINEPHRINE 1 MG/ML
0.3 INJECTION, SOLUTION INTRAMUSCULAR; SUBCUTANEOUS PRN
Status: CANCELLED | OUTPATIENT
Start: 2024-05-23

## 2024-05-23 RX ORDER — ALBUTEROL SULFATE 90 UG/1
4 AEROSOL, METERED RESPIRATORY (INHALATION) PRN
Status: CANCELLED | OUTPATIENT
Start: 2024-05-23

## 2024-05-23 RX ORDER — HEPARIN 100 UNIT/ML
500 SYRINGE INTRAVENOUS PRN
Status: CANCELLED | OUTPATIENT
Start: 2024-05-23

## 2024-05-23 RX ORDER — SODIUM CHLORIDE 9 MG/ML
INJECTION, SOLUTION INTRAVENOUS CONTINUOUS
Status: CANCELLED | OUTPATIENT
Start: 2024-05-23

## 2024-05-23 RX ORDER — DIPHENHYDRAMINE HCL 25 MG
25 CAPSULE ORAL ONCE
Status: COMPLETED | OUTPATIENT
Start: 2024-05-23 | End: 2024-05-23

## 2024-05-23 RX ORDER — MEPERIDINE HYDROCHLORIDE 25 MG/ML
12.5 INJECTION INTRAMUSCULAR; INTRAVENOUS; SUBCUTANEOUS PRN
OUTPATIENT
Start: 2024-05-23

## 2024-05-23 RX ADMIN — ACETAMINOPHEN 650 MG: 325 TABLET ORAL at 10:30

## 2024-05-23 RX ADMIN — DIPHENHYDRAMINE HYDROCHLORIDE 25 MG: 25 CAPSULE ORAL at 10:30

## 2024-05-23 RX ADMIN — IMMUNE GLOBULIN (HUMAN) 45000 MG: 10 INJECTION INTRAVENOUS; SUBCUTANEOUS at 10:55

## 2024-05-23 RX ADMIN — SODIUM CHLORIDE 50 ML/HR: 9 INJECTION, SOLUTION INTRAVENOUS at 10:26

## 2024-05-23 NOTE — PROGRESS NOTES
Aixa Barkley is a 33 y.o. female    Chief Complaint   Patient presents with    Follow-up      ITP and anemia.       1. Have you been to the ER, urgent care clinic since your last visit?  Hospitalized since your last visit?No    2. Have you seen or consulted any other health care providers outside of the [facility] since your last visit?  Include any pap smears or colon screening. No          
  Chart reviewed, interval events since last visit noted.     History obtained via .  Acute ITP relapse with platelets 22k on 4/29/24. Started prednisone 80 mg on 4/30. Repeat labs on 5/3 showed platelets 11k. Advised by on-call physician to increase steroids to 100. Switched to dexamethasone on 5/6. Re-check (prior to switching) with platelets 75k.    She presents today for follow up.   On Dex since 5/17 when platelets dropped from 142k to 22k  She has no bleeding.     Medications reviewed in the EMR.  No Known Allergies     Review of Systems: A 6-point review of systems was obtained, negative except as reviewed in the HPI.    Physical Exam:   /67 (Site: Left Upper Arm, Position: Sitting)   Pulse 70   Temp 97.7 °F (36.5 °C)   Resp 18   Wt 79.4 kg (175 lb)   SpO2 99%   BMI 39.23 kg/m²     General: No distress, pleasant  Eyes: Anicteric sclerae, +conjunctival pallor  Respiratory: Normal respiratory effort  CV: No peripheral edema  Skin: No rashes, ecchymoses, or petechiae  Psych: Alert, oriented, appropriate affect, normal judgment/insight    Results:     Lab Results   Component Value Date/Time    WBC 13.5 05/16/2024 03:33 PM    HGB 11.1 05/16/2024 03:33 PM    HCT 34.5 05/16/2024 03:33 PM    PLT 22 05/16/2024 03:33 PM    MCV 82.5 05/16/2024 03:33 PM     Lab Results   Component Value Date/Time     04/13/2024 04:03 AM    K 3.7 04/13/2024 04:03 AM     04/13/2024 04:03 AM    CO2 22 04/13/2024 04:03 AM    BUN 8 04/13/2024 04:03 AM     Lab Results   Component Value Date/Time    ALT 27 04/13/2024 04:03 AM    GLOB 4.9 04/13/2024 04:03 AM     Outside records: Reviewed extensively and pertinent information reviewed under HPI

## 2024-05-24 ENCOUNTER — CLINICAL DOCUMENTATION (OUTPATIENT)
Age: 34
End: 2024-05-24

## 2024-05-24 NOTE — PROGRESS NOTES
05/24/2024    Tried to call pt w/ language services to inform about free drug assistance with [employer] for Promacta. SHANNEN.

## 2024-05-28 ENCOUNTER — TELEPHONE (OUTPATIENT)
Age: 34
End: 2024-05-28

## 2024-05-28 NOTE — TELEPHONE ENCOUNTER
Patient  calling needing RX to be sent to [facility] on [address].  Please give  spouse a call when this has been done.  He is referencing the medication that is uses for her platelet.       # [phone]

## 2024-05-30 ENCOUNTER — HOSPITAL ENCOUNTER (OUTPATIENT)
Facility: HOSPITAL | Age: 34
Setting detail: INFUSION SERIES
Discharge: HOME | End: 2024-05-30
Payer: MEDICAID

## 2024-05-30 VITALS
SYSTOLIC BLOOD PRESSURE: 109 MMHG | RESPIRATION RATE: 18 BRPM | TEMPERATURE: 97.5 F | DIASTOLIC BLOOD PRESSURE: 55 MMHG | HEART RATE: 76 BPM

## 2024-05-30 DIAGNOSIS — D69.3 ACUTE IDIOPATHIC THROMBOCYTOPENIC PURPURA (HCC): ICD-10-CM

## 2024-05-30 LAB
BASOPHILS # BLD: 0 K/UL (ref 0–0.1)
BASOPHILS NFR BLD: 0 % (ref 0–1)
DIFFERENTIAL METHOD BLD: ABNORMAL
EOSINOPHIL # BLD: 0.2 K/UL (ref 0–0.4)
EOSINOPHIL NFR BLD: 2 % (ref 0–7)
ERYTHROCYTE [DISTWIDTH] IN BLOOD BY AUTOMATED COUNT: 18.4 % (ref 11.5–14.5)
HCT VFR BLD AUTO: 36.6 % (ref 35–47)
HGB BLD-MCNC: 12 G/DL (ref 11.5–16)
IMM GRANULOCYTES # BLD AUTO: 0.1 K/UL (ref 0–0.04)
IMM GRANULOCYTES NFR BLD AUTO: 1 % (ref 0–0.5)
LYMPHOCYTES # BLD: 3 K/UL (ref 0.8–3.5)
LYMPHOCYTES NFR BLD: 36 % (ref 12–49)
MCH RBC QN AUTO: 26.9 PG (ref 26–34)
MCHC RBC AUTO-ENTMCNC: 32.8 G/DL (ref 30–36.5)
MCV RBC AUTO: 82.1 FL (ref 80–99)
MONOCYTES # BLD: 0.6 K/UL (ref 0–1)
MONOCYTES NFR BLD: 7 % (ref 5–13)
NEUTS SEG # BLD: 4.5 K/UL (ref 1.8–8)
NEUTS SEG NFR BLD: 54 % (ref 32–75)
NRBC # BLD: 0 K/UL (ref 0–0.01)
NRBC BLD-RTO: 0 PER 100 WBC
PLATELET # BLD AUTO: 98 K/UL (ref 150–400)
PMV BLD AUTO: 12.3 FL (ref 8.9–12.9)
RBC # BLD AUTO: 4.46 M/UL (ref 3.8–5.2)
RBC MORPH BLD: ABNORMAL
WBC # BLD AUTO: 8.4 K/UL (ref 3.6–11)

## 2024-05-30 PROCEDURE — 36415 COLL VENOUS BLD VENIPUNCTURE: CPT

## 2024-05-30 PROCEDURE — 85025 COMPLETE CBC W/AUTO DIFF WBC: CPT

## 2024-05-30 ASSESSMENT — PAIN SCALES - GENERAL: PAINLEVEL_OUTOF10: 0

## 2024-05-30 NOTE — PROGRESS NOTES
[facility] Short Note  Date: May 30, 2024  Pt admit to [facility] for labs ambulatory in stable condition. Labs drawn peripherally from [facility] and sent for processing.  Patient Vitals for the past 12 hrs:   Temp Pulse Resp BP   05/30/24 1543 97.5 °F (36.4 °C) 76 18 (!) 109/55       Ms. Billy Barkley tolerated the procedure, and had no complaints.    Ms. Billy Barkley was [facility] in stable condition.     Future Appointments   Date Time Provider Department Center   6/7/2024  9:00 AM [facility] LAB CHAIR 1 [facility]   6/13/2024 11:00 AM [facility] LAB CHAIR 1 [facility]   6/20/2024 10:00 AM [facility] LAB CHAIR 1 [facility]   6/20/2024 10:20 AM Prerna Mcfarland APRN - CNP [facility] BS AMB   6/27/2024 10:00 AM [facility] LAB CHAIR 1 [facility]     TERRENCE HUGHES RN  May 30, 2024  3:59 PM

## 2024-05-31 ENCOUNTER — TELEPHONE (OUTPATIENT)
Age: 34
End: 2024-05-31

## 2024-05-31 NOTE — TELEPHONE ENCOUNTER
302    Patient HIPAA verified x2    Spoke with patient/ Patient Son  and let her know let patient know that her plt have improved to 98.  We will recheck next week.  Call office for s/s of bleeding.     Patient verbalized understanding with no question or concerns a this time

## 2024-06-05 DIAGNOSIS — D69.3 ACUTE IDIOPATHIC THROMBOCYTOPENIC PURPURA (HCC): ICD-10-CM

## 2024-06-05 RX ORDER — ELTROMBOPAG OLAMINE 50 MG/1
50 TABLET, FILM COATED ORAL DAILY
Qty: 30 TABLET | Refills: 0 | Status: ACTIVE | OUTPATIENT
Start: 2024-06-05

## 2024-06-07 ENCOUNTER — TELEPHONE (OUTPATIENT)
Age: 34
End: 2024-06-07

## 2024-06-07 ENCOUNTER — HOSPITAL ENCOUNTER (OUTPATIENT)
Facility: HOSPITAL | Age: 34
Setting detail: INFUSION SERIES
Discharge: HOME | End: 2024-06-07
Payer: MEDICAID

## 2024-06-07 DIAGNOSIS — D69.3 ACUTE IDIOPATHIC THROMBOCYTOPENIC PURPURA (HCC): Primary | ICD-10-CM

## 2024-06-07 LAB
BASOPHILS # BLD: 0.2 K/UL (ref 0–0.1)
BASOPHILS NFR BLD: 2 % (ref 0–1)
DIFFERENTIAL METHOD BLD: ABNORMAL
EOSINOPHIL # BLD: 0.2 K/UL (ref 0–0.4)
EOSINOPHIL NFR BLD: 3 % (ref 0–7)
ERYTHROCYTE [DISTWIDTH] IN BLOOD BY AUTOMATED COUNT: 17.9 % (ref 11.5–14.5)
HCT VFR BLD AUTO: 35.8 % (ref 35–47)
HGB BLD-MCNC: 11.5 G/DL (ref 11.5–16)
IMM GRANULOCYTES # BLD AUTO: 0 K/UL
IMM GRANULOCYTES NFR BLD AUTO: 0 %
LYMPHOCYTES # BLD: 2.7 K/UL (ref 0.8–3.5)
LYMPHOCYTES NFR BLD: 33 % (ref 12–49)
MCH RBC QN AUTO: 26.3 PG (ref 26–34)
MCHC RBC AUTO-ENTMCNC: 32.1 G/DL (ref 30–36.5)
MCV RBC AUTO: 81.7 FL (ref 80–99)
MONOCYTES # BLD: 0.4 K/UL (ref 0–1)
MONOCYTES NFR BLD: 5 % (ref 5–13)
NEUTS SEG # BLD: 4.7 K/UL (ref 1.8–8)
NEUTS SEG NFR BLD: 57 % (ref 32–75)
NRBC # BLD: 0 K/UL (ref 0–0.01)
NRBC BLD-RTO: 0 PER 100 WBC
PATH REV BLD -IMP: ABNORMAL
PLATELET # BLD AUTO: 20 K/UL (ref 150–400)
PLATELET COMMENT: ABNORMAL
RBC # BLD AUTO: 4.38 M/UL (ref 3.8–5.2)
RBC MORPH BLD: ABNORMAL
WBC # BLD AUTO: 8.2 K/UL (ref 3.6–11)

## 2024-06-07 PROCEDURE — 36415 COLL VENOUS BLD VENIPUNCTURE: CPT

## 2024-06-07 PROCEDURE — 85025 COMPLETE CBC W/AUTO DIFF WBC: CPT

## 2024-06-07 RX ORDER — EPINEPHRINE 1 MG/ML
0.3 INJECTION, SOLUTION, CONCENTRATE INTRAVENOUS PRN
OUTPATIENT
Start: 2024-06-10

## 2024-06-07 RX ORDER — ACETAMINOPHEN 325 MG/1
650 TABLET ORAL
OUTPATIENT
Start: 2024-06-10

## 2024-06-07 RX ORDER — SODIUM CHLORIDE 9 MG/ML
INJECTION, SOLUTION INTRAVENOUS CONTINUOUS
OUTPATIENT
Start: 2024-06-10

## 2024-06-07 RX ORDER — DIPHENHYDRAMINE HYDROCHLORIDE 50 MG/ML
50 INJECTION INTRAMUSCULAR; INTRAVENOUS
OUTPATIENT
Start: 2024-06-10

## 2024-06-07 RX ORDER — ONDANSETRON 2 MG/ML
8 INJECTION INTRAMUSCULAR; INTRAVENOUS
OUTPATIENT
Start: 2024-06-10

## 2024-06-07 RX ORDER — ALBUTEROL SULFATE 90 UG/1
4 AEROSOL, METERED RESPIRATORY (INHALATION) PRN
OUTPATIENT
Start: 2024-06-10

## 2024-06-07 NOTE — TELEPHONE ENCOUNTER
Session code:04699    Platelets are 20 today.     Any active bleeding? If so, ER.   -Patient denies any bleeding. Advised pt to go to ER is she develops any bleeding over the weekend.     Has not received promacta, correct?  -Correct, patient had not received promacta    Needs to start Nplate Monday and have VV with Dr. Bush.   -Advised pt to look out for call from [facility]  and OV

## 2024-06-07 NOTE — PROGRESS NOTES
[facility] Progress Note    Date: 2024    Name: Aixa Barkley    MRN: [id number]         : 1990      0900:  Pt arrived ambulatory and in no distress, for lab visit.  Labs drawn via left AC, patient tolerated well.            Departed [facility] ambulatory and in no distress.    Future Appointments   Date Time Provider Department Center   2024 11:00 AM [facility] LAB CHAIR 1 [facility]   2024 10:00 AM [facility] LAB CHAIR 1 [facility]   2024 10:20 AM Prerna Mcfarland, APRN - CNP [facility] BS AMB   2024 10:00 AM [facility] LAB CHAIR 1 [facility]   7/3/2024 11:00 AM [facility] LAB CHAIR 1 [facility]   2024 10:00 AM [facility] LAB CHAIR 1 [facility]   2024 10:00 AM [facility] LAB CHAIR 1 [facility]   2024 11:00 AM [facility] LAB CHAIR 1 [facility]   2024 10:00 AM [facility] LAB CHAIR 1 [facility]   2024 10:00 AM [facility] LAB CHAIR 1 [facility]       Nichelle Knight RN  2024

## 2024-06-07 NOTE — TELEPHONE ENCOUNTER
----- Message from KODAK Fuentes CNP sent at 6/7/2024  1:43 PM EDT -----  Shanta, I will order. Thanks!  Delma - please schedule patient to start weekly nplate. Starting on Monday 6/10. She also needs VV with Dr. Bush 6/10.   Crystal - please let patient know that her platelets are very low at 20. Can you inquire if she is having s/s of bleeding? Advise ED for active bleeding. Please let her know that we need to start her on nplate weekly injection on Monday. She will have a VV with Dr. Bush to further discuss. I do not think she received her promacta yet. Changing to nplate instead.   ----- Message -----  From: Petey Bush MD  Sent: 6/7/2024   1:36 PM EDT  To: KODAK Fuentes CNP; #    Start Romiplostim. This is injectable drug and there will be no delay in starting this medicine.   Shanta, please order it.   Prerna, who is your [facility] ? We will have to inform the person to get on the schedule for Romiplostim. I should see her virtually.  Thanks      ----- Message -----  From: Prerna Mcfarland APRN - CNP  Sent: 6/7/2024   1:32 PM EDT  To: Petey Bush MD    Hi Dr. Bush,   ITP patient with plt 20k today.  Previously didn't respond well to prednisone so we switched to dex.   Received IVIG 5/23.   She is supposed to be on promacta 50 mg but needed to apply for free-drug. I will check in on this.   Last dex 5/6-5/10.  I have not assessed active bleeding but will inquire when I call with plan.   Do I re-treat with dex?  Previously seen by Dr. Flower but she is on vacation.   Thank you!

## 2024-06-10 ENCOUNTER — TELEMEDICINE (OUTPATIENT)
Age: 34
End: 2024-06-10
Payer: MEDICAID

## 2024-06-10 ENCOUNTER — HOSPITAL ENCOUNTER (OUTPATIENT)
Facility: HOSPITAL | Age: 34
Setting detail: INFUSION SERIES
Discharge: HOME | End: 2024-06-10
Payer: MEDICAID

## 2024-06-10 VITALS
TEMPERATURE: 97.8 F | SYSTOLIC BLOOD PRESSURE: 112 MMHG | BODY MASS INDEX: 40.49 KG/M2 | RESPIRATION RATE: 18 BRPM | WEIGHT: 180 LBS | HEART RATE: 76 BPM | DIASTOLIC BLOOD PRESSURE: 58 MMHG | HEIGHT: 56 IN

## 2024-06-10 DIAGNOSIS — Z51.11 ENCOUNTER FOR ANTINEOPLASTIC CHEMOTHERAPY: ICD-10-CM

## 2024-06-10 DIAGNOSIS — D69.3 CHRONIC ITP (IDIOPATHIC THROMBOCYTOPENIA) (HCC): Primary | ICD-10-CM

## 2024-06-10 DIAGNOSIS — D69.3 ACUTE IDIOPATHIC THROMBOCYTOPENIC PURPURA (HCC): Primary | ICD-10-CM

## 2024-06-10 LAB
BASOPHILS # BLD: 0.1 K/UL (ref 0–0.1)
BASOPHILS NFR BLD: 1 % (ref 0–1)
DIFFERENTIAL METHOD BLD: ABNORMAL
EOSINOPHIL # BLD: 0.1 K/UL (ref 0–0.4)
EOSINOPHIL NFR BLD: 1 % (ref 0–7)
ERYTHROCYTE [DISTWIDTH] IN BLOOD BY AUTOMATED COUNT: 18.3 % (ref 11.5–14.5)
HCT VFR BLD AUTO: 35.1 % (ref 35–47)
HGB BLD-MCNC: 11.5 G/DL (ref 11.5–16)
IMM GRANULOCYTES # BLD AUTO: 0.1 K/UL (ref 0–0.04)
IMM GRANULOCYTES NFR BLD AUTO: 1 % (ref 0–0.5)
LYMPHOCYTES # BLD: 2.8 K/UL (ref 0.8–3.5)
LYMPHOCYTES NFR BLD: 36 % (ref 12–49)
MCH RBC QN AUTO: 26.9 PG (ref 26–34)
MCHC RBC AUTO-ENTMCNC: 32.8 G/DL (ref 30–36.5)
MCV RBC AUTO: 82.2 FL (ref 80–99)
MONOCYTES # BLD: 0.5 K/UL (ref 0–1)
MONOCYTES NFR BLD: 6 % (ref 5–13)
NEUTS SEG # BLD: 4.3 K/UL (ref 1.8–8)
NEUTS SEG NFR BLD: 55 % (ref 32–75)
NRBC # BLD: 0 K/UL (ref 0–0.01)
NRBC BLD-RTO: 0 PER 100 WBC
PLATELET # BLD AUTO: 32 K/UL (ref 150–400)
RBC # BLD AUTO: 4.27 M/UL (ref 3.8–5.2)
RBC MORPH BLD: ABNORMAL
WBC # BLD AUTO: 7.9 K/UL (ref 3.6–11)

## 2024-06-10 PROCEDURE — 36415 COLL VENOUS BLD VENIPUNCTURE: CPT

## 2024-06-10 PROCEDURE — 6360000002 HC RX W HCPCS

## 2024-06-10 PROCEDURE — 99215 OFFICE O/P EST HI 40 MIN: CPT | Performed by: INTERNAL MEDICINE

## 2024-06-10 PROCEDURE — 85025 COMPLETE CBC W/AUTO DIFF WBC: CPT

## 2024-06-10 PROCEDURE — 96372 THER/PROPH/DIAG INJ SC/IM: CPT

## 2024-06-10 RX ORDER — SODIUM CHLORIDE 9 MG/ML
INJECTION, SOLUTION INTRAVENOUS CONTINUOUS
OUTPATIENT
Start: 2024-06-17

## 2024-06-10 RX ORDER — ALBUTEROL SULFATE 90 UG/1
4 AEROSOL, METERED RESPIRATORY (INHALATION) PRN
OUTPATIENT
Start: 2024-06-17

## 2024-06-10 RX ORDER — DIPHENHYDRAMINE HYDROCHLORIDE 50 MG/ML
50 INJECTION INTRAMUSCULAR; INTRAVENOUS
OUTPATIENT
Start: 2024-06-17

## 2024-06-10 RX ORDER — EPINEPHRINE 1 MG/ML
0.3 INJECTION, SOLUTION INTRAMUSCULAR; SUBCUTANEOUS PRN
OUTPATIENT
Start: 2024-06-17

## 2024-06-10 RX ORDER — ONDANSETRON 2 MG/ML
8 INJECTION INTRAMUSCULAR; INTRAVENOUS
OUTPATIENT
Start: 2024-06-17

## 2024-06-10 RX ORDER — ACETAMINOPHEN 325 MG/1
650 TABLET ORAL
OUTPATIENT
Start: 2024-06-17

## 2024-06-10 RX ADMIN — ROMIPLOSTIM 80 MCG: 125 INJECTION, POWDER, LYOPHILIZED, FOR SOLUTION SUBCUTANEOUS at 11:30

## 2024-06-10 NOTE — PROGRESS NOTES
[facility] Visit Notes                 Date: Desiree 10, 2024  Name: Aixa Barkley  MRN: [id number]       : 1990    Pt arrived ambulatory and in no acute distress to [facility] for Labs + Romiplostim Injection  Denies fever, cough, and N/V- denies covid-like symptoms.     Labs ordered/ drawn through Left AC. Labs within treatment parameters. See Epic Results Review for details.   Nplate injection given in Left Arm SQ. Tolerated procedure well without issue.   Patient aware of upcoming appointment. Patient declined post- monitoring time. Education provided.     Ms. Billy Barkley's vitals were reviewed prior to treatment.   Patient Vitals for the past 12 hrs:   Temp Pulse Resp BP   06/10/24 0830 97.8 °F (36.6 °C) 76 18 (!) 112/58     Medications Administered         romiPLOStim (NPLATE) injection 80 mcg Admin Date  06/10/2024 Action  Given Dose  80 mcg Route  SubCUTAneous Administered By  Christel Chauhan RN Sarah N Hoang, RN  Desiree 10, 2024

## 2024-06-10 NOTE — PROGRESS NOTES
[facility]  Medical Oncology   [phone]    Hematology / Oncology Follow-up           Reason for Visit:   Aixa Barkley 33 y.o. female  /   Black /      who is seen by synchronous (real-time) audio-video technology for follow up of    Diagnosis Orders   1. Chronic ITP (idiopathic thrombocytopenia) (HCC)        2. Encounter for antineoplastic chemotherapy              Reason for Visit:   Aixa Barkley is a 33 y.o. female who is seen for follow-up of ITP and anemia.    Hematology Treatment History:    1/2024: presented with platelets 6k. Received dexamethasone (1/27 - 1/30/24) and IVIG x 2 doses.   4/12/2024: platelets 4k. Treated with dexamethasone (4/12 - 4/15/24) and IVIG x 2 doses  4/29/24: platelets 22k. Started prednisone 1 mg/kg (80 mg) with plan for taper. Repeat platelets on 5/3 with platelets 11k. Switched to dexamethasone 40 mg x 4 days (5/6 - 5/9/24)    Assessment/Plan:   #) Chronic relapsed/Refractory ITP:  Treatment history as above.   Steroid and IVIG responsive but quick relapses on taper.    Most recently was on pulse dex 5/9 but platelets dropped from 142k on 5/9 to 22k on 5/16 soon after stopping dex.  Resumed this 5/17  IVIG today- cbc pending and will follow   Plan to initiate promacta   Discussed the risk of liver function abnormalities, increased risk of thrombosis,loss of response, very low risk of bone marrow reticulin deposition.     Per Dr. Pop She is not a candidate for Rituximab given prior hepatitis B infection.     Promacta is hard to obtain due access issues.   Start Romiplastim weekly.     I educated her about the potential side effects of the treatment and ways to manage it.   Blood counts are acceptable. Results reviewed with the patient.    Chemotherapy administration is associated with myriad of side effects and would be Considered high risk medication.        #) Iron deficiency anemia:  Due to menorrhagia  -

## 2024-06-17 ENCOUNTER — TELEPHONE (OUTPATIENT)
Age: 34
End: 2024-06-17

## 2024-06-17 NOTE — TELEPHONE ENCOUNTER
Patient is here for a BP check today.  States she is not currently taking any BP meds daily.  Initial BP is 142/92, pulse is 86.  Recheck after 10 minutes is 138/92.  Advised to return to the clinic in 2 weeks.  Patient had a virtual visit with provider scheduled for 2/15/2021, changed to in person to address med check and HTN.  Sent to provider.    [facility] Extended Vitals - Weight in Kg/Lb 1/18/2021 1/18/2021 2/1/2021   /98 148/100 142/92   Pulse 82  86     [facility] Extended Vitals - Weight in Kg/Lb 2/1/2021   /92   Pulse         Pt's son [place] called requesting f/u on mother's medication status. Call back number is [phone]

## 2024-06-18 NOTE — TELEPHONE ENCOUNTER
Returned call to patients son. Left VM advising that the plan is for infusions (NPLATE) and not the oral pill (promacta). Patient missed her [facility] yesterday, but her next appt is 6/24 at 9 am. Return call to clinic with any further questions.

## 2024-06-19 NOTE — TELEPHONE ENCOUNTER
Verified patient ID x 2    Spoke with patient regarding the plan. She thought her infusion was tomorrow, didn't realize she missed her appointment 2 days ago.     She stated it is very hard for her to come get infusions every week due to  needs. If possible, she would like to start the promacta instead if we are able to obtain it. Patient is okay with getting weekly infusions in the mean time while we work out getting the oral pill.     Advised that we would look at [facility] availability tomorrow and return call.

## 2024-06-20 ENCOUNTER — APPOINTMENT (OUTPATIENT)
Facility: HOSPITAL | Age: 34
End: 2024-06-20
Payer: MEDICAID

## 2024-06-20 ENCOUNTER — HOSPITAL ENCOUNTER (OUTPATIENT)
Facility: HOSPITAL | Age: 34
Setting detail: INFUSION SERIES
Discharge: HOME | End: 2024-06-20
Payer: MEDICAID

## 2024-06-20 VITALS
BODY MASS INDEX: 40.38 KG/M2 | DIASTOLIC BLOOD PRESSURE: 71 MMHG | TEMPERATURE: 98.1 F | RESPIRATION RATE: 17 BRPM | WEIGHT: 180.12 LBS | OXYGEN SATURATION: 99 % | HEART RATE: 82 BPM | SYSTOLIC BLOOD PRESSURE: 113 MMHG

## 2024-06-20 DIAGNOSIS — D69.3 ACUTE IDIOPATHIC THROMBOCYTOPENIC PURPURA (HCC): Primary | ICD-10-CM

## 2024-06-20 LAB
BASOPHILS # BLD: 0.1 K/UL (ref 0–0.1)
BASOPHILS NFR BLD: 1 % (ref 0–1)
DIFFERENTIAL METHOD BLD: ABNORMAL
EOSINOPHIL # BLD: 0.1 K/UL (ref 0–0.4)
EOSINOPHIL NFR BLD: 1 % (ref 0–7)
ERYTHROCYTE [DISTWIDTH] IN BLOOD BY AUTOMATED COUNT: 17.8 % (ref 11.5–14.5)
HCT VFR BLD AUTO: 37.1 % (ref 35–47)
HGB BLD-MCNC: 12 G/DL (ref 11.5–16)
IMM GRANULOCYTES # BLD AUTO: 0.1 K/UL (ref 0–0.04)
IMM GRANULOCYTES NFR BLD AUTO: 1 % (ref 0–0.5)
LYMPHOCYTES # BLD: 2.9 K/UL (ref 0.8–3.5)
LYMPHOCYTES NFR BLD: 36 % (ref 12–49)
MCH RBC QN AUTO: 26.3 PG (ref 26–34)
MCHC RBC AUTO-ENTMCNC: 32.3 G/DL (ref 30–36.5)
MCV RBC AUTO: 81.4 FL (ref 80–99)
MONOCYTES # BLD: 0.6 K/UL (ref 0–1)
MONOCYTES NFR BLD: 7 % (ref 5–13)
NEUTS SEG # BLD: 4.3 K/UL (ref 1.8–8)
NEUTS SEG NFR BLD: 54 % (ref 32–75)
NRBC # BLD: 0 K/UL (ref 0–0.01)
NRBC BLD-RTO: 0 PER 100 WBC
PLATELET # BLD AUTO: 33 K/UL (ref 150–400)
RBC # BLD AUTO: 4.56 M/UL (ref 3.8–5.2)
RBC MORPH BLD: ABNORMAL
RBC MORPH BLD: ABNORMAL
WBC # BLD AUTO: 8.1 K/UL (ref 3.6–11)

## 2024-06-20 PROCEDURE — 6360000002 HC RX W HCPCS: Performed by: INTERNAL MEDICINE

## 2024-06-20 PROCEDURE — 85025 COMPLETE CBC W/AUTO DIFF WBC: CPT

## 2024-06-20 PROCEDURE — 36415 COLL VENOUS BLD VENIPUNCTURE: CPT

## 2024-06-20 PROCEDURE — 96372 THER/PROPH/DIAG INJ SC/IM: CPT

## 2024-06-20 RX ORDER — EPINEPHRINE 1 MG/ML
0.3 INJECTION, SOLUTION INTRAMUSCULAR; SUBCUTANEOUS PRN
Status: CANCELLED | OUTPATIENT
Start: 2024-06-27

## 2024-06-20 RX ORDER — ALBUTEROL SULFATE 90 UG/1
4 AEROSOL, METERED RESPIRATORY (INHALATION) PRN
Status: CANCELLED | OUTPATIENT
Start: 2024-06-27

## 2024-06-20 RX ORDER — ONDANSETRON 2 MG/ML
8 INJECTION INTRAMUSCULAR; INTRAVENOUS
Status: CANCELLED | OUTPATIENT
Start: 2024-06-27

## 2024-06-20 RX ORDER — DIPHENHYDRAMINE HYDROCHLORIDE 50 MG/ML
50 INJECTION INTRAMUSCULAR; INTRAVENOUS
Status: CANCELLED | OUTPATIENT
Start: 2024-06-27

## 2024-06-20 RX ORDER — ACETAMINOPHEN 325 MG/1
650 TABLET ORAL
Status: CANCELLED | OUTPATIENT
Start: 2024-06-27

## 2024-06-20 RX ORDER — SODIUM CHLORIDE 9 MG/ML
INJECTION, SOLUTION INTRAVENOUS CONTINUOUS
Status: CANCELLED | OUTPATIENT
Start: 2024-06-27

## 2024-06-20 RX ADMIN — ROMIPLOSTIM 165 MCG: 250 INJECTION, POWDER, LYOPHILIZED, FOR SOLUTION SUBCUTANEOUS at 16:52

## 2024-06-20 ASSESSMENT — PAIN SCALES - GENERAL: PAINLEVEL_OUTOF10: 0

## 2024-06-20 NOTE — PROGRESS NOTES
[facility] Progress Note    Date: 2024    Name: Aixa Barkley    MRN: [id number]         : 1990    Ms. Billy Barkley Arrived ambulatory and in no distress for labs/NPLATE.      No acute concerns at this time. Labs drawn peripherally from the right AC.     Ms. Billy Barkley's vital signs for this visit.  Vitals:    24 1448   BP: 113/71   Pulse: 82   Resp: 17   Temp: 98.1 °F (36.7 °C)   SpO2: 99%          Lab results were obtained and reviewed. Criteria Met for Injection.   Recent Results (from the past 12 hour(s))   CBC With Auto Differential    Collection Time: 24  2:51 PM   Result Value Ref Range    WBC 8.1 3.6 - 11.0 K/uL    RBC 4.56 3.80 - 5.20 M/uL    Hemoglobin 12.0 11.5 - 16.0 g/dL    Hematocrit 37.1 35.0 - 47.0 %    MCV 81.4 80.0 - 99.0 FL    MCH 26.3 26.0 - 34.0 PG    MCHC 32.3 30.0 - 36.5 g/dL    RDW 17.8 (H) 11.5 - 14.5 %    Platelets 33 (LL) 150 - 400 K/uL    Nucleated RBCs 0.0 0  WBC    nRBC 0.00 0.00 - 0.01 K/uL    Neutrophils % 54 32 - 75 %    Lymphocytes % 36 12 - 49 %    Monocytes % 7 5 - 13 %    Eosinophils % 1 0 - 7 %    Basophils % 1 0 - 1 %    Immature Granulocytes % 1 (H) 0.0 - 0.5 %    Neutrophils Absolute 4.3 1.8 - 8.0 K/UL    Lymphocytes Absolute 2.9 0.8 - 3.5 K/UL    Monocytes Absolute 0.6 0.0 - 1.0 K/UL    Eosinophils Absolute 0.1 0.0 - 0.4 K/UL    Basophils Absolute 0.1 0.0 - 0.1 K/UL    Immature Granulocytes Absolute 0.1 (H) 0.00 - 0.04 K/UL    Differential Type SMEAR SCANNED      RBC Comment TEARDROP CELLS  PRESENT        RBC Comment ANISOCYTOSIS  1+           Medications given:   Medications Administered         romiPLOStim (NPLATE) injection 165 mcg Admin Date  2024 Action  Given Dose  165 mcg Route  SubCUTAneous Administered By  Delma Ballard RN        Given SC in the right arm    Ms. Billy Barkley tolerated the injection, and had no complaints.    [facility]. Billy Bark[facility].

## 2024-06-21 ENCOUNTER — TELEPHONE (OUTPATIENT)
Age: 34
End: 2024-06-21

## 2024-06-21 NOTE — TELEPHONE ENCOUNTER
Spouse called stated patient is busy on Monday and would like to reschedule appt.   C/B #[phone]

## 2024-06-24 ENCOUNTER — TELEPHONE (OUTPATIENT)
Age: 34
End: 2024-06-24

## 2024-06-24 ENCOUNTER — HOSPITAL ENCOUNTER (OUTPATIENT)
Facility: HOSPITAL | Age: 34
Setting detail: INFUSION SERIES
End: 2024-06-24

## 2024-06-24 NOTE — TELEPHONE ENCOUNTER
Phoned pt with assistance of  [employer] #[phone] to inquire if pt could do [facility] at 10am and OV at 10:30 with NP. Pt agreed.

## 2024-06-25 PROBLEM — Z79.899 HIGH RISK MEDICATION USE: Status: ACTIVE | Noted: 2024-06-25

## 2024-06-25 NOTE — PROGRESS NOTES
[facility]  Medical Oncology   [phone]    Hematology / Oncology Follow-up    Reason for Visit:   Aixa Barkley is a 33 y.o. female who is seen for follow-up of ITP and anemia.    Hematology Treatment History:    1/2024: presented with platelets 6k. Received dexamethasone (1/27 - 1/30/24) and IVIG x 2 doses.   4/12/2024: platelets 4k. Treated with dexamethasone (4/12 - 4/15/24) and IVIG x 2 doses  4/29/24: platelets 22k. Started prednisone 1 mg/kg (80 mg) with plan for taper. Repeat platelets on 5/3 with platelets 11k. Switched to dexamethasone 40 mg x 4 days (5/6 - 5/9/24)  5/16/24: plt 22k  5/23/24: IVIG x1 dose. Plan to proceed with promacta but could not obtain due to access issues.   6/7/24: plt 20k   6/10/24: initiated on weekly Romiplastim    Assessment/Plan:   #) Chronic relapsed/Refractory ITP:  Treatment history as above.   Steroid and IVIG responsive but quick relapses on taper.  Most recently was on pulse dex 5/9 but platelets dropped from 142k on 5/9 to 22k on 5/16 soon after stopping dex.  Resumed this 5/17  She is not a candidate for Rituximab given prior hepatitis B infection.   Plan was to initiate promacta but could not obtain medication due to access issues. Therefore, we switched to weekly Romiplastim 6/10/24. She is tolerating this week with no side effects; however, she would like to switch to Promacta due to compliance issues with Romiplastim. She has received free-drug approval for Promacta. She will call the pharmacy today to set up delivery. We will continue with Romiplastim until she receives the Promacta. Risks/benefits discussed.     - Weekly CBC until platelets stabilize   - Continue Romiplastim for now   - Plan to switch to Promacta once patient has received medication  - Counseled on alarm symptoms   - Return to clinic in 2 weeks with labs prior to visit    #) Iron deficiency anemia:  Due to menorrhagia  - Continue oral iron with vitamin C    Case

## 2024-06-27 ENCOUNTER — TELEPHONE (OUTPATIENT)
Age: 34
End: 2024-06-27

## 2024-06-27 ENCOUNTER — HOSPITAL ENCOUNTER (OUTPATIENT)
Facility: HOSPITAL | Age: 34
Setting detail: INFUSION SERIES
Discharge: HOME | End: 2024-06-27
Payer: MEDICAID

## 2024-06-27 ENCOUNTER — OFFICE VISIT (OUTPATIENT)
Age: 34
End: 2024-06-27
Payer: MEDICAID

## 2024-06-27 VITALS
DIASTOLIC BLOOD PRESSURE: 72 MMHG | TEMPERATURE: 97.9 F | BODY MASS INDEX: 41.03 KG/M2 | WEIGHT: 183 LBS | RESPIRATION RATE: 18 BRPM | SYSTOLIC BLOOD PRESSURE: 120 MMHG | OXYGEN SATURATION: 98 % | HEART RATE: 74 BPM

## 2024-06-27 VITALS
TEMPERATURE: 97.9 F | WEIGHT: 183 LBS | BODY MASS INDEX: 41.03 KG/M2 | RESPIRATION RATE: 18 BRPM | SYSTOLIC BLOOD PRESSURE: 120 MMHG | DIASTOLIC BLOOD PRESSURE: 72 MMHG | HEART RATE: 74 BPM

## 2024-06-27 DIAGNOSIS — D69.3 CHRONIC ITP (IDIOPATHIC THROMBOCYTOPENIA) (HCC): Primary | ICD-10-CM

## 2024-06-27 DIAGNOSIS — Z79.899 HIGH RISK MEDICATION USE: ICD-10-CM

## 2024-06-27 LAB
BASOPHILS # BLD: 0.1 K/UL (ref 0–0.1)
BASOPHILS NFR BLD: 1 % (ref 0–1)
DIFFERENTIAL METHOD BLD: ABNORMAL
EOSINOPHIL # BLD: 0.1 K/UL (ref 0–0.4)
EOSINOPHIL NFR BLD: 1 % (ref 0–7)
ERYTHROCYTE [DISTWIDTH] IN BLOOD BY AUTOMATED COUNT: 17.8 % (ref 11.5–14.5)
HCT VFR BLD AUTO: 30.5 % (ref 35–47)
HGB BLD-MCNC: 10.4 G/DL (ref 11.5–16)
IMM GRANULOCYTES # BLD AUTO: 0.1 K/UL (ref 0–0.04)
IMM GRANULOCYTES NFR BLD AUTO: 1 % (ref 0–0.5)
LYMPHOCYTES # BLD: 3.4 K/UL (ref 0.8–3.5)
LYMPHOCYTES NFR BLD: 32 % (ref 12–49)
MCH RBC QN AUTO: 27.3 PG (ref 26–34)
MCHC RBC AUTO-ENTMCNC: 34.1 G/DL (ref 30–36.5)
MCV RBC AUTO: 80.1 FL (ref 80–99)
MONOCYTES # BLD: 0.6 K/UL (ref 0–1)
MONOCYTES NFR BLD: 6 % (ref 5–13)
NEUTS SEG # BLD: 6.2 K/UL (ref 1.8–8)
NEUTS SEG NFR BLD: 59 % (ref 32–75)
NRBC # BLD: 0 K/UL (ref 0–0.01)
NRBC BLD-RTO: 0 PER 100 WBC
PLATELET # BLD AUTO: 48 K/UL (ref 150–400)
PMV BLD AUTO: 11.3 FL (ref 8.9–12.9)
RBC # BLD AUTO: 3.81 M/UL (ref 3.8–5.2)
RBC MORPH BLD: ABNORMAL
WBC # BLD AUTO: 10.5 K/UL (ref 3.6–11)

## 2024-06-27 PROCEDURE — 99213 OFFICE O/P EST LOW 20 MIN: CPT

## 2024-06-27 PROCEDURE — 36415 COLL VENOUS BLD VENIPUNCTURE: CPT

## 2024-06-27 PROCEDURE — 6360000002 HC RX W HCPCS: Performed by: INTERNAL MEDICINE

## 2024-06-27 PROCEDURE — 96372 THER/PROPH/DIAG INJ SC/IM: CPT

## 2024-06-27 PROCEDURE — 85025 COMPLETE CBC W/AUTO DIFF WBC: CPT

## 2024-06-27 RX ORDER — SODIUM CHLORIDE 9 MG/ML
INJECTION, SOLUTION INTRAVENOUS CONTINUOUS
OUTPATIENT
Start: 2024-07-04

## 2024-06-27 RX ORDER — EPINEPHRINE 1 MG/ML
0.3 INJECTION, SOLUTION INTRAMUSCULAR; SUBCUTANEOUS PRN
OUTPATIENT
Start: 2024-07-04

## 2024-06-27 RX ORDER — ACETAMINOPHEN 325 MG/1
650 TABLET ORAL
OUTPATIENT
Start: 2024-07-04

## 2024-06-27 RX ORDER — ALBUTEROL SULFATE 90 UG/1
4 AEROSOL, METERED RESPIRATORY (INHALATION) PRN
OUTPATIENT
Start: 2024-07-04

## 2024-06-27 RX ORDER — ONDANSETRON 2 MG/ML
8 INJECTION INTRAMUSCULAR; INTRAVENOUS
OUTPATIENT
Start: 2024-07-04

## 2024-06-27 RX ORDER — DIPHENHYDRAMINE HYDROCHLORIDE 50 MG/ML
50 INJECTION INTRAMUSCULAR; INTRAVENOUS
OUTPATIENT
Start: 2024-07-04

## 2024-06-27 RX ADMIN — ROMIPLOSTIM 165 MCG: 250 INJECTION, POWDER, LYOPHILIZED, FOR SOLUTION SUBCUTANEOUS at 11:48

## 2024-06-27 ASSESSMENT — PAIN SCALES - GENERAL: PAINLEVEL_OUTOF10: 0

## 2024-06-27 NOTE — PROGRESS NOTES
[facility] Progress Note    Date: June 27, 2024        1000: Pt arrived ambulatory to [facility] for Labs and Nplate in stable condition.  Assessment completed. Labs drawn peripherally from left AC and sent for processing.     Labs reviewed. Criteria for treatment was met.    Vitals:    06/27/24 1000   BP: 120/72   Pulse: 74   Resp: 18   Temp: 97.9 °F (36.6 °C)        Medications Administered         romiPLOStim (NPLATE) injection 165 mcg Admin Date  06/27/2024 Action  Given Dose  165 mcg Route  SubCUTAneous Administered By  Nichelle Knight, RN            Given left arm    Ms. Billy Barkley tolerated the treatment well, and had no complaints.    [facility]. Billy [facility]. Patient is aware of next scheduled [facility] appointment.    Future Appointments   Date Time Provider Department Center   7/3/2024 11:00 [facility]   7/11/2024 10:00 [facility]   7/11/2024 10:20 AM Prerna Mcfarland APRN - CNP [facility] AMB   7/15/2024  9:00 AM [facility] FASTTRACK 3 [facility]   7/18/2024 10:00 [facility]   7/22/2024  9:00 AM [facility] FASTTRACK 3 [facility]   7/25/2024 11:00 [facility]   7/29/2024  9:00 AM [facility]ER FASTTRACK 3 [facility]   8/1/2024 10:00 AM [facility]   8/8/2024 10:00 AM [facility]           Nichelle Knight RN, RN  June 27, 2024

## 2024-06-27 NOTE — PROGRESS NOTES
Aixa Barkley is a 33 y.o. female    Chief Complaint   Patient presents with    Follow-up     ITP and anemia       1. Have you been to the ER, urgent care clinic since your last visit?  Hospitalized since your last visit?No    2. Have you seen or consulted any other health care providers outside of the [facility] since your last visit?  Include any pap smears or colon screening. No

## 2024-06-27 NOTE — TELEPHONE ENCOUNTER
Pt's son Cleveland called requesting the name of the medication pt was suppose to be prescribed so they can see if the pharmacy will prescribe it. Call back number is [phone].

## 2024-06-28 NOTE — TELEPHONE ENCOUNTER
Left VM advising patient to call specialty pharmacy to fill the Promacta. Provided phone number. Advised to return call to clinic with any additional questions.

## 2024-07-01 ENCOUNTER — HOSPITAL ENCOUNTER (OUTPATIENT)
Facility: HOSPITAL | Age: 34
Setting detail: INFUSION SERIES
End: 2024-07-01

## 2024-07-02 ENCOUNTER — TELEPHONE (OUTPATIENT)
Age: 34
End: 2024-07-02

## 2024-07-02 NOTE — TELEPHONE ENCOUNTER
Verified patient ID x 2    Called patient to follow up on Promacta. Patient received VM with name of medication and pharmacy phone number. She tried to call but they did not have someone who spoke Malaysian. Her son will be home this afternoon who speaks english, and they are planning to call today together to get the medication filled.     Advised patient to please call clinic if she has any issues getting the medication filled. Patient verbalized understanding.     Reviewed [email or web address] appt time and date for tomorrow.     No further needs.

## 2024-07-03 ENCOUNTER — HOSPITAL ENCOUNTER (OUTPATIENT)
Facility: HOSPITAL | Age: 34
Setting detail: INFUSION SERIES
Discharge: HOME | End: 2024-07-03
Payer: MEDICAID

## 2024-07-03 ENCOUNTER — TELEPHONE (OUTPATIENT)
Age: 34
End: 2024-07-03

## 2024-07-03 VITALS
RESPIRATION RATE: 16 BRPM | DIASTOLIC BLOOD PRESSURE: 56 MMHG | SYSTOLIC BLOOD PRESSURE: 100 MMHG | TEMPERATURE: 97.4 F | HEART RATE: 83 BPM

## 2024-07-03 DIAGNOSIS — D69.3 CHRONIC ITP (IDIOPATHIC THROMBOCYTOPENIA) (HCC): Primary | ICD-10-CM

## 2024-07-03 LAB
BASOPHILS # BLD: 0.1 K/UL (ref 0–0.1)
BASOPHILS NFR BLD: 1 % (ref 0–1)
DIFFERENTIAL METHOD BLD: ABNORMAL
EOSINOPHIL # BLD: 0.2 K/UL (ref 0–0.4)
EOSINOPHIL NFR BLD: 2 % (ref 0–7)
ERYTHROCYTE [DISTWIDTH] IN BLOOD BY AUTOMATED COUNT: 18 % (ref 11.5–14.5)
HCT VFR BLD AUTO: 31.5 % (ref 35–47)
HGB BLD-MCNC: 10.8 G/DL (ref 11.5–16)
IMM GRANULOCYTES # BLD AUTO: 0.1 K/UL (ref 0–0.04)
IMM GRANULOCYTES NFR BLD AUTO: 1 % (ref 0–0.5)
LYMPHOCYTES # BLD: 3 K/UL (ref 0.8–3.5)
LYMPHOCYTES NFR BLD: 30 % (ref 12–49)
MCH RBC QN AUTO: 27.8 PG (ref 26–34)
MCHC RBC AUTO-ENTMCNC: 34.3 G/DL (ref 30–36.5)
MCV RBC AUTO: 81 FL (ref 80–99)
MONOCYTES # BLD: 0.5 K/UL (ref 0–1)
MONOCYTES NFR BLD: 5 % (ref 5–13)
NEUTS SEG # BLD: 6.1 K/UL (ref 1.8–8)
NEUTS SEG NFR BLD: 61 % (ref 32–75)
NRBC # BLD: 0 K/UL (ref 0–0.01)
NRBC BLD-RTO: 0 PER 100 WBC
PLATELET # BLD AUTO: 68 K/UL (ref 150–400)
RBC # BLD AUTO: 3.89 M/UL (ref 3.8–5.2)
RBC MORPH BLD: ABNORMAL
RBC MORPH BLD: ABNORMAL
WBC # BLD AUTO: 10 K/UL (ref 3.6–11)

## 2024-07-03 PROCEDURE — 36415 COLL VENOUS BLD VENIPUNCTURE: CPT

## 2024-07-03 PROCEDURE — 96372 THER/PROPH/DIAG INJ SC/IM: CPT

## 2024-07-03 PROCEDURE — 85025 COMPLETE CBC W/AUTO DIFF WBC: CPT

## 2024-07-03 PROCEDURE — 6360000002 HC RX W HCPCS: Performed by: INTERNAL MEDICINE

## 2024-07-03 RX ORDER — SODIUM CHLORIDE 9 MG/ML
INJECTION, SOLUTION INTRAVENOUS CONTINUOUS
OUTPATIENT
Start: 2024-07-04

## 2024-07-03 RX ORDER — ACETAMINOPHEN 325 MG/1
650 TABLET ORAL
OUTPATIENT
Start: 2024-07-04

## 2024-07-03 RX ORDER — DIPHENHYDRAMINE HYDROCHLORIDE 50 MG/ML
50 INJECTION INTRAMUSCULAR; INTRAVENOUS
OUTPATIENT
Start: 2024-07-04

## 2024-07-03 RX ORDER — ALBUTEROL SULFATE 90 UG/1
4 AEROSOL, METERED RESPIRATORY (INHALATION) PRN
OUTPATIENT
Start: 2024-07-04

## 2024-07-03 RX ORDER — EPINEPHRINE 1 MG/ML
0.3 INJECTION, SOLUTION INTRAMUSCULAR; SUBCUTANEOUS PRN
OUTPATIENT
Start: 2024-07-04

## 2024-07-03 RX ORDER — ONDANSETRON 2 MG/ML
8 INJECTION INTRAMUSCULAR; INTRAVENOUS
OUTPATIENT
Start: 2024-07-04

## 2024-07-03 RX ADMIN — ROMIPLOSTIM 165 MCG: 125 INJECTION, POWDER, LYOPHILIZED, FOR SOLUTION SUBCUTANEOUS at 13:00

## 2024-07-03 NOTE — TELEPHONE ENCOUNTER
Pt's son Cleveland called to report pt has received her medication. Call back number is [phone].

## 2024-07-03 NOTE — TELEPHONE ENCOUNTER
Returned call. No answer.    Left detailed message. Patient can start taking promacta tomorrow per NP and pharmacist. Take one tablet daily. We will recheck labs on 7/11 and have an office visit that day to discuss further.

## 2024-07-03 NOTE — PROGRESS NOTES
[facility] Progress Note    1100 Pt admit to [facility] for labs/Nplate ambulatory in stable condition. Assessment completed. No new concerns voiced. Labs drawn peripherally and sent for processing.      BP (!) 100/56   Pulse 83   Temp 97.4 °F (36.3 °C) (Temporal)   Resp 16     Medications Administered         romiPLOStim (NPLATE) injection 165 mcg Admin Date  07/03/2024 Action  Given Dose  165 mcg Route  SubCUTAneous Administered By  Reny Linn RN          (SC R arm)         1300 Pt tolerated treatment well.  D/c home ambulatory in no distress. Pt aware of next [facility] appointment scheduled for 07/11/2024.

## 2024-07-11 ENCOUNTER — HOSPITAL ENCOUNTER (OUTPATIENT)
Facility: HOSPITAL | Age: 34
Setting detail: INFUSION SERIES
Discharge: HOME | End: 2024-07-11
Payer: MEDICAID

## 2024-07-11 ENCOUNTER — OFFICE VISIT (OUTPATIENT)
Age: 34
End: 2024-07-11
Payer: MEDICAID

## 2024-07-11 VITALS
HEIGHT: 57 IN | DIASTOLIC BLOOD PRESSURE: 72 MMHG | OXYGEN SATURATION: 97 % | TEMPERATURE: 98.4 F | WEIGHT: 182.6 LBS | SYSTOLIC BLOOD PRESSURE: 113 MMHG | RESPIRATION RATE: 16 BRPM | HEART RATE: 73 BPM | BODY MASS INDEX: 39.4 KG/M2

## 2024-07-11 DIAGNOSIS — D50.0 IRON DEFICIENCY ANEMIA DUE TO CHRONIC BLOOD LOSS: ICD-10-CM

## 2024-07-11 DIAGNOSIS — D69.3 CHRONIC ITP (IDIOPATHIC THROMBOCYTOPENIA) (HCC): ICD-10-CM

## 2024-07-11 DIAGNOSIS — D69.3 CHRONIC ITP (IDIOPATHIC THROMBOCYTOPENIA) (HCC): Primary | ICD-10-CM

## 2024-07-11 LAB
BASOPHILS # BLD: 0.1 K/UL (ref 0–0.1)
BASOPHILS NFR BLD: 1 % (ref 0–1)
DIFFERENTIAL METHOD BLD: ABNORMAL
EOSINOPHIL # BLD: 0.2 K/UL (ref 0–0.4)
EOSINOPHIL NFR BLD: 2 % (ref 0–7)
ERYTHROCYTE [DISTWIDTH] IN BLOOD BY AUTOMATED COUNT: 16.9 % (ref 11.5–14.5)
HCT VFR BLD AUTO: 34.3 % (ref 35–47)
HGB BLD-MCNC: 11.6 G/DL (ref 11.5–16)
IMM GRANULOCYTES # BLD AUTO: 0.1 K/UL (ref 0–0.04)
IMM GRANULOCYTES NFR BLD AUTO: 1 % (ref 0–0.5)
LYMPHOCYTES # BLD: 3 K/UL (ref 0.8–3.5)
LYMPHOCYTES NFR BLD: 30 % (ref 12–49)
MCH RBC QN AUTO: 27.6 PG (ref 26–34)
MCHC RBC AUTO-ENTMCNC: 33.8 G/DL (ref 30–36.5)
MCV RBC AUTO: 81.5 FL (ref 80–99)
MONOCYTES # BLD: 0.5 K/UL (ref 0–1)
MONOCYTES NFR BLD: 5 % (ref 5–13)
NEUTS SEG # BLD: 6.1 K/UL (ref 1.8–8)
NEUTS SEG NFR BLD: 61 % (ref 32–75)
NRBC # BLD: 0 K/UL (ref 0–0.01)
NRBC BLD-RTO: 0 PER 100 WBC
PLATELET # BLD AUTO: 94 K/UL (ref 150–400)
RBC # BLD AUTO: 4.21 M/UL (ref 3.8–5.2)
RBC MORPH BLD: ABNORMAL
WBC # BLD AUTO: 10 K/UL (ref 3.6–11)

## 2024-07-11 PROCEDURE — 36415 COLL VENOUS BLD VENIPUNCTURE: CPT

## 2024-07-11 PROCEDURE — 99213 OFFICE O/P EST LOW 20 MIN: CPT

## 2024-07-11 PROCEDURE — 85025 COMPLETE CBC W/AUTO DIFF WBC: CPT

## 2024-07-11 RX ORDER — ELTROMBOPAG OLAMINE 50 MG/1
50 TABLET, FILM COATED ORAL DAILY
Qty: 30 TABLET | Refills: 0 | Status: ACTIVE | OUTPATIENT
Start: 2024-07-11

## 2024-07-11 ASSESSMENT — PATIENT HEALTH QUESTIONNAIRE - PHQ9
SUM OF ALL RESPONSES TO PHQ QUESTIONS 1-9: 0
2. FEELING DOWN, DEPRESSED OR HOPELESS: NOT AT ALL
SUM OF ALL RESPONSES TO PHQ QUESTIONS 1-9: 0
SUM OF ALL RESPONSES TO PHQ9 QUESTIONS 1 & 2: 0
1. LITTLE INTEREST OR PLEASURE IN DOING THINGS: NOT AT ALL
SUM OF ALL RESPONSES TO PHQ QUESTIONS 1-9: 0
SUM OF ALL RESPONSES TO PHQ QUESTIONS 1-9: 0

## 2024-07-11 NOTE — PROGRESS NOTES
Patient identified with two identification factors, Name and Date of Birth.    Chief Complaint   Patient presents with    Chemotherapy    Follow-up     Chronic ITP (idiopathic thrombocytopenia)       /72 (Site: Left Upper Arm, Position: Sitting, Cuff Size: Large Adult)   Pulse 73   Temp 98.4 °F (36.9 °C) (Temporal)   Resp 16   Ht 1.448 m (4' 9\")   Wt 82.8 kg (182 lb 9.6 oz)   LMP 06/24/2024 (Approximate)   SpO2 97%   BMI 39.51 kg/m²       1. \"Have you been to the ER, urgent care clinic since your last visit?  Hospitalized since your last visit?\" No     2. \"Have you seen or consulted any other health care providers outside of the [facility] since your last visit?\" No     
  Chart reviewed, interval events since last visit noted.     History obtained via .    Presents today for follow up.  # [id number].  Had response to nplate (plt 20k --> 68k) but switched to Promacta 7/7 due to patient preference.   She is tolerating this well with no side effects thus far. She understands she needs to remain on this daily.   Platelets today 94 .   She denies bleeding concerns.   She requests a refill of the Promacta. She received a 2-week supply of the Promacta.   She does not have any concerns today.     Medications reviewed in the EMR.  No Known Allergies     Review of Systems: A 6-point review of systems was obtained, negative except as reviewed in the HPI.    Physical Exam:     /72 (Site: Left Upper Arm, Position: Sitting, Cuff Size: Large Adult)   Pulse 73   Temp 98.4 °F (36.9 °C) (Temporal)   Resp 16   Ht 1.448 m (4' 9\")   Wt 82.8 kg (182 lb 9.6 oz)   LMP 06/24/2024 (Approximate)   SpO2 97%   BMI 39.51 kg/m²     General: No distress  Eyes: Anicteric sclerae  HENT: Atraumatic  Neck: Supple  Respiratory: CTAB, normal respiratory effort, no wheezes  CV: RRR, no murmurs, No peripheral edema  GI: Soft, nontender, nondistended, no masses, no hepatomegaly  Skin: No rashes, ecchymoses, or petechiae  Psych: Alert, oriented, appropriate affect, normal judgment/insight    Results:     Lab Results   Component Value Date/Time    WBC 10.0 07/11/2024 10:20 AM    HGB 11.6 07/11/2024 10:20 AM    HCT 34.3 07/11/2024 10:20 AM    PLT 94 07/11/2024 10:20 AM    MCV 81.5 07/11/2024 10:20 AM     Lab Results   Component Value Date/Time     04/13/2024 04:03 AM    K 3.7 04/13/2024 04:03 AM     04/13/2024 04:03 AM    CO2 22 04/13/2024 04:03 AM    BUN 8 04/13/2024 04:03 AM     Lab Results   Component Value Date/Time    ALT 27 04/13/2024 04:03 AM    GLOB 4.9 04/13/2024 04:03 AM

## 2024-07-11 NOTE — PROGRESS NOTES
Pt here for lab draw. Labs drawn from left ac without difficulty and sent for processing. Pt went upstairs for her doctors appointment.

## 2024-07-15 ENCOUNTER — HOSPITAL ENCOUNTER (OUTPATIENT)
Facility: HOSPITAL | Age: 34
Setting detail: INFUSION SERIES
End: 2024-07-15

## 2024-07-16 ENCOUNTER — TELEPHONE (OUTPATIENT)
Age: 34
End: 2024-07-16

## 2024-07-16 NOTE — TELEPHONE ENCOUNTER
Verified patient ID x 2    Spoke with [employer]. They said 30 tablets of Promacta were sent out the first round, so they are unsure why patient only received 2 weeks worth. Rep said he will send out the 7/11 script for 30 tabs today so that patient does not run out.     Let patient know that we spoke with pharmacy and they are sending new bottle with 30 tabs.

## 2024-07-18 ENCOUNTER — HOSPITAL ENCOUNTER (OUTPATIENT)
Facility: HOSPITAL | Age: 34
Setting detail: INFUSION SERIES
Discharge: HOME | End: 2024-07-18
Payer: MEDICAID

## 2024-07-18 DIAGNOSIS — D69.3 CHRONIC ITP (IDIOPATHIC THROMBOCYTOPENIA) (HCC): ICD-10-CM

## 2024-07-18 LAB
BASOPHILS # BLD: 0.1 K/UL (ref 0–0.1)
BASOPHILS NFR BLD: 1 % (ref 0–1)
DIFFERENTIAL METHOD BLD: ABNORMAL
EOSINOPHIL # BLD: 0.3 K/UL (ref 0–0.4)
EOSINOPHIL NFR BLD: 3 % (ref 0–7)
ERYTHROCYTE [DISTWIDTH] IN BLOOD BY AUTOMATED COUNT: 16.2 % (ref 11.5–14.5)
HCT VFR BLD AUTO: 34.9 % (ref 35–47)
HGB BLD-MCNC: 12 G/DL (ref 11.5–16)
IMM GRANULOCYTES # BLD AUTO: 0 K/UL (ref 0–0.04)
IMM GRANULOCYTES NFR BLD AUTO: 0 % (ref 0–0.5)
LYMPHOCYTES # BLD: 3.3 K/UL (ref 0.8–3.5)
LYMPHOCYTES NFR BLD: 32 % (ref 12–49)
MCH RBC QN AUTO: 27.8 PG (ref 26–34)
MCHC RBC AUTO-ENTMCNC: 34.4 G/DL (ref 30–36.5)
MCV RBC AUTO: 80.8 FL (ref 80–99)
MONOCYTES # BLD: 0.6 K/UL (ref 0–1)
MONOCYTES NFR BLD: 6 % (ref 5–13)
NEUTS SEG # BLD: 6 K/UL (ref 1.8–8)
NEUTS SEG NFR BLD: 58 % (ref 32–75)
NRBC # BLD: 0 K/UL (ref 0–0.01)
NRBC BLD-RTO: 0 PER 100 WBC
PLATELET # BLD AUTO: 79 K/UL (ref 150–400)
RBC # BLD AUTO: 4.32 M/UL (ref 3.8–5.2)
RBC MORPH BLD: ABNORMAL
RBC MORPH BLD: ABNORMAL
WBC # BLD AUTO: 10.3 K/UL (ref 3.6–11)

## 2024-07-18 PROCEDURE — 85025 COMPLETE CBC W/AUTO DIFF WBC: CPT

## 2024-07-18 PROCEDURE — 36415 COLL VENOUS BLD VENIPUNCTURE: CPT

## 2024-07-18 NOTE — PROGRESS NOTES
[facility] Progress Note    Date: 2024    Name: Aixa Barkley    MRN: [id number]         : 1990      Pt arrived ambulatory and in no distress, for lab visit.  Labs drawn via left AC, patient tolerated well.        Please check Epic for Results.     Departed [facility] ambulatory and in no distress.    Future Appointments   Date Time Provider Department Center   2024 11:00 AM [facility] LAB CHAIR 1 [facility]   2024 11:40 AM Prerna Mcfarland, APRN - CNP [facility]   2024 10:00 AM [facility] LAB CHAIR 1 [facility]   2024 10:00 AM [facility] LAB CHAIR 1 [facility]   8/15/2024 10:00 AM [facility] LAB CHAIR 1 [facility]   2024 11:00 AM [facility] LAB CHAIR 1 [facility]   2024 10:00 AM [facility] LAB CHAIR 1 [facility]   2024  1:30 PM [facility] LAB CHAIR 1 [facility]   2024  9:00 AM [facility] LAB CHAIR 1 [facility]       Afshan Butler RN  2024

## 2024-07-19 ENCOUNTER — TELEPHONE (OUTPATIENT)
Age: 34
End: 2024-07-19

## 2024-07-19 NOTE — TELEPHONE ENCOUNTER
----- Message from KODAK Fuentes - CNP sent at 7/19/2024  8:23 AM EDT -----  Please let patient know her platelets are stable at 79,  Please make sure she is taking Promacta daily.   Did she receive refill?  ----- Message -----  From: Yasmany Thayer Incoming [facility] For [facility] Pdfs  Sent: 7/18/2024  11:21 AM EDT  To: Sabina Pop MD

## 2024-07-19 NOTE — TELEPHONE ENCOUNTER
Verified patient ID x 2    Let patient know her platelets are stable at 79.    Made sure patient is taking Promacta daily as prescribed.      Patient received refill of the promacta.     Reviewed next [facility] and OV on 7/25 at 11 am and 11:40 am.    No further needs.

## 2024-07-20 NOTE — PROGRESS NOTES
[facility]  Medical Oncology   [phone]    Hematology / Oncology Follow-up    Reason for Visit:   Aixa Barkley is a 33 y.o. female who is seen for follow-up of ITP and anemia.    Hematology Treatment History:    1/2024: presented with platelets 6k. Received dexamethasone (1/27 - 1/30/24) and IVIG x 2 doses.   4/12/2024: platelets 4k. Treated with dexamethasone (4/12 - 4/15/24) and IVIG x 2 doses  4/29/24: platelets 22k. Started prednisone 1 mg/kg (80 mg) with plan for taper. Repeat platelets on 5/3 with platelets 11k. Switched to dexamethasone 40 mg x 4 days (5/6 - 5/9/24)  5/16/24: plt 22k  5/23/24: IVIG x1 dose. Plan to proceed with promacta but could not obtain due to access issues.   6/7/24: plt 20k   6/10/24: initiated on weekly Romiplastim    Assessment/Plan:   #) Chronic relapsed/Refractory ITP:  Treatment history as above.   Steroid and IVIG responsive but quick relapses on taper.  Most recently was on pulse dex 5/9 but platelets dropped from 142k on 5/9 to 22k on 5/16 soon after stopping dex.  Resumed this 5/17  She is not a candidate for Rituximab given prior hepatitis B infection.   Plan was to initiate promacta but could not obtain medication due to access issues. Therefore, we switched to weekly Romiplastim 6/10/24. She had response to nplate (plt 20k --> 68k) but switched to Promacta 7/7 due to patient preference. She has now received free-drug approval for Promacta.    She presents today for follow up. She remains on Promacta 50 mg daily. She is tolerating this well with no side effects. Platelets today 49k. She is compliant with medication and is taking as instructed. She denies missed doses.     - Increase Promacta to 75 mg daily   - Continue weekly CBC   - Counseled on alarm symptoms   - Return to clinic in 2 weeks with labs prior to visit    #) Iron deficiency anemia:  Due to menorrhagia  - Continue oral iron with vitamin C    Case discussed with Dr. Bush.

## 2024-07-22 ENCOUNTER — APPOINTMENT (OUTPATIENT)
Facility: HOSPITAL | Age: 34
End: 2024-07-22
Payer: MEDICAID

## 2024-07-25 ENCOUNTER — OFFICE VISIT (OUTPATIENT)
Age: 34
End: 2024-07-25
Payer: MEDICAID

## 2024-07-25 ENCOUNTER — HOSPITAL ENCOUNTER (OUTPATIENT)
Facility: HOSPITAL | Age: 34
Setting detail: INFUSION SERIES
Discharge: HOME | End: 2024-07-25
Payer: MEDICAID

## 2024-07-25 VITALS
SYSTOLIC BLOOD PRESSURE: 117 MMHG | DIASTOLIC BLOOD PRESSURE: 52 MMHG | RESPIRATION RATE: 16 BRPM | HEART RATE: 75 BPM | TEMPERATURE: 97.4 F

## 2024-07-25 VITALS
SYSTOLIC BLOOD PRESSURE: 106 MMHG | RESPIRATION RATE: 16 BRPM | HEART RATE: 71 BPM | TEMPERATURE: 97.4 F | WEIGHT: 183 LBS | OXYGEN SATURATION: 95 % | BODY MASS INDEX: 39.6 KG/M2 | DIASTOLIC BLOOD PRESSURE: 70 MMHG

## 2024-07-25 DIAGNOSIS — Z79.899 HIGH RISK MEDICATION USE: ICD-10-CM

## 2024-07-25 DIAGNOSIS — D69.3 CHRONIC ITP (IDIOPATHIC THROMBOCYTOPENIA) (HCC): Primary | ICD-10-CM

## 2024-07-25 LAB
BASOPHILS # BLD: 0.1 K/UL (ref 0–0.1)
BASOPHILS NFR BLD: 1 % (ref 0–1)
DIFFERENTIAL METHOD BLD: ABNORMAL
EOSINOPHIL # BLD: 0.3 K/UL (ref 0–0.4)
EOSINOPHIL NFR BLD: 3 % (ref 0–7)
ERYTHROCYTE [DISTWIDTH] IN BLOOD BY AUTOMATED COUNT: 15.3 % (ref 11.5–14.5)
HCT VFR BLD AUTO: 34.4 % (ref 35–47)
HGB BLD-MCNC: 11.2 G/DL (ref 11.5–16)
IMM GRANULOCYTES # BLD AUTO: 0 K/UL (ref 0–0.04)
IMM GRANULOCYTES NFR BLD AUTO: 0 % (ref 0–0.5)
LYMPHOCYTES # BLD: 3.2 K/UL (ref 0.8–3.5)
LYMPHOCYTES NFR BLD: 38 % (ref 12–49)
MCH RBC QN AUTO: 26.7 PG (ref 26–34)
MCHC RBC AUTO-ENTMCNC: 32.6 G/DL (ref 30–36.5)
MCV RBC AUTO: 82.1 FL (ref 80–99)
MONOCYTES # BLD: 0.4 K/UL (ref 0–1)
MONOCYTES NFR BLD: 5 % (ref 5–13)
NEUTS SEG # BLD: 4.3 K/UL (ref 1.8–8)
NEUTS SEG NFR BLD: 53 % (ref 32–75)
NRBC # BLD: 0 K/UL (ref 0–0.01)
NRBC BLD-RTO: 0 PER 100 WBC
PLATELET # BLD AUTO: 49 K/UL (ref 150–400)
RBC # BLD AUTO: 4.19 M/UL (ref 3.8–5.2)
RBC MORPH BLD: ABNORMAL
WBC # BLD AUTO: 8.3 K/UL (ref 3.6–11)

## 2024-07-25 PROCEDURE — 99213 OFFICE O/P EST LOW 20 MIN: CPT

## 2024-07-25 PROCEDURE — 85025 COMPLETE CBC W/AUTO DIFF WBC: CPT

## 2024-07-25 PROCEDURE — 36415 COLL VENOUS BLD VENIPUNCTURE: CPT

## 2024-07-25 NOTE — PROGRESS NOTES
Aixa Barkley is a 33 y.o. female    Chief Complaint   Patient presents with    Follow-up      ITP and anemia       1. Have you been to the ER, urgent care clinic since your last visit?  Hospitalized since your last visit?No    2. Have you seen or consulted any other health care providers outside of the [facility] since your last visit?  Include any pap smears or colon screening. No

## 2024-07-25 NOTE — PROGRESS NOTES
[facility]    Date: 2024    Name: Aixa Barkley    MRN: [id number]         : 1990      1100:  Pt arrived ambulatory and in no distress, for lab visit.  Labs drawn via left AC, patient tolerated well.        Vitals:    24 1100   BP: (!) 117/52   Pulse: 75   Resp: 16   Temp: 97.4 °F (36.3 °C)               Departed [facility] ambulatory and in no distress.    Future Appointments   Date Time Provider Department Center   2024 11:40 AM Prerna Mcfarland, APRN - CNP [facility] AMB   2024 10:00 AM [facility] LAB CHAIR 1 [facility]   2024 10:00 AM [facility]ER LAB CHAIR 1 [facility]   8/15/2024 10:00 AM HA[facility]ER LAB CHAIR 1 [facility]   2024 11:00 AM [facility] LAB CHAIR 1 [facility]   2024 10:00 AM [facility] LAB CHAIR 1 [facility]   2024  1:30 PM [facility] LAB CHAIR 1 [facility]   2024  9:00 AM [facility] LAB CHAIR 1 [facility]       Nichelle Knight RN  2024

## 2024-07-26 ENCOUNTER — TELEPHONE (OUTPATIENT)
Age: 34
End: 2024-07-26

## 2024-07-26 RX ORDER — ELTROMBOPAG OLAMINE 75 MG/1
75 TABLET, FILM COATED ORAL DAILY
Qty: 30 TABLET | Refills: 1 | Status: SHIPPED | OUTPATIENT
Start: 2024-07-26

## 2024-07-26 NOTE — TELEPHONE ENCOUNTER
----- Message from KODAK Fuentes CNP sent at 7/26/2024  1:41 PM EDT -----  Please let patient know we are increasing her promacta to 75 mg.   I sent in a new prescription to [employer].  Continue promacta 50 mg daily until she obtains 75 mg.   Call with s/s of bleeding.   Recheck in 1 week.

## 2024-07-26 NOTE — TELEPHONE ENCOUNTER
No answer. Left detailed message. Platelets 49. We are increasing her promacta to 75 mg.   Marlys sent in a new prescription to [employer].  Continue promacta 50 mg daily until she obtains 75 mg.   Call with s/s of bleeding.   Recheck in 1 week on 8/1 at 10:00 am.    Advised patient I will call Monday to ensure she got this message.

## 2024-07-29 ENCOUNTER — APPOINTMENT (OUTPATIENT)
Facility: HOSPITAL | Age: 34
End: 2024-07-29
Payer: MEDICAID

## 2024-07-31 NOTE — PROGRESS NOTES
[facility]  Medical Oncology   [phone]    Hematology / Oncology Follow-up    Reason for Visit:   Aixa Barkley is a 33 y.o. female who is seen for follow-up of ITP and anemia.    Hematology Treatment History:    1/2024: presented with platelets 6k. Received dexamethasone (1/27 - 1/30/24) and IVIG x 2 doses.   4/12/2024: platelets 4k. Treated with dexamethasone (4/12 - 4/15/24) and IVIG x 2 doses  4/29/24: platelets 22k. Started prednisone 1 mg/kg (80 mg) with plan for taper. Repeat platelets on 5/3 with platelets 11k. Switched to dexamethasone 40 mg x 4 days (5/6 - 5/9/24)  5/16/24: plt 22k  5/23/24: IVIG x1 dose. Plan to proceed with promacta but could not obtain due to access issues.   6/7/24: plt 20k   6/10/24: initiated on weekly Romiplastim    Assessment/Plan:   #) Chronic relapsed/Refractory ITP:  Treatment history as above.   Steroid and IVIG responsive but quick relapses on taper.  Most recently was on pulse dex 5/9 but platelets dropped from 142k on 5/9 to 22k on 5/16 soon after stopping dex.  Resumed this 5/17  She is not a candidate for Rituximab given prior hepatitis B infection.   Plan was to initiate promacta but could not obtain medication due to access issues. Therefore, we switched to weekly Romiplastim 6/10/24. She had response to nplate (plt 20k --> 68k) but switched to Promacta 7/7 due to patient preference. She has now received free-drug approval for Promacta.    She presents today for follow up. She remains on Promacta 50 mg daily. She is tolerating this well with no side effects. We planned to increase Promacta to 75 mg in the interval but she did not receive the prescription. Platelets today 101. She feels well and has no concerns today.     - Continue Promacta to 50 mg daily   - Continue weekly CBC   - Counseled on alarm symptoms   - Return to clinic in 4 weeks with labs prior to visit    #) Iron deficiency anemia:  Due to menorrhagia. Stopped PO iron due to

## 2024-08-01 ENCOUNTER — TELEPHONE (OUTPATIENT)
Age: 34
End: 2024-08-01

## 2024-08-01 ENCOUNTER — HOSPITAL ENCOUNTER (OUTPATIENT)
Facility: HOSPITAL | Age: 34
Setting detail: INFUSION SERIES
Discharge: HOME | End: 2024-08-01
Payer: MEDICAID

## 2024-08-01 DIAGNOSIS — D69.3 CHRONIC ITP (IDIOPATHIC THROMBOCYTOPENIA) (HCC): ICD-10-CM

## 2024-08-01 LAB
BASOPHILS # BLD: 0.1 K/UL (ref 0–0.1)
BASOPHILS NFR BLD: 1 % (ref 0–1)
DIFFERENTIAL METHOD BLD: ABNORMAL
EOSINOPHIL # BLD: 0.3 K/UL (ref 0–0.4)
EOSINOPHIL NFR BLD: 3 % (ref 0–7)
ERYTHROCYTE [DISTWIDTH] IN BLOOD BY AUTOMATED COUNT: 14.9 % (ref 11.5–14.5)
HCT VFR BLD AUTO: 34.8 % (ref 35–47)
HGB BLD-MCNC: 11.8 G/DL (ref 11.5–16)
IMM GRANULOCYTES # BLD AUTO: 0 K/UL (ref 0–0.04)
IMM GRANULOCYTES NFR BLD AUTO: 0 % (ref 0–0.5)
LYMPHOCYTES # BLD: 3.1 K/UL (ref 0.8–3.5)
LYMPHOCYTES NFR BLD: 33 % (ref 12–49)
MCH RBC QN AUTO: 27.4 PG (ref 26–34)
MCHC RBC AUTO-ENTMCNC: 33.9 G/DL (ref 30–36.5)
MCV RBC AUTO: 80.9 FL (ref 80–99)
MONOCYTES # BLD: 0.5 K/UL (ref 0–1)
MONOCYTES NFR BLD: 5 % (ref 5–13)
NEUTS SEG # BLD: 5.3 K/UL (ref 1.8–8)
NEUTS SEG NFR BLD: 58 % (ref 32–75)
NRBC # BLD: 0 K/UL (ref 0–0.01)
NRBC BLD-RTO: 0 PER 100 WBC
PLATELET # BLD AUTO: 63 K/UL (ref 150–400)
RBC # BLD AUTO: 4.3 M/UL (ref 3.8–5.2)
RBC MORPH BLD: ABNORMAL
WBC # BLD AUTO: 9.3 K/UL (ref 3.6–11)

## 2024-08-01 PROCEDURE — 85025 COMPLETE CBC W/AUTO DIFF WBC: CPT

## 2024-08-01 PROCEDURE — 36415 COLL VENOUS BLD VENIPUNCTURE: CPT

## 2024-08-01 NOTE — TELEPHONE ENCOUNTER
----- Message from KODAK Fuentes - CNP sent at 8/1/2024  3:13 PM EDT -----  I know you have a difficult time getting in touch with her.  Plt stable at 63!  If you do get in touch with her, can you see when/if she started promacta 75?  ----- Message -----  From: Yasmany Thayer Incoming Lab For [facility] Pdfs  Sent: 8/1/2024  11:23 AM EDT  To: Sabina Pop MD

## 2024-08-01 NOTE — PROGRESS NOTES
[facility] Visit Notes                 Date: 2024  Name: Aixa Barkley  MRN: [id number]       : 1990    Pt arrived ambulatory and in no acute distress to [facility] for Labs  Denies fever, cough, and N/V- denies covid-like symptoms.     Labs ordered/ drawn peripherally through Left AC. See Epic Results Review for details.     Tolerated procedure well without issue. PIV removed post infusion. Coban and Gauze applied to site.   Patient aware of upcoming appointment. Patient declined post- monitoring time. Education provided.       Future Appointments   Date Time Provider Department Center   2024 10:00 AM [facility]ER LAB CHAIR 1 [facility]   2024 10:40 AM Prerna Mcfarland APRN - CNP [facility] BS AMB   8/15/2024 10:00 AM HA[facility]ER LAB CHAIR 1 [facility]   2024 11:00 AM [facility] LAB CHAIR 1 [facility]   2024 10:00 AM [facility] LAB CHAIR 1 [facility]   2024  1:30 PM [facility] LAB CHAIR 1 [facility]   2024  9:00 AM [facility] LAB CHAIR 1 [facility]     Christel Chauhan RN  2024

## 2024-08-01 NOTE — TELEPHONE ENCOUNTER
Verified patient ID x 2    Spoke with patient and advised Plt stable at 63. Patient has not started the 75 mg dose yet. Spoke with NP who advised to continue taking the 50 mg for now and we will recheck on 8/8. When the 75 mg tablets arrive patient should hold on to them, but not start the higher dose yet.     Patient verbalized understanding. No further needs.

## 2024-08-08 ENCOUNTER — OFFICE VISIT (OUTPATIENT)
Age: 34
End: 2024-08-08
Payer: MEDICAID

## 2024-08-08 ENCOUNTER — HOSPITAL ENCOUNTER (OUTPATIENT)
Facility: HOSPITAL | Age: 34
Setting detail: INFUSION SERIES
Discharge: HOME | End: 2024-08-08
Payer: MEDICAID

## 2024-08-08 VITALS
RESPIRATION RATE: 18 BRPM | SYSTOLIC BLOOD PRESSURE: 106 MMHG | DIASTOLIC BLOOD PRESSURE: 70 MMHG | TEMPERATURE: 97.4 F | OXYGEN SATURATION: 95 % | WEIGHT: 183 LBS | BODY MASS INDEX: 39.6 KG/M2 | HEART RATE: 71 BPM

## 2024-08-08 DIAGNOSIS — D69.3 CHRONIC ITP (IDIOPATHIC THROMBOCYTOPENIA) (HCC): ICD-10-CM

## 2024-08-08 DIAGNOSIS — D69.3 CHRONIC ITP (IDIOPATHIC THROMBOCYTOPENIA) (HCC): Primary | ICD-10-CM

## 2024-08-08 DIAGNOSIS — D50.0 IRON DEFICIENCY ANEMIA DUE TO CHRONIC BLOOD LOSS: ICD-10-CM

## 2024-08-08 LAB
BASOPHILS # BLD: 0.1 K/UL (ref 0–0.1)
BASOPHILS NFR BLD: 1 % (ref 0–1)
DIFFERENTIAL METHOD BLD: ABNORMAL
EOSINOPHIL # BLD: 0.2 K/UL (ref 0–0.4)
EOSINOPHIL NFR BLD: 3 % (ref 0–7)
ERYTHROCYTE [DISTWIDTH] IN BLOOD BY AUTOMATED COUNT: 14.3 % (ref 11.5–14.5)
HCT VFR BLD AUTO: 34.5 % (ref 35–47)
HGB BLD-MCNC: 11.6 G/DL (ref 11.5–16)
IMM GRANULOCYTES # BLD AUTO: 0 K/UL (ref 0–0.04)
IMM GRANULOCYTES NFR BLD AUTO: 0 % (ref 0–0.5)
LYMPHOCYTES # BLD: 3.3 K/UL (ref 0.8–3.5)
LYMPHOCYTES NFR BLD: 35 % (ref 12–49)
MCH RBC QN AUTO: 27.4 PG (ref 26–34)
MCHC RBC AUTO-ENTMCNC: 33.6 G/DL (ref 30–36.5)
MCV RBC AUTO: 81.4 FL (ref 80–99)
MONOCYTES # BLD: 0.5 K/UL (ref 0–1)
MONOCYTES NFR BLD: 5 % (ref 5–13)
NEUTS SEG # BLD: 5.3 K/UL (ref 1.8–8)
NEUTS SEG NFR BLD: 56 % (ref 32–75)
NRBC # BLD: 0 K/UL (ref 0–0.01)
NRBC BLD-RTO: 0 PER 100 WBC
PLATELET # BLD AUTO: 101 K/UL (ref 150–400)
PMV BLD AUTO: 11.7 FL (ref 8.9–12.9)
RBC # BLD AUTO: 4.24 M/UL (ref 3.8–5.2)
WBC # BLD AUTO: 9.5 K/UL (ref 3.6–11)

## 2024-08-08 PROCEDURE — 99213 OFFICE O/P EST LOW 20 MIN: CPT

## 2024-08-08 PROCEDURE — 36415 COLL VENOUS BLD VENIPUNCTURE: CPT

## 2024-08-08 PROCEDURE — 85025 COMPLETE CBC W/AUTO DIFF WBC: CPT

## 2024-08-08 NOTE — PROGRESS NOTES
Pt arrived to [facility] for labs in stable condition. Labs drawn peripherally from the left ac and sent for processing.     Please see pending results in EPIC.    Pt tolerated treatment well. D/Cd from [facility] in no distress.  Future Appointments   Date Time Provider Department Center   8/8/2024 10:40 AM Prerna Mcfarland APRN - CNP [facility] BS AMB   8/15/2024 10:00 AM [facility] LAB CHAIR 1 [facility]   8/22/2024 11:00 AM [facility] LAB CHAIR 1 [facility]   8/29/2024 10:00 AM [facility] LAB CHAIR 1 [facility]   9/5/2024  1:30 PM [facility] LAB CHAIR 1 [facility]   9/12/2024  9:00 AM [facility] LAB CHAIR 1 [facility]

## 2024-08-08 NOTE — PROGRESS NOTES
Aixa Barkley is a 33 y.o. female     Chief Complaint   Patient presents with    Other     Chronic ITP (idiopathic thrombocytopenia) (HCC)         1. Have you been to the ER, urgent care clinic since your last visit?  Hospitalized since your last visit?No    2. Have you seen or consulted any other health care providers outside of the [facility] since your last visit?  Include any pap smears or colon screening. No

## 2024-08-15 ENCOUNTER — HOSPITAL ENCOUNTER (OUTPATIENT)
Facility: HOSPITAL | Age: 34
Setting detail: INFUSION SERIES
Discharge: HOME | End: 2024-08-15
Payer: MEDICAID

## 2024-08-15 ENCOUNTER — TELEPHONE (OUTPATIENT)
Age: 34
End: 2024-08-15

## 2024-08-15 VITALS
SYSTOLIC BLOOD PRESSURE: 117 MMHG | HEART RATE: 67 BPM | RESPIRATION RATE: 18 BRPM | DIASTOLIC BLOOD PRESSURE: 57 MMHG | TEMPERATURE: 97.7 F

## 2024-08-15 DIAGNOSIS — D69.3 CHRONIC ITP (IDIOPATHIC THROMBOCYTOPENIA) (HCC): ICD-10-CM

## 2024-08-15 PROBLEM — D50.9 IRON DEFICIENCY ANEMIA: Status: ACTIVE | Noted: 2024-05-09

## 2024-08-15 LAB
BASOPHILS # BLD: 0.1 K/UL (ref 0–0.1)
BASOPHILS NFR BLD: 1 % (ref 0–1)
DIFFERENTIAL METHOD BLD: ABNORMAL
EOSINOPHIL # BLD: 0.3 K/UL (ref 0–0.4)
EOSINOPHIL NFR BLD: 3 % (ref 0–7)
ERYTHROCYTE [DISTWIDTH] IN BLOOD BY AUTOMATED COUNT: 13.9 % (ref 11.5–14.5)
FERRITIN SERPL-MCNC: 18 NG/ML (ref 8–252)
HCT VFR BLD AUTO: 36.3 % (ref 35–47)
HGB BLD-MCNC: 11.8 G/DL (ref 11.5–16)
IMM GRANULOCYTES # BLD AUTO: 0 K/UL (ref 0–0.04)
IMM GRANULOCYTES NFR BLD AUTO: 0 % (ref 0–0.5)
IRON SATN MFR SERPL: 15 % (ref 20–50)
IRON SERPL-MCNC: 63 UG/DL (ref 35–150)
LYMPHOCYTES # BLD: 2.9 K/UL (ref 0.8–3.5)
LYMPHOCYTES NFR BLD: 30 % (ref 12–49)
MCH RBC QN AUTO: 27.3 PG (ref 26–34)
MCHC RBC AUTO-ENTMCNC: 32.5 G/DL (ref 30–36.5)
MCV RBC AUTO: 84 FL (ref 80–99)
MONOCYTES # BLD: 0.6 K/UL (ref 0–1)
MONOCYTES NFR BLD: 6 % (ref 5–13)
NEUTS SEG # BLD: 5.8 K/UL (ref 1.8–8)
NEUTS SEG NFR BLD: 60 % (ref 32–75)
NRBC # BLD: 0 K/UL (ref 0–0.01)
NRBC BLD-RTO: 0 PER 100 WBC
PLATELET # BLD AUTO: 53 K/UL (ref 150–400)
PMV BLD AUTO: 11.9 FL (ref 8.9–12.9)
RBC # BLD AUTO: 4.32 M/UL (ref 3.8–5.2)
RBC MORPH BLD: ABNORMAL
TIBC SERPL-MCNC: 427 UG/DL (ref 250–450)
WBC # BLD AUTO: 9.7 K/UL (ref 3.6–11)

## 2024-08-15 PROCEDURE — 85025 COMPLETE CBC W/AUTO DIFF WBC: CPT

## 2024-08-15 PROCEDURE — 83540 ASSAY OF IRON: CPT

## 2024-08-15 PROCEDURE — 36415 COLL VENOUS BLD VENIPUNCTURE: CPT

## 2024-08-15 PROCEDURE — 82728 ASSAY OF FERRITIN: CPT

## 2024-08-15 PROCEDURE — 83550 IRON BINDING TEST: CPT

## 2024-08-15 RX ORDER — DIPHENHYDRAMINE HYDROCHLORIDE 50 MG/ML
50 INJECTION INTRAMUSCULAR; INTRAVENOUS
OUTPATIENT
Start: 2024-08-22

## 2024-08-15 RX ORDER — SODIUM CHLORIDE 9 MG/ML
INJECTION, SOLUTION INTRAVENOUS CONTINUOUS
OUTPATIENT
Start: 2024-08-22

## 2024-08-15 RX ORDER — EPINEPHRINE 1 MG/ML
0.3 INJECTION, SOLUTION, CONCENTRATE INTRAVENOUS PRN
OUTPATIENT
Start: 2024-08-22

## 2024-08-15 RX ORDER — ACETAMINOPHEN 325 MG/1
650 TABLET ORAL
OUTPATIENT
Start: 2024-08-22

## 2024-08-15 RX ORDER — ONDANSETRON 2 MG/ML
8 INJECTION INTRAMUSCULAR; INTRAVENOUS
OUTPATIENT
Start: 2024-08-22

## 2024-08-15 RX ORDER — SODIUM CHLORIDE 9 MG/ML
5-250 INJECTION, SOLUTION INTRAVENOUS PRN
OUTPATIENT
Start: 2024-08-22

## 2024-08-15 RX ORDER — HEPARIN 100 UNIT/ML
500 SYRINGE INTRAVENOUS PRN
OUTPATIENT
Start: 2024-08-22

## 2024-08-15 RX ORDER — SODIUM CHLORIDE 0.9 % (FLUSH) 0.9 %
5-40 SYRINGE (ML) INJECTION PRN
OUTPATIENT
Start: 2024-08-22

## 2024-08-15 RX ORDER — ALBUTEROL SULFATE 90 UG/1
4 AEROSOL, METERED RESPIRATORY (INHALATION) PRN
OUTPATIENT
Start: 2024-08-22

## 2024-08-15 NOTE — PROGRESS NOTES
hospitals Progress Note    Date: August 15, 2024    Name: Aixa Barkley    MRN: [id number]         : 1990      0930:  Pt arrived ambulatory and in no distress, for lab visit.  Labs drawn via left AC, patient tolerated well.        Vitals:    08/15/24 1000   BP: (!) 117/57   Pulse: 67   Resp: 18               Departed hospitals ambulatory and in no distress.    Future Appointments   Date Time Provider Department Center   2024 11:00 AM [facility] LAB CHAIR 1 [facility]   2024 10:00 AM [facility] LAB CHAIR 1 [facility]   2024  1:30 PM [facility] LAB CHAIR 1 [facility]   2024  1:40 PM Sabina Pop MD [facility] BS [facility]   2024  9:00 AM [facility] LAB CHAIR 1 [facility]       Nichelle Knight RN  August 15, 2024

## 2024-08-15 NOTE — TELEPHONE ENCOUNTER
----- Message from Delma NUNEZ sent at 8/15/2024  2:45 PM EDT -----  Added  ----- Message -----  From: Prerna Mcfarland APRN - CNP  Sent: 8/15/2024   2:24 PM EDT  To: Delma Ferrell; Nichelle Farley, RN    Please let patient know plt are at 53.   We will continue with promacta 50 mg daily. Can you see if she missed any doses?  We will recheck next week, if <50, will consider increase to 75 mg.   Has she received the 75 mg dose yet? If not, can you follow up with Martha on this?  Call office with s/s of bleeding.     Labs also show WILI.   Recommend IV iron since she cannot tolerate PO. We discussed this at last visit.   Will try for injectafer x2 doses.   Recommend following up with obgyn if heavy bleeding persists.   Delma please set up with lab appointments.  ----- Message -----  From: Lee Ann Vaughan RPH  Sent: 8/15/2024  12:07 PM EDT  To: KODAK Fuentes CNP    Same dose unless she falls below 50 for more than 2 weeks. Max dose would be 75 mg  ----- Message -----  From: Prerna Mcfarland APRN - CNP  Sent: 8/15/2024  12:06 PM EDT  To: Lee Ann Vaughan RPH    Do we titrate Promacta up to 75 mg?  ----- Message -----  From: Yasmany Thayer Incoming [facility] For [facility] Pdfs  Sent: 8/15/2024  11:51 AM EDT  To: Sabina Pop MD

## 2024-08-15 NOTE — TELEPHONE ENCOUNTER
Left VM that Im calling to discuss lab results. Will try again later this afternoon.        (Please let patient know plt are at 53.   We will continue with promacta 50 mg daily. Can you see if she missed any doses?  We will recheck next week, if <50, will consider increase to 75 mg.   Has she received the 75 mg dose yet? If not, can you follow up with Martha on this?  Call office with s/s of bleeding.      Labs also show WILI.   Recommend IV iron since she cannot tolerate PO. We discussed this at last visit.   Will try for injectafer x2 doses. (Had to change appointment times so she could get IV iron)  Recommend following up with obgyn if heavy bleeding persists.

## 2024-08-16 ENCOUNTER — CLINICAL DOCUMENTATION (OUTPATIENT)
Age: 34
End: 2024-08-16

## 2024-08-16 NOTE — PROGRESS NOTES
08/16/2024    Called [facility] @ [phone] confirm Promacta 75mg script was received and to schedule shipping. Pt will receive medication 08/19/2024 via [employer].

## 2024-08-16 NOTE — TELEPHONE ENCOUNTER
Verified patient ID x 2    Called patient with assistance of [facility]  Jovana.     Let patient know plt are at 53.   We will continue with promacta 50 mg daily. Can you see if she missed any doses?  -She reports not missing any doses    We will recheck next week, if <50, will consider increase to 75 mg.   Has she received the 75 mg dose yet? If not, can you follow up with Martha on this?  -She has not received 75 mg dose    Advised pt to call office with s/s of bleeding.      Labs also show WILI.   Recommend IV iron since she cannot tolerate PO as discussed in last visit.   Will try for injectafer x2 doses. Had to change appointment times so she could get IV iron. Confirmed she can come on 8/22 at 2:30 pm and 8/29 at 3:30 pm.     Recommended following up with obgyn if heavy bleeding persists.     Patient verbalized understanding. No further needs.

## 2024-08-22 ENCOUNTER — HOSPITAL ENCOUNTER (OUTPATIENT)
Facility: HOSPITAL | Age: 34
Setting detail: INFUSION SERIES
Discharge: HOME | End: 2024-08-22
Payer: MEDICAID

## 2024-08-22 ENCOUNTER — APPOINTMENT (OUTPATIENT)
Facility: HOSPITAL | Age: 34
End: 2024-08-22
Payer: MEDICAID

## 2024-08-22 VITALS
OXYGEN SATURATION: 96 % | DIASTOLIC BLOOD PRESSURE: 80 MMHG | SYSTOLIC BLOOD PRESSURE: 129 MMHG | RESPIRATION RATE: 18 BRPM | TEMPERATURE: 98 F | HEART RATE: 66 BPM

## 2024-08-22 DIAGNOSIS — D50.0 IRON DEFICIENCY ANEMIA DUE TO CHRONIC BLOOD LOSS: ICD-10-CM

## 2024-08-22 DIAGNOSIS — D69.3 CHRONIC ITP (IDIOPATHIC THROMBOCYTOPENIA) (HCC): Primary | ICD-10-CM

## 2024-08-22 PROCEDURE — 6360000002 HC RX W HCPCS

## 2024-08-22 PROCEDURE — 96365 THER/PROPH/DIAG IV INF INIT: CPT

## 2024-08-22 PROCEDURE — 2580000003 HC RX 258

## 2024-08-22 RX ORDER — ALBUTEROL SULFATE 90 UG/1
4 AEROSOL, METERED RESPIRATORY (INHALATION) PRN
Status: CANCELLED | OUTPATIENT
Start: 2024-08-29

## 2024-08-22 RX ORDER — ONDANSETRON 2 MG/ML
8 INJECTION INTRAMUSCULAR; INTRAVENOUS
Status: CANCELLED | OUTPATIENT
Start: 2024-08-29

## 2024-08-22 RX ORDER — SODIUM CHLORIDE 0.9 % (FLUSH) 0.9 %
5-40 SYRINGE (ML) INJECTION PRN
Status: CANCELLED | OUTPATIENT
Start: 2024-08-29

## 2024-08-22 RX ORDER — SODIUM CHLORIDE 9 MG/ML
5-250 INJECTION, SOLUTION INTRAVENOUS PRN
Status: CANCELLED | OUTPATIENT
Start: 2024-08-29

## 2024-08-22 RX ORDER — SODIUM CHLORIDE 9 MG/ML
5-250 INJECTION, SOLUTION INTRAVENOUS PRN
Status: DISCONTINUED | OUTPATIENT
Start: 2024-08-22 | End: 2024-08-23 | Stop reason: HOSPADM

## 2024-08-22 RX ORDER — DIPHENHYDRAMINE HYDROCHLORIDE 50 MG/ML
50 INJECTION INTRAMUSCULAR; INTRAVENOUS
Status: CANCELLED | OUTPATIENT
Start: 2024-08-29

## 2024-08-22 RX ORDER — EPINEPHRINE 1 MG/ML
0.3 INJECTION, SOLUTION INTRAMUSCULAR; SUBCUTANEOUS PRN
Status: CANCELLED | OUTPATIENT
Start: 2024-08-29

## 2024-08-22 RX ORDER — HEPARIN 100 UNIT/ML
500 SYRINGE INTRAVENOUS PRN
Status: CANCELLED | OUTPATIENT
Start: 2024-08-29

## 2024-08-22 RX ORDER — ACETAMINOPHEN 325 MG/1
650 TABLET ORAL
Status: CANCELLED | OUTPATIENT
Start: 2024-08-29

## 2024-08-22 RX ORDER — SODIUM CHLORIDE 9 MG/ML
INJECTION, SOLUTION INTRAVENOUS CONTINUOUS
Status: CANCELLED | OUTPATIENT
Start: 2024-08-29

## 2024-08-22 RX ADMIN — SODIUM CHLORIDE 50 ML/HR: 9 INJECTION, SOLUTION INTRAVENOUS at 15:02

## 2024-08-22 RX ADMIN — FERRIC CARBOXYMALTOSE INJECTION 750 MG: 50 INJECTION, SOLUTION INTRAVENOUS at 15:31

## 2024-08-22 ASSESSMENT — PAIN SCALES - GENERAL: PAINLEVEL_OUTOF10: 0

## 2024-08-22 NOTE — PROGRESS NOTES
[facility]IC Progress Note    Date: August 22, 2024         Pt arrived [facility] in stable condition.  Assessment completed. PIV placed to Left forearm with positive blood return.     .    Patient Vitals for the past 12 hrs:   Temp Pulse Resp BP SpO2   08/22/24 1545 -- 66 -- 129/80 --   08/22/24 1445 98 °F (36.7 °C) 70 18 120/72 96 %         Medications Administered         0.9 % sodium chloride infusion Admin Date  08/22/2024 Action  New Bag Dose  50 mL/hr Rate  50 mL/hr Route  IntraVENous Documented By  Arielle Machado, RN        ferric carboxymaltose (INJECTAFER) 750 mg in sodium chloride 0.9 % 250 mL IVPB Admin Date  08/22/2024 Action  New Bag Dose  750 mg Rate  870 mL/hr Route  IntraVENous Documented By  rAielle Machado, RN           Ms. Lay Barkley  decline post infusion observation period.    Ms. Whitehead tolerated the infusion, and had no complaints.    PIV flushed and removed. 2x2 and coban placed    Ms. White[facility]. Patient is aware of next scheduled [facility] appointment.         Future Appointments   Date Time Provider Department Center   8/29/2024  3:30 PM [facility] FASTTRACK 1 [facility]   9/5/2024  9:00 AM [facility] LAB CHAIR 1 [facility]   9/5/2024  9:20 AM Sabina Pop MD [facility] BS AMB   9/12/2024  9:00 AM [facility] LAB CHAIR 1 [facility]   9/19/2024 11:00 AM [facility] LAB CHAIR 1 [facility]   9/26/2024  8:00 AM [facility]   10/3/2024  9:00 AM [facility]   10/10/2024  9:00 AM [facility]   10/17/2024  9:00 AM [facility]   10/24/2024  1:30 PM [facility]   10/31/2024 11:00 AM [facility]   11/7/2024 11:00 AM [facility]   11/14/2024  9:00 AM [facility]         Arielle Machado, RN, RN  August 22, 2024

## 2024-08-29 ENCOUNTER — HOSPITAL ENCOUNTER (OUTPATIENT)
Facility: HOSPITAL | Age: 34
Setting detail: INFUSION SERIES
Discharge: HOME | End: 2024-08-29
Payer: MEDICAID

## 2024-08-29 ENCOUNTER — APPOINTMENT (OUTPATIENT)
Facility: HOSPITAL | Age: 34
End: 2024-08-29
Payer: MEDICAID

## 2024-08-29 VITALS
TEMPERATURE: 97.5 F | HEART RATE: 78 BPM | BODY MASS INDEX: 39.48 KG/M2 | DIASTOLIC BLOOD PRESSURE: 59 MMHG | WEIGHT: 182.98 LBS | HEIGHT: 57 IN | OXYGEN SATURATION: 98 % | SYSTOLIC BLOOD PRESSURE: 115 MMHG | RESPIRATION RATE: 16 BRPM

## 2024-08-29 DIAGNOSIS — D50.0 IRON DEFICIENCY ANEMIA DUE TO CHRONIC BLOOD LOSS: ICD-10-CM

## 2024-08-29 DIAGNOSIS — D69.3 CHRONIC ITP (IDIOPATHIC THROMBOCYTOPENIA) (HCC): Primary | ICD-10-CM

## 2024-08-29 LAB
BASOPHILS # BLD: 0.1 K/UL (ref 0–0.1)
BASOPHILS NFR BLD: 1 % (ref 0–1)
DIFFERENTIAL METHOD BLD: ABNORMAL
EOSINOPHIL # BLD: 0.2 K/UL (ref 0–0.4)
EOSINOPHIL NFR BLD: 2 % (ref 0–7)
ERYTHROCYTE [DISTWIDTH] IN BLOOD BY AUTOMATED COUNT: 13.6 % (ref 11.5–14.5)
HCT VFR BLD AUTO: 35.4 % (ref 35–47)
HGB BLD-MCNC: 11.8 G/DL (ref 11.5–16)
IMM GRANULOCYTES # BLD AUTO: 0.1 K/UL (ref 0–0.04)
IMM GRANULOCYTES NFR BLD AUTO: 1 % (ref 0–0.5)
LYMPHOCYTES # BLD: 2.8 K/UL (ref 0.8–3.5)
LYMPHOCYTES NFR BLD: 30 % (ref 12–49)
MCH RBC QN AUTO: 27.9 PG (ref 26–34)
MCHC RBC AUTO-ENTMCNC: 33.3 G/DL (ref 30–36.5)
MCV RBC AUTO: 83.7 FL (ref 80–99)
MONOCYTES # BLD: 0.5 K/UL (ref 0–1)
MONOCYTES NFR BLD: 5 % (ref 5–13)
NEUTS SEG # BLD: 5.6 K/UL (ref 1.8–8)
NEUTS SEG NFR BLD: 61 % (ref 32–75)
NRBC # BLD: 0 K/UL (ref 0–0.01)
NRBC BLD-RTO: 0 PER 100 WBC
PLATELET # BLD AUTO: 86 K/UL (ref 150–400)
PMV BLD AUTO: 12.7 FL (ref 8.9–12.9)
RBC # BLD AUTO: 4.23 M/UL (ref 3.8–5.2)
RBC MORPH BLD: ABNORMAL
WBC # BLD AUTO: 9.3 K/UL (ref 3.6–11)

## 2024-08-29 PROCEDURE — 36415 COLL VENOUS BLD VENIPUNCTURE: CPT

## 2024-08-29 PROCEDURE — 96365 THER/PROPH/DIAG IV INF INIT: CPT

## 2024-08-29 PROCEDURE — 2580000003 HC RX 258

## 2024-08-29 PROCEDURE — 6360000002 HC RX W HCPCS

## 2024-08-29 PROCEDURE — 85025 COMPLETE CBC W/AUTO DIFF WBC: CPT

## 2024-08-29 RX ORDER — DIPHENHYDRAMINE HYDROCHLORIDE 50 MG/ML
50 INJECTION INTRAMUSCULAR; INTRAVENOUS
OUTPATIENT
Start: 2024-08-29

## 2024-08-29 RX ORDER — ONDANSETRON 2 MG/ML
8 INJECTION INTRAMUSCULAR; INTRAVENOUS
OUTPATIENT
Start: 2024-08-29

## 2024-08-29 RX ORDER — HEPARIN 100 UNIT/ML
500 SYRINGE INTRAVENOUS PRN
Status: DISCONTINUED | OUTPATIENT
Start: 2024-08-29 | End: 2024-08-30 | Stop reason: HOSPADM

## 2024-08-29 RX ORDER — SODIUM CHLORIDE 9 MG/ML
5-250 INJECTION, SOLUTION INTRAVENOUS PRN
Status: CANCELLED | OUTPATIENT
Start: 2024-08-29

## 2024-08-29 RX ORDER — SODIUM CHLORIDE 9 MG/ML
5-250 INJECTION, SOLUTION INTRAVENOUS PRN
Status: DISCONTINUED | OUTPATIENT
Start: 2024-08-29 | End: 2024-08-30 | Stop reason: HOSPADM

## 2024-08-29 RX ORDER — SODIUM CHLORIDE 0.9 % (FLUSH) 0.9 %
5-40 SYRINGE (ML) INJECTION PRN
Status: DISCONTINUED | OUTPATIENT
Start: 2024-08-29 | End: 2024-08-30 | Stop reason: HOSPADM

## 2024-08-29 RX ORDER — HEPARIN 100 UNIT/ML
500 SYRINGE INTRAVENOUS PRN
OUTPATIENT
Start: 2024-08-29

## 2024-08-29 RX ORDER — ALBUTEROL SULFATE 90 UG/1
4 AEROSOL, METERED RESPIRATORY (INHALATION) PRN
OUTPATIENT
Start: 2024-08-29

## 2024-08-29 RX ORDER — SODIUM CHLORIDE 9 MG/ML
5-250 INJECTION, SOLUTION INTRAVENOUS PRN
OUTPATIENT
Start: 2024-08-29

## 2024-08-29 RX ORDER — SODIUM CHLORIDE 0.9 % (FLUSH) 0.9 %
5-40 SYRINGE (ML) INJECTION PRN
OUTPATIENT
Start: 2024-08-29

## 2024-08-29 RX ORDER — SODIUM CHLORIDE 9 MG/ML
INJECTION, SOLUTION INTRAVENOUS CONTINUOUS
OUTPATIENT
Start: 2024-08-29

## 2024-08-29 RX ORDER — EPINEPHRINE 1 MG/ML
0.3 INJECTION, SOLUTION INTRAMUSCULAR; SUBCUTANEOUS PRN
OUTPATIENT
Start: 2024-08-29

## 2024-08-29 RX ORDER — ACETAMINOPHEN 325 MG/1
650 TABLET ORAL
OUTPATIENT
Start: 2024-08-29

## 2024-08-29 RX ADMIN — SODIUM CHLORIDE 50 ML/HR: 9 INJECTION, SOLUTION INTRAVENOUS at 16:21

## 2024-08-29 RX ADMIN — FERRIC CARBOXYMALTOSE INJECTION 750 MG: 50 INJECTION, SOLUTION INTRAVENOUS at 16:22

## 2024-08-29 NOTE — PROGRESS NOTES
[facility] Visit Notes                 Date: 2024  Name: Aixa Whitehead  MRN: [id number]       : 1990    Pt admit to [facility] for Injectafer - ambulatory in stable condition.   Assessment completed. Denies fever, cough, and N/V- denies covid-like symptoms.     IV placed peripherally in Right AC 24g with positive blood return.   Labs ordered/ drawn. See Epic Results Review for details.   Tolerated procedure well without issue. PIV removed post infusion. Coban and Gauze applied to site.   Patient aware of upcoming appointment. Patient declined post- monitoring time. Education provided.     Ms. Whitehead's vitals were reviewed prior to treatment.   Patient Vitals for the past 12 hrs:   Temp Pulse Resp BP SpO2   24 1645 -- 78 16 (!) 115/59 --   24 1537 97.5 °F (36.4 °C) 81 16 117/68 98 %     Medications Administered         0.9 % sodium chloride infusion Admin Date  2024 Action  New Bag Dose  50 mL/hr Rate  50 mL/hr Route  IntraVENous Documented By  Christel Chauhan RN        ferric carboxymaltose (INJECTAFER) 750 mg in sodium chloride 0.9 % 250 mL IVPB Admin Date  2024 Action  New Bag Dose  750 mg Rate  870 mL/hr Route  IntraVENous Documented By  Christel Chauhan RN        ferric carboxymaltose (INJECTAFER) 750 mg in sodium chloride 0.9 % 250 mL IVPB Admin Date  2024 Action  Rate/Dose Verify Dose   Rate  870 mL/hr Route  IntraVENous Documented By  Christel Chauhan RN          Future Appointments   Date Time Provider Department Center   2024  9:00 AM [facility] LAB CHAIR 1 [facility]   2024  9:20 AM Sabina Pop MD [facility] BS AMB   2024  9:00 AM HA[facility]ER LAB CHAIR 1 [facility]   2024 11:00 AM HA[facility]ER LAB CHAIR 1 [facility]   2024  8:00 AM HA[facility]ER LAB CHAIR 1 [facility]   10/3/2024  9:00 AM HAIDER LAB CHAIR 1 [facility]   10/10/2024  9:00 AM HAIDER LAB CHAIR 1 [facility]   10/17/2024  9:00 AM [facility] LAB CHAIR 1 [facility]   10/24/2024  1:30 PM [facility]

## 2024-09-04 NOTE — PROGRESS NOTES
[facility]  Medical Oncology   [phone]    Hematology / Oncology Follow-up    Reason for Visit:   Aixa Whitehead is a 33 y.o. female who is seen for follow-up of ITP and anemia.    Hematology Treatment History:    1/2024: presented with platelets 6k. Received dexamethasone (1/27 - 1/30/24) and IVIG x 2 doses.   4/12/2024: platelets 4k. Treated with dexamethasone (4/12 - 4/15/24) and IVIG x 2 doses  4/29/24: platelets 22k. Started prednisone 1 mg/kg (80 mg) with plan for taper. Repeat platelets on 5/3 with platelets 11k. Switched to dexamethasone 40 mg x 4 days (5/6 - 5/9/24)  5/16/24: plt 22k  5/23/24: IVIG x1 dose. Plan to proceed with promacta but could not obtain due to access issues.   6/7/24: plt 20k   6/10/24: initiated on weekly Romiplastim  7/7/24: Switched to Promacta d/t patient preference    Assessment/Plan:   #) Chronic relapsed/refractory ITP:  Treatment history as above.   Steroid and IVIG responsive but quick relapses on taper.  She is not a candidate for Rituximab given prior hepatitis B infection.   Plan was to initiate promacta but could not obtain medication due to access issues. Therefore, we started  weekly Nplate 6/10/24. She had response to Nplate (plt 20k --> 68k) but switched to Promacta 7/7/24 due to patient preference. She has now received free-drug approval for Promacta.    She presents for follow up. Currently on Promacta 50 mg daily. Tolerating this well without side effects. Platelets at goal (112k).     - Continue Promacta to 50 mg daily   - Monitor CBC monthly   - Counseled on alarm symptoms   - Return to clinic in 8 weeks with labs prior to visit    #) Iron deficiency anemia:  Due to menorrhagia. Unable to tolerate oral iron. Status post Injectafer x2 doses (8/22, 8/29/24)  - Recheck iron studies in ~2 months    #) Barriers to health:   Questions regarding finances  - Consult to      Case discussed with Dr. Sabina Pop.   Patient to

## 2024-09-04 NOTE — PROGRESS NOTES
[facility]  Medical Oncology   [phone]    Hematology / Oncology Follow-up    Reason for Visit:   Aixa Whitehead is a 33 y.o. female who is seen for follow-up of ITP and anemia.    Hematology Treatment History:    1/2024: presented with platelets 6k. Received dexamethasone (1/27 - 1/30/24) and IVIG x 2 doses.   4/12/2024: platelets 4k. Treated with dexamethasone (4/12 - 4/15/24) and IVIG x 2 doses  4/29/24: platelets 22k. Started prednisone 1 mg/kg (80 mg) with plan for taper. Repeat platelets on 5/3 with platelets 11k. Switched to dexamethasone 40 mg x 4 days (5/6 - 5/9/24)  5/16/24: plt 22k  5/23/24: IVIG x1 dose. Plan to proceed with promacta but could not obtain due to access issues.   6/7/24: plt 20k   6/10/24: initiated on weekly Romiplastim  7/7/24: Switched to Promacta d/t patient preference    Assessment/Plan:   #) Chronic relapsed/refractory ITP:  Treatment history as above.   Steroid and IVIG responsive but quick relapses on taper.  She is not a candidate for Rituximab given prior hepatitis B infection.   Plan was to initiate promacta but could not obtain medication due to access issues. Therefore, we started  weekly Nplate 6/10/24. She had response to Nplate (plt 20k --> 68k) but switched to Promacta 7/7/24 due to patient preference. She has now received free-drug approval for Promacta.    She presents for follow up. Currently on Promacta 50 mg daily **. Platelets at goal (86k). Tolerating this ***    - Continue Promacta to 50 mg daily   - Monitor CBC monthly   - Counseled on alarm symptoms   - Return to clinic in 8 weeks with labs prior to visit    #) Iron deficiency anemia:  Due to menorrhagia. Unable to tolerate oral iron.   - Proceed with Injectafer      Signed By: Sabina Pop MD   9/3/2024 at 8:18 PM     Interval History:   Chart reviewed, interval events since last visit noted.     History obtained via .  Presents today for follow up.

## 2024-09-05 ENCOUNTER — OFFICE VISIT (OUTPATIENT)
Age: 34
End: 2024-09-05
Payer: MEDICAID

## 2024-09-05 ENCOUNTER — HOSPITAL ENCOUNTER (OUTPATIENT)
Facility: HOSPITAL | Age: 34
Setting detail: INFUSION SERIES
Discharge: HOME | End: 2024-09-05
Payer: MEDICAID

## 2024-09-05 VITALS
WEIGHT: 185 LBS | DIASTOLIC BLOOD PRESSURE: 78 MMHG | OXYGEN SATURATION: 99 % | TEMPERATURE: 98.4 F | BODY MASS INDEX: 40.03 KG/M2 | SYSTOLIC BLOOD PRESSURE: 124 MMHG | HEART RATE: 77 BPM | RESPIRATION RATE: 18 BRPM

## 2024-09-05 DIAGNOSIS — D69.3 CHRONIC ITP (IDIOPATHIC THROMBOCYTOPENIA) (HCC): ICD-10-CM

## 2024-09-05 DIAGNOSIS — D69.3 CHRONIC ITP (IDIOPATHIC THROMBOCYTOPENIA) (HCC): Primary | ICD-10-CM

## 2024-09-05 DIAGNOSIS — D50.0 IRON DEFICIENCY ANEMIA DUE TO CHRONIC BLOOD LOSS: ICD-10-CM

## 2024-09-05 LAB
BASOPHILS # BLD: 0.1 K/UL (ref 0–0.1)
BASOPHILS NFR BLD: 1 % (ref 0–1)
DIFFERENTIAL METHOD BLD: ABNORMAL
EOSINOPHIL # BLD: 0.2 K/UL (ref 0–0.4)
EOSINOPHIL NFR BLD: 2 % (ref 0–7)
ERYTHROCYTE [DISTWIDTH] IN BLOOD BY AUTOMATED COUNT: 14.5 % (ref 11.5–14.5)
HCT VFR BLD AUTO: 37.9 % (ref 35–47)
HGB BLD-MCNC: 12.6 G/DL (ref 11.5–16)
IMM GRANULOCYTES # BLD AUTO: 0.1 K/UL (ref 0–0.04)
IMM GRANULOCYTES NFR BLD AUTO: 1 % (ref 0–0.5)
LYMPHOCYTES # BLD: 3.7 K/UL (ref 0.8–3.5)
LYMPHOCYTES NFR BLD: 34 % (ref 12–49)
MCH RBC QN AUTO: 28.3 PG (ref 26–34)
MCHC RBC AUTO-ENTMCNC: 33.2 G/DL (ref 30–36.5)
MCV RBC AUTO: 85 FL (ref 80–99)
MONOCYTES # BLD: 0.6 K/UL (ref 0–1)
MONOCYTES NFR BLD: 5 % (ref 5–13)
NEUTS SEG # BLD: 6.3 K/UL (ref 1.8–8)
NEUTS SEG NFR BLD: 57 % (ref 32–75)
NRBC # BLD: 0 K/UL (ref 0–0.01)
NRBC BLD-RTO: 0 PER 100 WBC
PLATELET # BLD AUTO: 112 K/UL (ref 150–400)
PMV BLD AUTO: 12.3 FL (ref 8.9–12.9)
RBC # BLD AUTO: 4.46 M/UL (ref 3.8–5.2)
WBC # BLD AUTO: 11 K/UL (ref 3.6–11)

## 2024-09-05 PROCEDURE — 36415 COLL VENOUS BLD VENIPUNCTURE: CPT

## 2024-09-05 PROCEDURE — 99213 OFFICE O/P EST LOW 20 MIN: CPT

## 2024-09-05 PROCEDURE — 85025 COMPLETE CBC W/AUTO DIFF WBC: CPT

## 2024-09-05 NOTE — PROGRESS NOTES
[facility] Progress Note    Date: 2024    Name: Aixa Whitehead    MRN: [id number]         : 1990      Pt arrived ambulatory and in no distress, for lab visit.  Labs drawn via left AC, patient tolerated well.        Departed [facility] ambulatory and in no distress.    Future Appointments   Date Time Provider Department Center   2024  9:00 AM [facility] LAB CHAIR 1 [facility]   2024 11:00 AM HA[facility]ER LAB CHAIR 1 [facility]   2024  8:00 AM [facility]ER LAB CHAIR 1 [facility]   10/3/2024  9:00 AM [facility]ER LAB CHAIR 1 [facility]   10/10/2024  9:00 AM [facility]ER LAB CHAIR 1 [facility]   10/17/2024  9:00 AM [facility]ER LAB CHAIR 1 [facility]   10/24/2024  1:30 PM [facility]ER LAB CHAIR 1 [facility]   10/31/2024 11:00 AM [facility]ER LAB CHAIR 1 [facility]   2024 11:00 AM [facility]ER LAB CHAIR 1 [facility]   2024  9:00 AM [facility] LAB CHAIR 1 [facility]   2024  2:30 PM Leona Khanna MD [facility] BS AMB       Delma Cortez RN  2024

## 2024-09-05 NOTE — PROGRESS NOTES
Aixa BillyKomal is a 33 y.o. female    Chief Complaint   Patient presents with    Follow-up     ITP and anemia       1. Have you been to the ER, urgent care clinic since your last visit?  Hospitalized since your last visit?No    2. Have you seen or consulted any other health care providers outside of the [facility] since your last visit?  Include any pap smears or colon screening. No

## 2024-09-06 ENCOUNTER — TELEPHONE (OUTPATIENT)
Age: 34
End: 2024-09-06

## 2024-09-06 NOTE — TELEPHONE ENCOUNTER
[facility]  Oncology Social Work Encounter    [] Med-Onc [facility] [] Med-Onc [facility] [x] Med-Onc [facility] [] Rad-Onc [facility] [] Rad-Onc [facility] [] Rad-Onc [facility] [] Rad-Onc [facility] [] [facility]    Patient: Aixa Whitehead    Encounter Type:    [] Initial SW Encounter  [] Patient Initiated  [x] Referral  [] Distress/PHQ Screening  [] Other:      Concern(s)/Barrier(s) to Care: financial    Narrative: SW referral received by RONNA Mcfarland re: pt's financial assistance status. Contacted patient with [employer] . She explains that she did not receive a notice letter from Financial Counseling. This SW informed her that she had been approved for a full write-off through 8/31/24. Explained that she will need to reapply for continued financial assistance. Offered to mail al copy of approval letter, along with blank application form for renewal application, as well as with a copy of what she previously submitted to guide her in completing the renewal application. She was appreciative and agreed to this, and confirmed the mailing address on file. Reports no other needs at this time.     Referral/Handouts:        Financial/Medication assistance referral     Plan:   Mail copy of FA approval letter, application form, and previous application submission to patient's home address.  Ongoing psychosocial support as desired by patient.    Signed by: Gavi Velazquez LMSW     Medical Oncology   Direct #: [phone]

## 2024-09-18 ENCOUNTER — CLINICAL DOCUMENTATION (OUTPATIENT)
Age: 34
End: 2024-09-18

## 2024-10-03 ENCOUNTER — HOSPITAL ENCOUNTER (OUTPATIENT)
Facility: HOSPITAL | Age: 34
Setting detail: INFUSION SERIES
Discharge: HOME | End: 2024-10-03
Payer: MEDICAID

## 2024-10-03 DIAGNOSIS — D69.3 CHRONIC ITP (IDIOPATHIC THROMBOCYTOPENIA) (HCC): ICD-10-CM

## 2024-10-03 LAB
BASOPHILS # BLD: 0.1 K/UL (ref 0–0.1)
BASOPHILS NFR BLD: 1 % (ref 0–1)
DIFFERENTIAL METHOD BLD: ABNORMAL
EOSINOPHIL # BLD: 0.4 K/UL (ref 0–0.4)
EOSINOPHIL NFR BLD: 4 % (ref 0–7)
ERYTHROCYTE [DISTWIDTH] IN BLOOD BY AUTOMATED COUNT: 15.7 % (ref 11.5–14.5)
HCT VFR BLD AUTO: 39.5 % (ref 35–47)
HGB BLD-MCNC: 13.6 G/DL (ref 11.5–16)
IMM GRANULOCYTES # BLD AUTO: 0 K/UL (ref 0–0.04)
IMM GRANULOCYTES NFR BLD AUTO: 0 % (ref 0–0.5)
LYMPHOCYTES # BLD: 3.2 K/UL (ref 0.8–3.5)
LYMPHOCYTES NFR BLD: 36 % (ref 12–49)
MCH RBC QN AUTO: 29 PG (ref 26–34)
MCHC RBC AUTO-ENTMCNC: 34.4 G/DL (ref 30–36.5)
MCV RBC AUTO: 84.2 FL (ref 80–99)
MONOCYTES # BLD: 0.5 K/UL (ref 0–1)
MONOCYTES NFR BLD: 6 % (ref 5–13)
NEUTS SEG # BLD: 4.8 K/UL (ref 1.8–8)
NEUTS SEG NFR BLD: 53 % (ref 32–75)
NRBC # BLD: 0 K/UL (ref 0–0.01)
NRBC BLD-RTO: 0 PER 100 WBC
PLATELET # BLD AUTO: 52 K/UL (ref 150–400)
RBC # BLD AUTO: 4.69 M/UL (ref 3.8–5.2)
RBC MORPH BLD: ABNORMAL
WBC # BLD AUTO: 9 K/UL (ref 3.6–11)
WBC MORPH BLD: ABNORMAL

## 2024-10-03 PROCEDURE — 36415 COLL VENOUS BLD VENIPUNCTURE: CPT

## 2024-10-03 PROCEDURE — 85025 COMPLETE CBC W/AUTO DIFF WBC: CPT

## 2024-10-03 NOTE — PROGRESS NOTES
[facility] Short Note  Date: October 3, 2024  Pt admit to [facility] for labs ambulatory in stable condition. Labs drawn peripherally from LAC and sent for processing.  No data found.  Ms. Whitehead tolerated the procedure, and had no complaints.    Ms. Whitehead [facility] condition.     Future Appointments   Date Time Provider Department Center   10/31/2024 11:00 AM [facility] LAB CHAIR 1 [facility]   10/31/2024 11:20 AM Prerna Mcfarland APRN - CNP [facility] BS AMB   11/19/2024  2:30 PM Leona Khanna MD [facility] BS AMB     TERRENCE HUGHES, RN  October 3, 2024  9:31 AM

## 2024-10-04 ENCOUNTER — TELEPHONE (OUTPATIENT)
Age: 34
End: 2024-10-04

## 2024-10-04 DIAGNOSIS — D69.3 CHRONIC ITP (IDIOPATHIC THROMBOCYTOPENIA) (HCC): Primary | ICD-10-CM

## 2024-10-04 DIAGNOSIS — Z79.899 HIGH RISK MEDICATION USE: ICD-10-CM

## 2024-10-04 RX ORDER — ELTROMBOPAG OLAMINE 75 MG/1
75 TABLET, FILM COATED ORAL DAILY
Qty: 30 TABLET | Refills: 2 | Status: ACTIVE | OUTPATIENT
Start: 2024-10-04

## 2024-10-04 NOTE — TELEPHONE ENCOUNTER
Verified patient ID x 2    Called patient. Advised per NP that platelets have decreased to 52.     She has not missed any doses of promacta. However, she reports she was previously taking 75 mg. She went back to 50 mg 2 weeks ago.     No signs of bleeding.     Let patient know I would pass along info to provider and return call if she needs a lab recheck before her next one on 10/31.

## 2024-10-04 NOTE — TELEPHONE ENCOUNTER
----- Message from KODAK Herbert - CNP sent at 10/3/2024  3:43 PM EDT -----  Can you please make sure she is taking her promacta 50 mg daily?  Plt decrease to 52.   Any missed doses of promacta? Any bleeding concerns?  ----- Message -----  From: Yasmany Thayer Incoming Lab For Copath Pdfs  Sent: 10/3/2024  10:53 AM EDT  To: Sabina Pop MD

## 2024-10-04 NOTE — TELEPHONE ENCOUNTER
10/04/2024    Called [facility] to inform [facility] that pt Promacta dose has changed. Was informed it takes 48-72 hours before script will show in queue. Will follow up Monday.

## 2024-10-04 NOTE — TELEPHONE ENCOUNTER
Verified patient ID x 2    Advised patient per NP:  Increase to 75 mg daily    Call cover my meds to schedule delivery   P [phone]     Lab recheck: 10/10 @ 1:30 pm    Patient verbalized understanding.

## 2024-10-10 ENCOUNTER — HOSPITAL ENCOUNTER (OUTPATIENT)
Facility: HOSPITAL | Age: 34
Setting detail: INFUSION SERIES
Discharge: HOME | End: 2024-10-10
Payer: MEDICAID

## 2024-10-10 ENCOUNTER — APPOINTMENT (OUTPATIENT)
Facility: HOSPITAL | Age: 34
End: 2024-10-10
Payer: MEDICAID

## 2024-10-10 DIAGNOSIS — D69.3 CHRONIC ITP (IDIOPATHIC THROMBOCYTOPENIA) (HCC): ICD-10-CM

## 2024-10-10 LAB
BASOPHILS # BLD: 0.1 K/UL (ref 0–0.1)
BASOPHILS NFR BLD: 1 % (ref 0–1)
DIFFERENTIAL METHOD BLD: ABNORMAL
EOSINOPHIL # BLD: 0.3 K/UL (ref 0–0.4)
EOSINOPHIL NFR BLD: 2 % (ref 0–7)
ERYTHROCYTE [DISTWIDTH] IN BLOOD BY AUTOMATED COUNT: 14.8 % (ref 11.5–14.5)
HCT VFR BLD AUTO: 40.1 % (ref 35–47)
HGB BLD-MCNC: 13.5 G/DL (ref 11.5–16)
IMM GRANULOCYTES # BLD AUTO: 0.1 K/UL (ref 0–0.04)
IMM GRANULOCYTES NFR BLD AUTO: 1 % (ref 0–0.5)
LYMPHOCYTES # BLD: 3.6 K/UL (ref 0.8–3.5)
LYMPHOCYTES NFR BLD: 33 % (ref 12–49)
MCH RBC QN AUTO: 28.8 PG (ref 26–34)
MCHC RBC AUTO-ENTMCNC: 33.7 G/DL (ref 30–36.5)
MCV RBC AUTO: 85.7 FL (ref 80–99)
MONOCYTES # BLD: 0.6 K/UL (ref 0–1)
MONOCYTES NFR BLD: 6 % (ref 5–13)
NEUTS SEG # BLD: 6.2 K/UL (ref 1.8–8)
NEUTS SEG NFR BLD: 57 % (ref 32–75)
NRBC # BLD: 0 K/UL (ref 0–0.01)
NRBC BLD-RTO: 0 PER 100 WBC
PLATELET # BLD AUTO: 158 K/UL (ref 150–400)
PMV BLD AUTO: 11.1 FL (ref 8.9–12.9)
RBC # BLD AUTO: 4.68 M/UL (ref 3.8–5.2)
WBC # BLD AUTO: 10.9 K/UL (ref 3.6–11)

## 2024-10-10 PROCEDURE — 36415 COLL VENOUS BLD VENIPUNCTURE: CPT

## 2024-10-10 PROCEDURE — 85025 COMPLETE CBC W/AUTO DIFF WBC: CPT

## 2024-10-10 NOTE — PROGRESS NOTES
[facility] Short Note  Date: October 10, 2024  Pt admit to [facility] for labs ambulatory in stable condition. Labs drawn peripherally from LAC and sent for processing.    Ms. Whitehead tolerated the procedure, and had no complaints.    Ms. Whitehead was [facility] in stable condition.     Future Appointments   Date Time Provider Department Center   10/31/2024 11:00 AM [facility] LAB CHAIR 1 [facility]   10/31/2024 11:20 AM Prerna Mcfarland APRN - CNP [facility] BS AMB   11/19/2024  2:30 PM Leona Khanna MD [facility] BS AMB     TERRENCE HUGHES, RN  October 10, 2024  1:57 PM

## 2024-10-11 ENCOUNTER — TELEPHONE (OUTPATIENT)
Age: 34
End: 2024-10-11

## 2024-10-11 NOTE — TELEPHONE ENCOUNTER
Verified patient ID x 2    Let patient know that her platelets are within normal limits (158). We will continue current dose of promacta, 75 mg. Patient verbalized understanding. No further needs.

## 2024-10-11 NOTE — TELEPHONE ENCOUNTER
----- Message from Dr. Sabina Pop MD sent at 10/11/2024  7:59 AM EDT -----  Please let patient know that her platelets are within normal limit (158). We will continue at current dose of Promacta.

## 2024-10-25 NOTE — PROGRESS NOTES
[facility]  Medical Oncology   [phone]    Hematology / Oncology Follow-up    Reason for Visit:   Aixa Whitehead is a 34 y.o. female who is seen for follow-up of ITP and anemia.    Hematology Treatment History:    1/2024: presented with platelets 6k. Received dexamethasone (1/27 - 1/30/24) and IVIG x 2 doses.   4/12/2024: platelets 4k. Treated with dexamethasone (4/12 - 4/15/24) and IVIG x 2 doses  4/29/24: platelets 22k. Started prednisone 1 mg/kg (80 mg) with plan for taper. Repeat platelets on 5/3 with platelets 11k. Switched to dexamethasone 40 mg x 4 days (5/6 - 5/9/24)  5/16/24: plt 22k  5/23/24: IVIG x1 dose. Plan to proceed with promacta but could not obtain due to access issues.   6/7/24: plt 20k   6/10/24: initiated on weekly Romiplastim  7/7/24: Switched to Promacta d/t patient preference    Assessment/Plan:   #) Chronic relapsed/refractory ITP:  Treatment history as above.   Steroid and IVIG responsive but quick relapses on taper.  She is not a candidate for Rituximab given prior hepatitis B infection.   Plan was to initiate promacta but could not obtain medication due to access issues. Therefore, we started  weekly Nplate 6/10/24. She had response to Nplate (plt 20k --> 68k) but switched to Promacta 7/7/24 due to patient preference. She has now received free-drug approval for Promacta.    She presents for follow up. Currently on Promacta 75 mg daily. She has not been on Promacta for 3 weeks because she ran out of the medicine. Platelets today 54k. She denies bleeding concerns.      - Continue Promacta to 75 mg daily   - Recheck CBC in 1 week  - Space out labs check to monthly once platelets stable   - Counseled on alarm symptoms   - Return to clinic in 3 months with labs prior to visit    #) Iron deficiency anemia:  Due to menorrhagia. Unable to tolerate oral iron. Status post Injectafer x2 doses (8/22, 8/29/24). Repeat iron studies without complete resolution of WILI     #)

## 2024-10-31 ENCOUNTER — HOSPITAL ENCOUNTER (OUTPATIENT)
Facility: HOSPITAL | Age: 34
Setting detail: INFUSION SERIES
Discharge: HOME | End: 2024-10-31
Payer: MEDICAID

## 2024-10-31 ENCOUNTER — TELEPHONE (OUTPATIENT)
Age: 34
End: 2024-10-31

## 2024-10-31 ENCOUNTER — OFFICE VISIT (OUTPATIENT)
Age: 34
End: 2024-10-31
Payer: MEDICAID

## 2024-10-31 VITALS
RESPIRATION RATE: 18 BRPM | BODY MASS INDEX: 39.51 KG/M2 | HEART RATE: 70 BPM | SYSTOLIC BLOOD PRESSURE: 109 MMHG | DIASTOLIC BLOOD PRESSURE: 70 MMHG | WEIGHT: 182.6 LBS | OXYGEN SATURATION: 97 % | TEMPERATURE: 98.4 F

## 2024-10-31 DIAGNOSIS — Z79.899 HIGH RISK MEDICATION USE: ICD-10-CM

## 2024-10-31 DIAGNOSIS — D69.3 CHRONIC ITP (IDIOPATHIC THROMBOCYTOPENIA) (HCC): ICD-10-CM

## 2024-10-31 DIAGNOSIS — D69.3 CHRONIC ITP (IDIOPATHIC THROMBOCYTOPENIA) (HCC): Primary | ICD-10-CM

## 2024-10-31 LAB
BASOPHILS # BLD: 0.1 K/UL (ref 0–0.1)
BASOPHILS NFR BLD: 1 % (ref 0–1)
DIFFERENTIAL METHOD BLD: ABNORMAL
EOSINOPHIL # BLD: 0.3 K/UL (ref 0–0.4)
EOSINOPHIL NFR BLD: 3 % (ref 0–7)
ERYTHROCYTE [DISTWIDTH] IN BLOOD BY AUTOMATED COUNT: 14.7 % (ref 11.5–14.5)
FERRITIN SERPL-MCNC: 305 NG/ML (ref 8–252)
HCT VFR BLD AUTO: 39 % (ref 35–47)
HGB BLD-MCNC: 13.4 G/DL (ref 11.5–16)
IMM GRANULOCYTES # BLD AUTO: 0 K/UL (ref 0–0.04)
IMM GRANULOCYTES NFR BLD AUTO: 0 % (ref 0–0.5)
IRON SATN MFR SERPL: 35 % (ref 20–50)
IRON SERPL-MCNC: 102 UG/DL (ref 35–150)
LYMPHOCYTES # BLD: 3.3 K/UL (ref 0.8–3.5)
LYMPHOCYTES NFR BLD: 34 % (ref 12–49)
MCH RBC QN AUTO: 29.3 PG (ref 26–34)
MCHC RBC AUTO-ENTMCNC: 34.4 G/DL (ref 30–36.5)
MCV RBC AUTO: 85.3 FL (ref 80–99)
MONOCYTES # BLD: 0.5 K/UL (ref 0–1)
MONOCYTES NFR BLD: 5 % (ref 5–13)
NEUTS SEG # BLD: 5.6 K/UL (ref 1.8–8)
NEUTS SEG NFR BLD: 57 % (ref 32–75)
NRBC # BLD: 0 K/UL (ref 0–0.01)
NRBC BLD-RTO: 0 PER 100 WBC
PLATELET # BLD AUTO: 54 K/UL (ref 150–400)
PMV BLD AUTO: 11 FL (ref 8.9–12.9)
RBC # BLD AUTO: 4.57 M/UL (ref 3.8–5.2)
RBC MORPH BLD: ABNORMAL
TIBC SERPL-MCNC: 289 UG/DL (ref 250–450)
WBC # BLD AUTO: 9.8 K/UL (ref 3.6–11)

## 2024-10-31 PROCEDURE — 83540 ASSAY OF IRON: CPT

## 2024-10-31 PROCEDURE — 83550 IRON BINDING TEST: CPT

## 2024-10-31 PROCEDURE — 99214 OFFICE O/P EST MOD 30 MIN: CPT

## 2024-10-31 PROCEDURE — 82728 ASSAY OF FERRITIN: CPT

## 2024-10-31 PROCEDURE — 85025 COMPLETE CBC W/AUTO DIFF WBC: CPT

## 2024-10-31 PROCEDURE — 36415 COLL VENOUS BLD VENIPUNCTURE: CPT

## 2024-10-31 NOTE — PROGRESS NOTES
Pt arrived to [facility] for labs in stable condition. Labs drawn peripherally from the left ac and sent for processing.     Please see pending results in EPIC.    Pt tolerated treatment well. D/Cd from [facility] in no distress.

## 2024-10-31 NOTE — PROGRESS NOTES
Aixa BillyKomal is a 34 y.o. female    Chief Complaint   Patient presents with    Follow-up     Chronic ITP (idiopathic thrombocytopenia)       1. Have you been to the ER, urgent care clinic since your last visit?  Hospitalized since your last visit?No    2. Have you seen or consulted any other health care providers outside of the [facility] since your last visit?  Include any pap smears or colon screening. No

## 2024-10-31 NOTE — TELEPHONE ENCOUNTER
Left VM letting patient know that her plt are low as suspected due to being off the Promacta for 3 weeks.   Arranged for a recheck in ~1 week 11/7 at 3:30 pm adult infusion center. Please return call to confirm.

## 2024-11-01 NOTE — TELEPHONE ENCOUNTER
Verified patient ID x 2    Spoke with patient and relayed that her plts are low as suspected due to being off the Promacta for 3 weeks. Arranged for a recheck in ~1 week 11/7 at 3:30 pm adult infusion center. Patient confirmed.

## 2024-11-07 ENCOUNTER — APPOINTMENT (OUTPATIENT)
Facility: HOSPITAL | Age: 34
End: 2024-11-07
Payer: MEDICAID

## 2024-11-07 ENCOUNTER — HOSPITAL ENCOUNTER (OUTPATIENT)
Facility: HOSPITAL | Age: 34
Setting detail: INFUSION SERIES
Discharge: HOME | End: 2024-11-07
Payer: MEDICAID

## 2024-11-07 DIAGNOSIS — D69.3 CHRONIC ITP (IDIOPATHIC THROMBOCYTOPENIA) (HCC): ICD-10-CM

## 2024-11-07 LAB
BASOPHILS # BLD: 0.1 K/UL (ref 0–0.1)
BASOPHILS NFR BLD: 1 % (ref 0–1)
DIFFERENTIAL METHOD BLD: ABNORMAL
EOSINOPHIL # BLD: 0.3 K/UL (ref 0–0.4)
EOSINOPHIL NFR BLD: 3 % (ref 0–7)
ERYTHROCYTE [DISTWIDTH] IN BLOOD BY AUTOMATED COUNT: 14.5 % (ref 11.5–14.5)
HCT VFR BLD AUTO: 37.4 % (ref 35–47)
HGB BLD-MCNC: 12.9 G/DL (ref 11.5–16)
IMM GRANULOCYTES # BLD AUTO: 0 K/UL (ref 0–0.04)
IMM GRANULOCYTES NFR BLD AUTO: 0 % (ref 0–0.5)
LYMPHOCYTES # BLD: 3.4 K/UL (ref 0.8–3.5)
LYMPHOCYTES NFR BLD: 37 % (ref 12–49)
MCH RBC QN AUTO: 29.3 PG (ref 26–34)
MCHC RBC AUTO-ENTMCNC: 34.5 G/DL (ref 30–36.5)
MCV RBC AUTO: 84.8 FL (ref 80–99)
MONOCYTES # BLD: 0.5 K/UL (ref 0–1)
MONOCYTES NFR BLD: 5 % (ref 5–13)
NEUTS SEG # BLD: 4.9 K/UL (ref 1.8–8)
NEUTS SEG NFR BLD: 54 % (ref 32–75)
NRBC # BLD: 0 K/UL (ref 0–0.01)
NRBC BLD-RTO: 0 PER 100 WBC
PLATELET # BLD AUTO: 83 K/UL (ref 150–400)
PMV BLD AUTO: 12.4 FL (ref 8.9–12.9)
RBC # BLD AUTO: 4.41 M/UL (ref 3.8–5.2)
RBC MORPH BLD: ABNORMAL
WBC # BLD AUTO: 9.2 K/UL (ref 3.6–11)

## 2024-11-07 PROCEDURE — 85025 COMPLETE CBC W/AUTO DIFF WBC: CPT

## 2024-11-07 PROCEDURE — 36415 COLL VENOUS BLD VENIPUNCTURE: CPT

## 2024-11-07 NOTE — PROGRESS NOTES
[facility] Short Note                       Date: 2024    Name: Aixa Whitehead    MRN: [id number]         : 1990      1530 Pt admit to [facility] for Labs ambulatory in stable condition. Assessment completed. No new concerns voiced.  Labs drawn peripherally from L AC and sent to lab for processing      Ms. Whitehead was [facility] in stable condition. Pt aware of next apt    Future Appointments   Date Time Provider Department Center   2025 11:00 AM Sabina Pop MD [facility] BS AMB       Aziza Wright RN  2024  3:40 PM   
flu-like symptoms

## 2024-11-08 ENCOUNTER — TELEPHONE (OUTPATIENT)
Age: 34
End: 2024-11-08

## 2024-11-08 NOTE — TELEPHONE ENCOUNTER
Session ID: [id number]  Language: Latvian   ID: #[id number]   Name: Vik  ------------------------------------------  Called patient assisted with setting up labs every 2 weeks. Appts Confirmed.

## 2024-11-08 NOTE — TELEPHONE ENCOUNTER
Session code: 86094    Let patient know per provider that her platelets have improved to 83k.   We will recheck again in 2 weeks in OPI. Then she needs monthly labs in [facility].  will call to set these appts.     Please check in with her on the Promacta. Is she getting low? Did she request a refill?  Patient received a 30 day supply last week so she has sufficient right now. Advised patient to call for a refill when she has a week's supply left, so that they have sufficient time to mail it to her before she runs out.     Patient verbalized understanding.

## 2024-11-14 ENCOUNTER — APPOINTMENT (OUTPATIENT)
Facility: HOSPITAL | Age: 34
End: 2024-11-14
Payer: MEDICAID

## 2024-11-22 ENCOUNTER — HOSPITAL ENCOUNTER (OUTPATIENT)
Facility: HOSPITAL | Age: 34
Setting detail: INFUSION SERIES
Discharge: HOME | End: 2024-11-22
Payer: MEDICAID

## 2024-11-22 DIAGNOSIS — D69.3 CHRONIC ITP (IDIOPATHIC THROMBOCYTOPENIA) (HCC): ICD-10-CM

## 2024-11-22 LAB
BASOPHILS # BLD: 0.1 K/UL (ref 0–0.1)
BASOPHILS NFR BLD: 1 % (ref 0–1)
DIFFERENTIAL METHOD BLD: ABNORMAL
EOSINOPHIL # BLD: 0.3 K/UL (ref 0–0.4)
EOSINOPHIL NFR BLD: 3 % (ref 0–7)
ERYTHROCYTE [DISTWIDTH] IN BLOOD BY AUTOMATED COUNT: 14 % (ref 11.5–14.5)
HCT VFR BLD AUTO: 37.7 % (ref 35–47)
HGB BLD-MCNC: 12.9 G/DL (ref 11.5–16)
IMM GRANULOCYTES # BLD AUTO: 0.1 K/UL (ref 0–0.04)
IMM GRANULOCYTES NFR BLD AUTO: 1 % (ref 0–0.5)
LYMPHOCYTES # BLD: 3.3 K/UL (ref 0.8–3.5)
LYMPHOCYTES NFR BLD: 34 % (ref 12–49)
MCH RBC QN AUTO: 29.7 PG (ref 26–34)
MCHC RBC AUTO-ENTMCNC: 34.2 G/DL (ref 30–36.5)
MCV RBC AUTO: 86.7 FL (ref 80–99)
MONOCYTES # BLD: 0.8 K/UL (ref 0–1)
MONOCYTES NFR BLD: 8 % (ref 5–13)
NEUTS SEG # BLD: 5.2 K/UL (ref 1.8–8)
NEUTS SEG NFR BLD: 53 % (ref 32–75)
NRBC # BLD: 0 K/UL (ref 0–0.01)
NRBC BLD-RTO: 0 PER 100 WBC
PLATELET # BLD AUTO: 32 K/UL (ref 150–400)
RBC # BLD AUTO: 4.35 M/UL (ref 3.8–5.2)
RBC MORPH BLD: ABNORMAL
WBC # BLD AUTO: 9.8 K/UL (ref 3.6–11)

## 2024-11-22 PROCEDURE — 85025 COMPLETE CBC W/AUTO DIFF WBC: CPT

## 2024-11-22 PROCEDURE — 36415 COLL VENOUS BLD VENIPUNCTURE: CPT

## 2024-11-22 NOTE — PROGRESS NOTES
[facility] Short Note                       Date: 2024    Name: Aixa Whitehead    MRN: [id number]         : 1990      1530 Pt admit to [facility] for Labs ambulatory in stable condition. Assessment completed. No new concerns voiced.  Labs drawn peripherally from L AC - sent to lab for processing      Ms. Whitehead was discharged [facility] in stable condition. Pt aware of next appt    Future Appointments   Date Time Provider Department Center   2024  3:30 PM [facility] LAB CHAIR 1 [facility]   2025  3:30 PM [facility] LAB CHAIR 1 [facility]   2025 11:00 AM Sabina Pop MD [facility] BS AMB       Aziza Wright RN  2024  3:40 PM

## 2024-11-25 ENCOUNTER — TELEPHONE (OUTPATIENT)
Age: 34
End: 2024-11-25

## 2024-11-25 DIAGNOSIS — D69.3 CHRONIC ITP (IDIOPATHIC THROMBOCYTOPENIA) (HCC): Primary | ICD-10-CM

## 2024-11-25 NOTE — TELEPHONE ENCOUNTER
Verified patient ID x 2    [facility] labs called with critical value.     Platelets 32 on 11/22

## 2024-11-25 NOTE — TELEPHONE ENCOUNTER
Verified patient ID x 2    Session code: [id number]     Can you call patient and see if she is taking Promacta 75 mg daily?  -Patient reports taking the promacta 75 daily    Any missed doses recently?  -No missed doses    Any s/s of bleeding?  -slight bleeding when brushing teeth recently   -Denies blood in stool/urine, easy bruising, and nose bleeds  -No lightheadness/dizziness

## 2024-11-25 NOTE — TELEPHONE ENCOUNTER
Verified patient ID x 2    Let patient know the provider would like a lab recheck tomorrow. PEDS opic at 1:30 pm. Patient accepted. Shared location of peds opic. No further needs.

## 2024-11-26 ENCOUNTER — HOSPITAL ENCOUNTER (OUTPATIENT)
Facility: HOSPITAL | Age: 34
Setting detail: INFUSION SERIES
Discharge: HOME | End: 2024-11-26
Payer: MEDICAID

## 2024-11-26 DIAGNOSIS — D69.3 CHRONIC ITP (IDIOPATHIC THROMBOCYTOPENIA) (HCC): ICD-10-CM

## 2024-11-26 LAB
BASOPHILS # BLD: 0.1 K/UL (ref 0–0.1)
BASOPHILS NFR BLD: 1 % (ref 0–1)
DIFFERENTIAL METHOD BLD: NORMAL
EOSINOPHIL # BLD: 0.2 K/UL (ref 0–0.4)
EOSINOPHIL NFR BLD: 2 % (ref 0–7)
ERYTHROCYTE [DISTWIDTH] IN BLOOD BY AUTOMATED COUNT: 13.9 % (ref 11.5–14.5)
HCT VFR BLD AUTO: 37.9 % (ref 35–47)
HGB BLD-MCNC: 12.9 G/DL (ref 11.5–16)
IMM GRANULOCYTES # BLD AUTO: 0 K/UL (ref 0–0.04)
IMM GRANULOCYTES NFR BLD AUTO: 0 % (ref 0–0.5)
LYMPHOCYTES # BLD: 3.4 K/UL (ref 0.8–3.5)
LYMPHOCYTES NFR BLD: 33 % (ref 12–49)
MCH RBC QN AUTO: 29.6 PG (ref 26–34)
MCHC RBC AUTO-ENTMCNC: 34 G/DL (ref 30–36.5)
MCV RBC AUTO: 86.9 FL (ref 80–99)
MONOCYTES # BLD: 0.6 K/UL (ref 0–1)
MONOCYTES NFR BLD: 6 % (ref 5–13)
NEUTS SEG # BLD: 5.9 K/UL (ref 1.8–8)
NEUTS SEG NFR BLD: 58 % (ref 32–75)
NRBC # BLD: 0 K/UL (ref 0–0.01)
NRBC BLD-RTO: 0 PER 100 WBC
PLATELET # BLD AUTO: 158 K/UL (ref 150–400)
PMV BLD AUTO: 11 FL (ref 8.9–12.9)
RBC # BLD AUTO: 4.36 M/UL (ref 3.8–5.2)
WBC # BLD AUTO: 10.3 K/UL (ref 3.6–11)

## 2024-11-26 PROCEDURE — 36415 COLL VENOUS BLD VENIPUNCTURE: CPT

## 2024-11-26 PROCEDURE — 85025 COMPLETE CBC W/AUTO DIFF WBC: CPT

## 2024-11-26 NOTE — PROGRESS NOTES
[facility] Lab visit:     1345: Patient arrived ambulatory and in no distress.  Labs drawn per Mariama Celestin RN. Departed [facility] ambulatory and in no distress.    Labs: See EPIC for pending results.  No results found for this or any previous visit (from the past 12 hour(s)).

## 2024-11-27 ENCOUNTER — TELEPHONE (OUTPATIENT)
Age: 34
End: 2024-11-27

## 2024-11-27 NOTE — TELEPHONE ENCOUNTER
Verified patient ID x 2    Let patient know per provider that her platelet recheck showed plt 158. Continue Promacta 75 mg daily. We will plan to recheck as scheduled, 12/20.    Please call with symptoms of bleeding.     Patient reported that she had a cold with fever for 3 days so maybe that is why her platelets dropped?    Provided the number to the pharmacy to call for a refill. No further needs.

## 2024-12-06 ENCOUNTER — APPOINTMENT (OUTPATIENT)
Facility: HOSPITAL | Age: 34
End: 2024-12-06
Payer: MEDICAID

## 2024-12-09 ENCOUNTER — TELEPHONE (OUTPATIENT)
Age: 34
End: 2024-12-09

## 2024-12-09 NOTE — TELEPHONE ENCOUNTER
unable to reach pt on c# called pt spouse c# with  to confirm upcoming appts and ayaka visit with ov     rep - adilene  id # [id number]

## 2024-12-20 ENCOUNTER — HOSPITAL ENCOUNTER (OUTPATIENT)
Facility: HOSPITAL | Age: 34
Setting detail: INFUSION SERIES
Discharge: HOME | End: 2024-12-20
Payer: MEDICAID

## 2024-12-20 DIAGNOSIS — D69.3 CHRONIC ITP (IDIOPATHIC THROMBOCYTOPENIA) (HCC): ICD-10-CM

## 2024-12-20 LAB
BASOPHILS # BLD: 0.1 K/UL (ref 0–0.1)
BASOPHILS NFR BLD: 1 % (ref 0–1)
DIFFERENTIAL METHOD BLD: ABNORMAL
EOSINOPHIL # BLD: 0.3 K/UL (ref 0–0.4)
EOSINOPHIL NFR BLD: 3 % (ref 0–7)
ERYTHROCYTE [DISTWIDTH] IN BLOOD BY AUTOMATED COUNT: 12.8 % (ref 11.5–14.5)
HCT VFR BLD AUTO: 37 % (ref 35–47)
HGB BLD-MCNC: 12.7 G/DL (ref 11.5–16)
IMM GRANULOCYTES # BLD AUTO: 0 K/UL (ref 0–0.04)
IMM GRANULOCYTES NFR BLD AUTO: 0 % (ref 0–0.5)
LYMPHOCYTES # BLD: 3.9 K/UL (ref 0.8–3.5)
LYMPHOCYTES NFR BLD: 35 % (ref 12–49)
MCH RBC QN AUTO: 30.7 PG (ref 26–34)
MCHC RBC AUTO-ENTMCNC: 34.3 G/DL (ref 30–36.5)
MCV RBC AUTO: 89.4 FL (ref 80–99)
MONOCYTES # BLD: 0.6 K/UL (ref 0–1)
MONOCYTES NFR BLD: 6 % (ref 5–13)
NEUTS SEG # BLD: 6.1 K/UL (ref 1.8–8)
NEUTS SEG NFR BLD: 55 % (ref 32–75)
NRBC # BLD: 0 K/UL (ref 0–0.01)
NRBC BLD-RTO: 0 PER 100 WBC
PLATELET # BLD AUTO: 119 K/UL (ref 150–400)
PMV BLD AUTO: 11.8 FL (ref 8.9–12.9)
RBC # BLD AUTO: 4.14 M/UL (ref 3.8–5.2)
WBC # BLD AUTO: 11 K/UL (ref 3.6–11)

## 2024-12-20 PROCEDURE — 85025 COMPLETE CBC W/AUTO DIFF WBC: CPT

## 2024-12-20 PROCEDURE — 36415 COLL VENOUS BLD VENIPUNCTURE: CPT

## 2024-12-20 NOTE — PROGRESS NOTES
Aixa Whitehead presents to [facility] in no distress for peripheral lab draw. Labs drawn via R arm. Patient tolerated well; labs sent for processing.  Patient discharged in stable condition and is aware of future appointments.    There were no vitals filed for this visit.      Brianna Mireles RN

## 2024-12-23 ENCOUNTER — TELEPHONE (OUTPATIENT)
Age: 34
End: 2024-12-23

## 2024-12-23 NOTE — TELEPHONE ENCOUNTER
Verified patient ID x 2    Let patient know that her platelets are currently 119. We will continue with Promacta and monthly monitoring for now. Next lab check 1/31. Patient verbalized understanding.

## 2025-01-03 ENCOUNTER — APPOINTMENT (OUTPATIENT)
Facility: HOSPITAL | Age: 35
End: 2025-01-03
Payer: MEDICAID

## 2025-01-31 ENCOUNTER — HOSPITAL ENCOUNTER (OUTPATIENT)
Facility: HOSPITAL | Age: 35
Setting detail: INFUSION SERIES
Discharge: HOME | End: 2025-01-31
Payer: MEDICAID

## 2025-01-31 ENCOUNTER — OFFICE VISIT (OUTPATIENT)
Age: 35
End: 2025-01-31
Payer: MEDICAID

## 2025-01-31 VITALS
HEART RATE: 71 BPM | RESPIRATION RATE: 19 BRPM | DIASTOLIC BLOOD PRESSURE: 71 MMHG | TEMPERATURE: 98.2 F | OXYGEN SATURATION: 97 % | BODY MASS INDEX: 40.08 KG/M2 | WEIGHT: 185.2 LBS | SYSTOLIC BLOOD PRESSURE: 111 MMHG

## 2025-01-31 DIAGNOSIS — D69.3 CHRONIC ITP (IDIOPATHIC THROMBOCYTOPENIA) (HCC): ICD-10-CM

## 2025-01-31 DIAGNOSIS — Z78.9 CONDITION INFLUENCING HEALTH STATUS: Primary | ICD-10-CM

## 2025-01-31 DIAGNOSIS — Z79.899 HIGH RISK MEDICATION USE: ICD-10-CM

## 2025-01-31 PROBLEM — D62 ACUTE BLOOD LOSS ANEMIA (ABLA): Status: RESOLVED | Noted: 2024-04-12 | Resolved: 2025-01-31

## 2025-01-31 LAB
BASOPHILS # BLD: 0.08 K/UL (ref 0–0.1)
BASOPHILS NFR BLD: 0.9 % (ref 0–1)
DIFFERENTIAL METHOD BLD: ABNORMAL
EOSINOPHIL # BLD: 0.23 K/UL (ref 0–0.4)
EOSINOPHIL NFR BLD: 2.6 % (ref 0–7)
ERYTHROCYTE [DISTWIDTH] IN BLOOD BY AUTOMATED COUNT: 12 % (ref 11.5–14.5)
HCT VFR BLD AUTO: 38 % (ref 35–47)
HGB BLD-MCNC: 12.8 G/DL (ref 11.5–16)
IMM GRANULOCYTES # BLD AUTO: 0.04 K/UL (ref 0–0.04)
IMM GRANULOCYTES NFR BLD AUTO: 0.4 % (ref 0–0.5)
LYMPHOCYTES # BLD: 3.09 K/UL (ref 0.8–3.5)
LYMPHOCYTES NFR BLD: 34.6 % (ref 12–49)
MCH RBC QN AUTO: 30.1 PG (ref 26–34)
MCHC RBC AUTO-ENTMCNC: 33.7 G/DL (ref 30–36.5)
MCV RBC AUTO: 89.4 FL (ref 80–99)
MONOCYTES # BLD: 0.37 K/UL (ref 0–1)
MONOCYTES NFR BLD: 4.1 % (ref 5–13)
NEUTS SEG # BLD: 5.12 K/UL (ref 1.8–8)
NEUTS SEG NFR BLD: 57.4 % (ref 32–75)
NRBC # BLD: 0 K/UL (ref 0–0.01)
NRBC BLD-RTO: 0 PER 100 WBC
PLATELET # BLD AUTO: 106 K/UL (ref 150–400)
PMV BLD AUTO: 12 FL (ref 8.9–12.9)
RBC # BLD AUTO: 4.25 M/UL (ref 3.8–5.2)
WBC # BLD AUTO: 8.9 K/UL (ref 3.6–11)

## 2025-01-31 PROCEDURE — 85025 COMPLETE CBC W/AUTO DIFF WBC: CPT

## 2025-01-31 PROCEDURE — 99214 OFFICE O/P EST MOD 30 MIN: CPT | Performed by: INTERNAL MEDICINE

## 2025-01-31 PROCEDURE — 36415 COLL VENOUS BLD VENIPUNCTURE: CPT

## 2025-01-31 RX ORDER — ELTROMBOPAG OLAMINE 75 MG/1
75 TABLET, FILM COATED ORAL DAILY
Qty: 30 TABLET | Refills: 4 | Status: ACTIVE | OUTPATIENT
Start: 2025-01-31

## 2025-01-31 RX ORDER — ELTROMBOPAG OLAMINE 75 MG/1
75 TABLET, FILM COATED ORAL DAILY
Qty: 30 TABLET | Refills: 2 | Status: SHIPPED | OUTPATIENT
Start: 2025-01-31 | End: 2025-01-31

## 2025-01-31 NOTE — PROGRESS NOTES
Aixa BillyRoniBarkley is a 34 y.o. female    Chief Complaint   Patient presents with    Follow-up     follow-up of ITP and anemia       1. Have you been to the ER, urgent care clinic since your last visit?  Hospitalized since your last visit?No    2. Have you seen or consulted any other health care providers outside of the [facility] since your last visit?  Include any pap smears or colon screening. No    
  - Space out labs to every 2 months  - Counseled on alarm symptoms   - Return to clinic in 2 months with labs prior to visit; can consider spacing these out in the future if ongoing good adherence    #) Iron deficiency anemia, resolved:  Due to menorrhagia. Unable to tolerate oral iron. Status post Injectafer x2 doses (8/22, 8/29/24). Repeat iron studies without complete resolution of WILI     #) Barriers to health:   Able to obtain medication through free-drug approval. Advised her to call our office if any issues with refill/med delivery of Promacta in the future.   -  following    Patient to call or T[employer] message with any questions or concerns.     I appreciate the opportunity to participate in Ms. Aixa Whitehead's care.    Signed By: Sabina Pop MD     Interval History:   Chart reviewed, interval events since last visit noted.     History obtained via .  Presents for follow up. Remains on Promacta 75 mg daily.  She has not missed any doses of Promacta. She has only 4 tablets left.   No bleeding concerns - epistaxis, gum bleeding, easy bruising, melena, hematochezia, or heavy vaginal bleeding.    Medications reviewed in the EMR.  No Known Allergies     Review of Systems: A 6-point review of systems was obtained, negative except as reviewed in the HPI.    Physical Exam:     /71   Pulse 71   Temp 98.2 °F (36.8 °C)   Resp 19   Wt 84 kg (185 lb 3.2 oz)   SpO2 97%   BMI 40.08 kg/m²     General: No distress  Eyes: Anicteric sclerae  Respiratory: normal respiratory effort, no wheezes  CV: RRR, no murmurs, No peripheral edema  Skin: No rashes, ecchymoses, or petechiae  Psych: Alert, oriented, appropriate affect, normal judgment/insight    Results:     Lab Results   Component Value Date/Time    WBC 11.0 12/20/2024 03:44 PM    HGB 12.7 12/20/2024 03:44 PM    HCT 37.0 12/20/2024 03:44 PM     12/20/2024 03:44 PM    MCV 89.4 12/20/2024 03:44 PM     Lab Results

## 2025-01-31 NOTE — PROGRESS NOTES
Pt arrived to [facility] for labs in stable condition. Labs drawn peripherally from the left ac and sent for processing.     Please see pending results in EPIC.    Pt tolerated treatment well. D/Cd from [facility] in no distress.

## 2025-02-03 ENCOUNTER — TELEPHONE (OUTPATIENT)
Age: 35
End: 2025-02-03

## 2025-02-03 NOTE — TELEPHONE ENCOUNTER
Verified patient ID x 2    Called patient to check in regarding promacta delivery. She has not heard from the pharmacy yet. Provided patient the number to [facility] pharmacy and advised her to call today to schedule delivery. RN will call later today/tomorrow to ensure they were able to schedule delivery.     Patient inquired about 1/31 plt results. Reviewed they are stable at 106. Patient verbalized understanding.     [facility] med[facility]  Phone: [phone]

## 2025-02-04 NOTE — TELEPHONE ENCOUNTER
Verified patient ID x 2    Spoke with patient. She shared she was unable to speak with someone at the pharmacy. She called, but no one answered after multiple times. RN will call pharmacy.

## 2025-02-04 NOTE — TELEPHONE ENCOUNTER
Verified patient ID x 2    Spoke with patient and let her know that delivery is scheduled for Friday. No further needs.

## 2025-02-10 ENCOUNTER — TELEPHONE (OUTPATIENT)
Age: 35
End: 2025-02-10

## 2025-02-10 NOTE — TELEPHONE ENCOUNTER
confirmed updated appt schedule with pt for march and ov using language  [facility] id-[id number]

## 2025-03-26 NOTE — PROGRESS NOTES
[facility]  Medical Oncology   [phone]    Hematology / Oncology Follow-up    Reason for Visit:   Aixa Whitehead is a 34 y.o. female who is seen for follow-up of ITP and anemia.    Hematology Treatment History:    1/2024: presented with platelets 6k. Received dexamethasone (1/27 - 1/30/24) and IVIG x 2 doses.   4/12/2024: platelets 4k. Treated with dexamethasone (4/12 - 4/15/24) and IVIG x 2 doses  4/29/24: platelets 22k. Started prednisone 1 mg/kg (80 mg) with plan for taper. Repeat platelets on 5/3 with platelets 11k. Switched to dexamethasone 40 mg x 4 days (5/6 - 5/9/24)  5/16/24: plt 22k  5/23/24: IVIG x1 dose. Plan to proceed with promacta but could not obtain due to access issues.   6/7/24: plt 20k   6/10/24: initiated on weekly Romiplastim  7/7/24: Switched to Promacta d/t patient preference  11/2024: platelets 32k; patient had been off Promacta x 3 weeks (ran out)    Assessment/Plan:   #) Chronic relapsed/refractory ITP:  Treatment history as above.   Steroid and IVIG responsive but quick relapses on taper.  She is not a candidate for Rituximab given prior hepatitis B infection.   Plan was to initiate promacta but could not obtain medication due to access issues. Therefore, we started  weekly Nplate 6/10/24. She had response to Nplate (plt 20k --> 68k) but switched to Promacta 7/7/24 due to patient preference. She has now received free-drug approval for Promacta.    She presents for follow up. Currently on Promacta 75 mg daily. She is feeling well with no bleeding concerns. Platelets stable at 136 today. We will continue with current dose of Promacta and lab check every 2 months. She is at risk of compliance issues/lapses in medication adherence, so we will continue with close follow up in office for now      - Continue Promacta to 75 mg daily   - Continue labs every 2 months  - Counseled on alarm symptoms   - Return to clinic in 2 months with labs prior to visit    #) Fatigue:

## 2025-03-27 DIAGNOSIS — D69.3 CHRONIC ITP (IDIOPATHIC THROMBOCYTOPENIA) (HCC): Primary | ICD-10-CM

## 2025-03-31 ENCOUNTER — RESULTS FOLLOW-UP (OUTPATIENT)
Age: 35
End: 2025-03-31

## 2025-03-31 ENCOUNTER — HOSPITAL ENCOUNTER (OUTPATIENT)
Facility: HOSPITAL | Age: 35
Setting detail: INFUSION SERIES
Discharge: HOME | End: 2025-03-31
Payer: MEDICAID

## 2025-03-31 ENCOUNTER — OFFICE VISIT (OUTPATIENT)
Age: 35
End: 2025-03-31
Payer: MEDICAID

## 2025-03-31 VITALS
HEART RATE: 83 BPM | DIASTOLIC BLOOD PRESSURE: 77 MMHG | TEMPERATURE: 98.4 F | SYSTOLIC BLOOD PRESSURE: 121 MMHG | RESPIRATION RATE: 18 BRPM | OXYGEN SATURATION: 98 %

## 2025-03-31 VITALS
RESPIRATION RATE: 18 BRPM | TEMPERATURE: 98.4 F | WEIGHT: 186 LBS | HEART RATE: 82 BPM | BODY MASS INDEX: 40.25 KG/M2 | DIASTOLIC BLOOD PRESSURE: 78 MMHG | OXYGEN SATURATION: 99 % | SYSTOLIC BLOOD PRESSURE: 120 MMHG

## 2025-03-31 DIAGNOSIS — D69.3 CHRONIC ITP (IDIOPATHIC THROMBOCYTOPENIA) (HCC): ICD-10-CM

## 2025-03-31 DIAGNOSIS — Z79.899 HIGH RISK MEDICATION USE: ICD-10-CM

## 2025-03-31 DIAGNOSIS — R53.83 OTHER FATIGUE: ICD-10-CM

## 2025-03-31 DIAGNOSIS — D69.3 CHRONIC ITP (IDIOPATHIC THROMBOCYTOPENIA) (HCC): Primary | ICD-10-CM

## 2025-03-31 LAB
BASOPHILS # BLD: 0.13 K/UL (ref 0–0.1)
BASOPHILS NFR BLD: 1.2 % (ref 0–1)
DIFFERENTIAL METHOD BLD: ABNORMAL
EOSINOPHIL # BLD: 0.27 K/UL (ref 0–0.4)
EOSINOPHIL NFR BLD: 2.5 % (ref 0–7)
ERYTHROCYTE [DISTWIDTH] IN BLOOD BY AUTOMATED COUNT: 12.3 % (ref 11.5–14.5)
FERRITIN SERPL-MCNC: 144 NG/ML (ref 26–388)
HCT VFR BLD AUTO: 38.6 % (ref 35–47)
HGB BLD-MCNC: 13.3 G/DL (ref 11.5–16)
IMM GRANULOCYTES # BLD AUTO: 0.09 K/UL (ref 0–0.04)
IMM GRANULOCYTES NFR BLD AUTO: 0.8 % (ref 0–0.5)
IRON SATN MFR SERPL: 62 % (ref 20–50)
IRON SERPL-MCNC: 200 UG/DL (ref 35–150)
LYMPHOCYTES # BLD: 3.84 K/UL (ref 0.8–3.5)
LYMPHOCYTES NFR BLD: 35.4 % (ref 12–49)
MCH RBC QN AUTO: 29.8 PG (ref 26–34)
MCHC RBC AUTO-ENTMCNC: 34.5 G/DL (ref 30–36.5)
MCV RBC AUTO: 86.4 FL (ref 80–99)
MONOCYTES # BLD: 0.52 K/UL (ref 0–1)
MONOCYTES NFR BLD: 4.8 % (ref 5–13)
NEUTS SEG # BLD: 6 K/UL (ref 1.8–8)
NEUTS SEG NFR BLD: 55.3 % (ref 32–75)
NRBC # BLD: 0 K/UL (ref 0–0.01)
NRBC BLD-RTO: 0 PER 100 WBC
PLATELET # BLD AUTO: 136 K/UL (ref 150–400)
PMV BLD AUTO: 12.4 FL (ref 8.9–12.9)
RBC # BLD AUTO: 4.47 M/UL (ref 3.8–5.2)
TIBC SERPL-MCNC: 324 UG/DL (ref 250–450)
WBC # BLD AUTO: 10.9 K/UL (ref 3.6–11)

## 2025-03-31 PROCEDURE — 83540 ASSAY OF IRON: CPT

## 2025-03-31 PROCEDURE — 85025 COMPLETE CBC W/AUTO DIFF WBC: CPT

## 2025-03-31 PROCEDURE — 99213 OFFICE O/P EST LOW 20 MIN: CPT

## 2025-03-31 PROCEDURE — 83550 IRON BINDING TEST: CPT

## 2025-03-31 PROCEDURE — 82728 ASSAY OF FERRITIN: CPT

## 2025-03-31 ASSESSMENT — PAIN SCALES - GENERAL: PAINLEVEL_OUTOF10: 0

## 2025-03-31 NOTE — PROGRESS NOTES
[facility] Progress Note    Date: 2025    Name: Aixa Whitehead    MRN: [id number]         : 1990      1120:  Pt arrived ambulatory and in no distress, for lab visit.  Labs drawn via L AC, patient tolerated well.      Departed [facility] ambulatory and in no distress.    Future Appointments   Date Time Provider Department Center   2025 10:30 AM [facility] LAB CHAIR 1 [facility]   2025 10:30 AM [facility] LAB CHAIR 1 [facility]   9/15/2025 10:30 AM [facility] LAB CHAIR 1 [facility]       Julia Hernandez RN  2025

## 2025-03-31 NOTE — PROGRESS NOTES
[facility] Peds/Adult Note                       Date: 2025    Name: Aixa Whitehead    MRN: [id number]         : 1990    1215 Patient arrives for Labs without acute problems. Please see Epic for complete assessment and education provided.    Vital signs stable throughout and prior to discharge. Patient tolerated procedure well and was discharged without incident.  Patient is aware of next [facility] appointment on 2025.  Appointment card give to the Patient.       Ms. Whitehead's vitals were reviewed prior to and after treatment.   Patient Vitals for the past 12 hrs:   Temp Pulse Resp BP SpO2   25 1215 98.4 °F (36.9 °C) 83 18 121/77 98 %         Lab results were obtained and reviewed.  Labs Pending, please see [facility] for results.    Recent Results (from the past 12 hours)   CBC with Auto Differential    Collection Time: 25 11:25 AM   Result Value Ref Range    WBC 10.9 3.6 - 11.0 K/uL    RBC 4.47 3.80 - 5.20 M/uL    Hemoglobin 13.3 11.5 - 16.0 g/dL    Hematocrit 38.6 35.0 - 47.0 %    MCV 86.4 80.0 - 99.0 FL    MCH 29.8 26.0 - 34.0 PG    MCHC 34.5 30.0 - 36.5 g/dL    RDW 12.3 11.5 - 14.5 %    Platelets 136 (L) 150 - 400 K/uL    MPV 12.4 8.9 - 12.9 FL    Nucleated RBCs 0.0 0  WBC    nRBC 0.00 0.00 - 0.01 K/uL    Neutrophils % 55.3 32.0 - 75.0 %    Lymphocytes % 35.4 12.0 - 49.0 %    Monocytes % 4.8 (L) 5.0 - 13.0 %    Eosinophils % 2.5 0.0 - 7.0 %    Basophils % 1.2 (H) 0.0 - 1.0 %    Immature Granulocytes % 0.8 (H) 0.0 - 0.5 %    Neutrophils Absolute 6.00 1.80 - 8.00 K/UL    Lymphocytes Absolute 3.84 (H) 0.80 - 3.50 K/UL    Monocytes Absolute 0.52 0.00 - 1.00 K/UL    Eosinophils Absolute 0.27 0.00 - 0.40 K/UL    Basophils Absolute 0.13 (H) 0.00 - 0.10 K/UL    Immature Granulocytes Absolute 0.09 (H) 0.00 - 0.04 K/UL    Differential Type AUTOMATED           Ms. Whitehead tolerated the lab draw, and had no complaints.    Ms. Whitehead was discharged from Outpatient

## 2025-03-31 NOTE — TELEPHONE ENCOUNTER
Session code: 49243    Verified patient ID x 2    Called patient using language services. Advised per provider that:    - Plt stable at 136    - Iron studies with elevated iron. No signs of recurrent WILI.   Is she taking oral iron?   Patient reports not taking oral iron or any supplements with iron  Recommended per provider rechecking iron studies in 2 weeks.    Patient would like to come to [facility] for the lab recheck. Advised infusion  will call to discuss lab appt in two weeks. Patient verbalized understanding.

## 2025-03-31 NOTE — PROGRESS NOTES
Aixa BillyKomal is a 34 y.o. female    Chief Complaint   Patient presents with    Follow-up     Chronic ITP (idiopathic thrombocytopenia)       1. Have you been to the ER, urgent care clinic since your last visit?  Hospitalized since your last visit?No    2. Have you seen or consulted any other health care providers outside of the [facility] since your last visit?  Include any pap smears or colon screening. No

## 2025-04-02 DIAGNOSIS — R53.83 OTHER FATIGUE: ICD-10-CM

## 2025-04-02 DIAGNOSIS — D69.3 CHRONIC ITP (IDIOPATHIC THROMBOCYTOPENIA) (HCC): Primary | ICD-10-CM

## 2025-04-03 NOTE — TELEPHONE ENCOUNTER
Called Pt via  . M reminder for Pt of upcoming lab appt on 4/15 at 10:30am. Advised on VM that Dr. Pop would like Pt to fast for those labs.   1

## 2025-04-15 ENCOUNTER — RESULTS FOLLOW-UP (OUTPATIENT)
Age: 35
End: 2025-04-15

## 2025-04-15 ENCOUNTER — HOSPITAL ENCOUNTER (OUTPATIENT)
Facility: HOSPITAL | Age: 35
Setting detail: INFUSION SERIES
Discharge: HOME | End: 2025-04-15
Payer: MEDICAID

## 2025-04-15 DIAGNOSIS — R53.83 OTHER FATIGUE: ICD-10-CM

## 2025-04-15 DIAGNOSIS — D69.3 CHRONIC ITP (IDIOPATHIC THROMBOCYTOPENIA) (HCC): ICD-10-CM

## 2025-04-15 LAB
BASOPHILS # BLD: 0.08 K/UL (ref 0–0.1)
BASOPHILS NFR BLD: 0.9 % (ref 0–1)
DIFFERENTIAL METHOD BLD: ABNORMAL
EOSINOPHIL # BLD: 0.27 K/UL (ref 0–0.4)
EOSINOPHIL NFR BLD: 3 % (ref 0–7)
ERYTHROCYTE [DISTWIDTH] IN BLOOD BY AUTOMATED COUNT: 12.4 % (ref 11.5–14.5)
HCT VFR BLD AUTO: 38.1 % (ref 35–47)
HGB BLD-MCNC: 12.9 G/DL (ref 11.5–16)
IMM GRANULOCYTES # BLD AUTO: 0.01 K/UL (ref 0–0.04)
IMM GRANULOCYTES NFR BLD AUTO: 0.1 % (ref 0–0.5)
IRON SATN MFR SERPL: 29 % (ref 20–50)
IRON SERPL-MCNC: 95 UG/DL (ref 35–150)
LYMPHOCYTES # BLD: 3.13 K/UL (ref 0.8–3.5)
LYMPHOCYTES NFR BLD: 34.9 % (ref 12–49)
MCH RBC QN AUTO: 29.4 PG (ref 26–34)
MCHC RBC AUTO-ENTMCNC: 33.9 G/DL (ref 30–36.5)
MCV RBC AUTO: 86.8 FL (ref 80–99)
MONOCYTES # BLD: 0.44 K/UL (ref 0–1)
MONOCYTES NFR BLD: 4.9 % (ref 5–13)
NEUTS SEG # BLD: 5.03 K/UL (ref 1.8–8)
NEUTS SEG NFR BLD: 56.2 % (ref 32–75)
NRBC # BLD: 0 K/UL (ref 0–0.01)
NRBC BLD-RTO: 0 PER 100 WBC
PLATELET # BLD AUTO: 129 K/UL (ref 150–400)
PMV BLD AUTO: 11.7 FL (ref 8.9–12.9)
RBC # BLD AUTO: 4.39 M/UL (ref 3.8–5.2)
TIBC SERPL-MCNC: 328 UG/DL (ref 250–450)
WBC # BLD AUTO: 9 K/UL (ref 3.6–11)

## 2025-04-15 PROCEDURE — 83550 IRON BINDING TEST: CPT

## 2025-04-15 PROCEDURE — 83540 ASSAY OF IRON: CPT

## 2025-04-15 PROCEDURE — 85025 COMPLETE CBC W/AUTO DIFF WBC: CPT

## 2025-04-15 NOTE — PROGRESS NOTES
[facility] - Chemotherapy Progress Note    1045 Pt admit to [facility] for lab draw ambulatory in stable condition. Labs drawn peripherally and sent for processing. D/c home ambulatory in no distress. Pt aware of next [facility] appointment scheduled for 05/27/2025.    Please see labs in Results tab

## 2025-04-15 NOTE — TELEPHONE ENCOUNTER
Called Pt via  . Spoke with Pt. Verified ID x2. Advised Pt per provider that her iron labs are normal.   Advised provider recommends follow up with PCP if fatigue persists.   Pt verbalized understanding.

## 2025-05-27 ENCOUNTER — HOSPITAL ENCOUNTER (OUTPATIENT)
Facility: HOSPITAL | Age: 35
Setting detail: INFUSION SERIES
End: 2025-05-27

## 2025-05-27 ENCOUNTER — TELEPHONE (OUTPATIENT)
Age: 35
End: 2025-05-27

## 2025-05-28 NOTE — PROGRESS NOTES
[facility]  Medical Oncology   [phone]    Hematology / Oncology Follow-up    Reason for Visit:   Aixa Whitehead is a 34 y.o. female who is seen for follow-up of ITP and anemia.    Hematology Treatment History:    1/2024: presented with platelets 6k. Received dexamethasone (1/27 - 1/30/24) and IVIG x 2 doses.   4/12/2024: platelets 4k. Treated with dexamethasone (4/12 - 4/15/24) and IVIG x 2 doses  4/29/24: platelets 22k. Started prednisone 1 mg/kg (80 mg) with plan for taper. Repeat platelets on 5/3 with platelets 11k. Switched to dexamethasone 40 mg x 4 days (5/6 - 5/9/24)  5/16/24: plt 22k  5/23/24: IVIG x1 dose. Plan to proceed with promacta but could not obtain due to access issues.   6/7/24: plt 20k   6/10/24: initiated on weekly Romiplastim  7/7/24: Switched to Promacta d/t patient preference  11/2024: platelets 32k; patient had been off Promacta x 3 weeks (ran out)    Assessment/Plan:   #) Chronic relapsed/refractory ITP:  Treatment history as above.   Steroid and IVIG responsive but quick relapses on taper.  She is not a candidate for Rituximab given prior hepatitis B infection.   Plan was to initiate promacta but could not obtain medication due to access issues. Therefore, we started  weekly Nplate 6/10/24. She had response to Nplate (plt 20k --> 68k) but switched to Promacta 7/7/24 due to patient preference. She has now received free-drug approval for Promacta.    She presents for follow up. Currently on Promacta 75 mg daily. She is feeling well with no bleeding concerns. Platelets today 73k. She stopped taking the Promacta for ~2 weeks because she thought it was causing her to vomit. She restarted Promacta one week ago and has had no issues with recurrent vomiting. We will continue with current dose of Promacta and recheck labs in 2-3 weeks. If stable, we will space out lab monitoring to every 3 months.     - Continue Promacta to 75 mg daily   - Recheck CBC in 2-3 weeks.

## 2025-06-02 ENCOUNTER — CLINICAL DOCUMENTATION (OUTPATIENT)
Age: 35
End: 2025-06-02

## 2025-06-02 ENCOUNTER — OFFICE VISIT (OUTPATIENT)
Age: 35
End: 2025-06-02
Payer: MEDICAID

## 2025-06-02 ENCOUNTER — HOSPITAL ENCOUNTER (OUTPATIENT)
Facility: HOSPITAL | Age: 35
Setting detail: INFUSION SERIES
Discharge: HOME | End: 2025-06-02
Payer: MEDICAID

## 2025-06-02 ENCOUNTER — TELEPHONE (OUTPATIENT)
Age: 35
End: 2025-06-02

## 2025-06-02 VITALS
WEIGHT: 187 LBS | BODY MASS INDEX: 40.47 KG/M2 | SYSTOLIC BLOOD PRESSURE: 119 MMHG | HEART RATE: 80 BPM | TEMPERATURE: 98.2 F | DIASTOLIC BLOOD PRESSURE: 80 MMHG | OXYGEN SATURATION: 97 % | RESPIRATION RATE: 18 BRPM

## 2025-06-02 DIAGNOSIS — Z79.899 HIGH RISK MEDICATION USE: ICD-10-CM

## 2025-06-02 DIAGNOSIS — D69.3 CHRONIC ITP (IDIOPATHIC THROMBOCYTOPENIA) (HCC): Primary | ICD-10-CM

## 2025-06-02 DIAGNOSIS — R53.83 OTHER FATIGUE: ICD-10-CM

## 2025-06-02 LAB
BASOPHILS # BLD: 0.07 K/UL (ref 0–0.1)
BASOPHILS NFR BLD: 0.8 % (ref 0–1)
DIFFERENTIAL METHOD BLD: ABNORMAL
EOSINOPHIL # BLD: 0.2 K/UL (ref 0–0.4)
EOSINOPHIL NFR BLD: 2.4 % (ref 0–7)
ERYTHROCYTE [DISTWIDTH] IN BLOOD BY AUTOMATED COUNT: 12.6 % (ref 11.5–14.5)
HCT VFR BLD AUTO: 36 % (ref 35–47)
HGB BLD-MCNC: 12.4 G/DL (ref 11.5–16)
IMM GRANULOCYTES # BLD AUTO: 0.03 K/UL (ref 0–0.04)
IMM GRANULOCYTES NFR BLD AUTO: 0.4 % (ref 0–0.5)
LYMPHOCYTES # BLD: 2.91 K/UL (ref 0.8–3.5)
LYMPHOCYTES NFR BLD: 35.1 % (ref 12–49)
MCH RBC QN AUTO: 29.8 PG (ref 26–34)
MCHC RBC AUTO-ENTMCNC: 34.4 G/DL (ref 30–36.5)
MCV RBC AUTO: 86.5 FL (ref 80–99)
MONOCYTES # BLD: 0.59 K/UL (ref 0–1)
MONOCYTES NFR BLD: 7.1 % (ref 5–13)
NEUTS SEG # BLD: 4.5 K/UL (ref 1.8–8)
NEUTS SEG NFR BLD: 54.2 % (ref 32–75)
NRBC # BLD: 0 K/UL (ref 0–0.01)
NRBC BLD-RTO: 0 PER 100 WBC
PLATELET # BLD AUTO: 73 K/UL (ref 150–400)
PMV BLD AUTO: 12.7 FL (ref 8.9–12.9)
RBC # BLD AUTO: 4.16 M/UL (ref 3.8–5.2)
RBC MORPH BLD: ABNORMAL
WBC # BLD AUTO: 8.3 K/UL (ref 3.6–11)
WBC MORPH BLD: ABNORMAL

## 2025-06-02 PROCEDURE — 85025 COMPLETE CBC W/AUTO DIFF WBC: CPT

## 2025-06-02 PROCEDURE — 99213 OFFICE O/P EST LOW 20 MIN: CPT

## 2025-06-02 RX ORDER — ONDANSETRON 4 MG/1
4 TABLET, ORALLY DISINTEGRATING ORAL EVERY 8 HOURS PRN
Qty: 60 TABLET | Refills: 0 | Status: SHIPPED
Start: 2025-06-02 | End: 2025-06-03 | Stop reason: SDUPTHER

## 2025-06-02 RX ORDER — ELTROMBOPAG OLAMINE 75 MG/1
75 TABLET, FILM COATED ORAL DAILY
Qty: 90 TABLET | Refills: 2 | Status: ACTIVE | OUTPATIENT
Start: 2025-06-02 | End: 2026-02-27

## 2025-06-02 NOTE — PROGRESS NOTES
[facility] Progress Note    Date: 2025    Name: Aixa Whitehead    MRN: [id number]         : 1990      Pt arrived ambulatory and in no distress, for lab visit.  Labs drawn peripehrally per order and sent for processing.             Departed [facility] ambulatory and in no distress.    Future Appointments   Date Time Provider Department Center   2025  1:30 PM [facility] LAB CHAIR 1 [facility]   2025  1:00 PM [facility] LAB CHAIR 1 [facility]       Zuri Reed RN  2025

## 2025-06-02 NOTE — PROGRESS NOTES
Aixa BillyKomal is a 34 y.o. female    Chief Complaint   Patient presents with    Follow-up     Chronic ITP (idiopathic thrombocytopenia)       1. Have you been to the ER, urgent care clinic since your last visit?  Hospitalized since your last visit?No    2. Have you seen or consulted any other health care providers outside of the [facility] since your last visit?  Include any pap smears or colon screening. No

## 2025-06-02 NOTE — CONSULTS
Session ID: [id number]  Session Duration: 3 minutes  Language: Georgian   ID: #[id number]   Name: Francine

## 2025-06-02 NOTE — TELEPHONE ENCOUNTER
Session code: [id number]      Called patient twice utilizing . Will attempt again tomorrow.         \"Let patient know per provider that plt today are 73. We will arrange for CBC in 2-3 weeks. If stable at that time, we will space out lab monitoring to q3 mo with OV in 6 mo.     Provided number to [facility]  Imelda Nayak for patient to reach out to if she needs help contacting the clinic.\"

## 2025-06-02 NOTE — PROGRESS NOTES
06/01/2025    NOVARTIS PAF  P: [phone] ; F: [phone]  PATIENT ID: [id number]    Submitted re-enrollment FAA for Promacta 75 MG TABS [facility]ARK. Also included most recent financial letter written by patient for proof of income. Awaiting determination. Current enrollment expires 06/19/2025.    06/03/2025    Received fax from [facility] requesting income documents and household size. I submitted a F.A letter stating pt is unemployed and called pt to obtain household siz w/ LS. LVM.    06/04/2025    Re-submitted application with household size of 5 and attached F.A letter. Awaiting determination.

## 2025-06-03 DIAGNOSIS — Z79.899 HIGH RISK MEDICATION USE: ICD-10-CM

## 2025-06-03 DIAGNOSIS — D69.3 CHRONIC ITP (IDIOPATHIC THROMBOCYTOPENIA) (HCC): ICD-10-CM

## 2025-06-03 RX ORDER — ONDANSETRON 4 MG/1
4 TABLET, ORALLY DISINTEGRATING ORAL EVERY 8 HOURS PRN
Qty: 60 TABLET | Refills: 0 | Status: SHIPPED | OUTPATIENT
Start: 2025-06-03

## 2025-06-03 NOTE — TELEPHONE ENCOUNTER
Session code: [id number]    Let patient know per provider that plt today are 73. We will arrange for CBC in 2-3 weeks. If stable at that time, we will space out lab monitoring to q3 mo with OV in 6 mo.      Provided number to [facility]  Imelda Nayak for patient to reach out to if she needs help contacting the clinic.    Patient verbalized understanding.

## 2025-06-06 ENCOUNTER — TELEPHONE (OUTPATIENT)
Age: 35
End: 2025-06-06

## 2025-06-17 ENCOUNTER — RESULTS FOLLOW-UP (OUTPATIENT)
Age: 35
End: 2025-06-17

## 2025-06-17 ENCOUNTER — HOSPITAL ENCOUNTER (OUTPATIENT)
Facility: HOSPITAL | Age: 35
Setting detail: INFUSION SERIES
Discharge: HOME | End: 2025-06-17
Payer: MEDICAID

## 2025-06-17 DIAGNOSIS — R53.83 OTHER FATIGUE: ICD-10-CM

## 2025-06-17 DIAGNOSIS — D69.3 CHRONIC ITP (IDIOPATHIC THROMBOCYTOPENIA) (HCC): Primary | ICD-10-CM

## 2025-06-17 LAB
BASOPHILS # BLD: 0.06 K/UL (ref 0–0.1)
BASOPHILS NFR BLD: 0.6 % (ref 0–1)
DIFFERENTIAL METHOD BLD: ABNORMAL
EOSINOPHIL # BLD: 0.21 K/UL (ref 0–0.4)
EOSINOPHIL NFR BLD: 2.1 % (ref 0–7)
ERYTHROCYTE [DISTWIDTH] IN BLOOD BY AUTOMATED COUNT: 12.3 % (ref 11.5–14.5)
HCT VFR BLD AUTO: 36 % (ref 35–47)
HGB BLD-MCNC: 12.8 G/DL (ref 11.5–16)
IMM GRANULOCYTES # BLD AUTO: 0.05 K/UL (ref 0–0.04)
IMM GRANULOCYTES NFR BLD AUTO: 0.5 % (ref 0–0.5)
LYMPHOCYTES # BLD: 2.8 K/UL (ref 0.8–3.5)
LYMPHOCYTES NFR BLD: 27.7 % (ref 12–49)
MCH RBC QN AUTO: 31.1 PG (ref 26–34)
MCHC RBC AUTO-ENTMCNC: 35.6 G/DL (ref 30–36.5)
MCV RBC AUTO: 87.4 FL (ref 80–99)
MONOCYTES # BLD: 0.55 K/UL (ref 0–1)
MONOCYTES NFR BLD: 5.4 % (ref 5–13)
NEUTS SEG # BLD: 6.44 K/UL (ref 1.8–8)
NEUTS SEG NFR BLD: 63.7 % (ref 32–75)
NRBC # BLD: 0 K/UL (ref 0–0.01)
NRBC BLD-RTO: 0 PER 100 WBC
PLATELET # BLD AUTO: 105 K/UL (ref 150–400)
PMV BLD AUTO: 11.1 FL (ref 8.9–12.9)
RBC # BLD AUTO: 4.12 M/UL (ref 3.8–5.2)
WBC # BLD AUTO: 10.1 K/UL (ref 3.6–11)

## 2025-06-17 PROCEDURE — 85025 COMPLETE CBC W/AUTO DIFF WBC: CPT

## 2025-06-17 NOTE — PROGRESS NOTES
Aixa Whitehead presents to [facility] in no distress for peripheral lab draw. Labs drawn via R arm. Patient tolerated well; labs sent for processing.  Patient discharged in stable condition and is aware of future appointments.      Brianna Mireles RN

## 2025-06-23 ENCOUNTER — APPOINTMENT (OUTPATIENT)
Facility: HOSPITAL | Age: 35
End: 2025-06-23
Payer: MEDICAID

## 2025-07-21 ENCOUNTER — APPOINTMENT (OUTPATIENT)
Facility: HOSPITAL | Age: 35
End: 2025-07-21
Payer: MEDICAID

## 2025-08-18 ENCOUNTER — APPOINTMENT (OUTPATIENT)
Facility: HOSPITAL | Age: 35
End: 2025-08-18
Payer: MEDICAID

## 2025-08-20 ENCOUNTER — TELEPHONE (OUTPATIENT)
Age: 35
End: 2025-08-20

## 2025-09-03 ENCOUNTER — TELEPHONE (OUTPATIENT)
Age: 35
End: 2025-09-03

## 2025-09-04 ENCOUNTER — TELEPHONE (OUTPATIENT)
Age: 35
End: 2025-09-04

## 2025-09-05 ENCOUNTER — CLINICAL DOCUMENTATION (OUTPATIENT)
Age: 35
End: 2025-09-05

## 2025-09-05 ENCOUNTER — TELEPHONE (OUTPATIENT)
Age: 35
End: 2025-09-05

## 2025-09-05 DIAGNOSIS — D69.3 CHRONIC ITP (IDIOPATHIC THROMBOCYTOPENIA) (HCC): Primary | ICD-10-CM

## 2025-09-05 DIAGNOSIS — D69.3 CHRONIC ITP (IDIOPATHIC THROMBOCYTOPENIA) (HCC): ICD-10-CM

## 2025-09-05 DIAGNOSIS — Z79.899 HIGH RISK MEDICATION USE: ICD-10-CM

## 2025-09-05 DIAGNOSIS — R53.83 OTHER FATIGUE: ICD-10-CM

## 2025-09-06 ENCOUNTER — HOSPITAL ENCOUNTER (OUTPATIENT)
Facility: HOSPITAL | Age: 35
Setting detail: INFUSION SERIES
Discharge: HOME | End: 2025-09-06
Payer: MEDICAID

## 2025-09-06 VITALS
RESPIRATION RATE: 18 BRPM | SYSTOLIC BLOOD PRESSURE: 108 MMHG | OXYGEN SATURATION: 97 % | TEMPERATURE: 97.8 F | DIASTOLIC BLOOD PRESSURE: 58 MMHG | HEART RATE: 82 BPM

## 2025-09-06 DIAGNOSIS — R53.83 OTHER FATIGUE: ICD-10-CM

## 2025-09-06 DIAGNOSIS — D69.3 CHRONIC ITP (IDIOPATHIC THROMBOCYTOPENIA) (HCC): ICD-10-CM

## 2025-09-06 LAB
BASOPHILS # BLD: 0.06 K/UL (ref 0–0.1)
BASOPHILS NFR BLD: 0.6 % (ref 0–1)
DIFFERENTIAL METHOD BLD: ABNORMAL
EOSINOPHIL # BLD: 0.27 K/UL (ref 0–0.4)
EOSINOPHIL NFR BLD: 2.7 % (ref 0–7)
ERYTHROCYTE [DISTWIDTH] IN BLOOD BY AUTOMATED COUNT: 13 % (ref 11.5–14.5)
HCT VFR BLD AUTO: 36.4 % (ref 35–47)
HGB BLD-MCNC: 12.5 G/DL (ref 11.5–16)
IMM GRANULOCYTES # BLD AUTO: 0.02 K/UL (ref 0–0.04)
IMM GRANULOCYTES NFR BLD AUTO: 0.2 % (ref 0–0.5)
LYMPHOCYTES # BLD: 3.17 K/UL (ref 0.8–3.5)
LYMPHOCYTES NFR BLD: 31.7 % (ref 12–49)
MCH RBC QN AUTO: 29.3 PG (ref 26–34)
MCHC RBC AUTO-ENTMCNC: 34.3 G/DL (ref 30–36.5)
MCV RBC AUTO: 85.2 FL (ref 80–99)
MONOCYTES # BLD: 0.47 K/UL (ref 0–1)
MONOCYTES NFR BLD: 4.7 % (ref 5–13)
NEUTS SEG # BLD: 6.01 K/UL (ref 1.8–8)
NEUTS SEG NFR BLD: 60.1 % (ref 32–75)
NRBC # BLD: 0 K/UL (ref 0–0.01)
NRBC BLD-RTO: 0 PER 100 WBC
PLATELET # BLD AUTO: 85 K/UL (ref 150–400)
PMV BLD AUTO: 11.5 FL (ref 8.9–12.9)
RBC # BLD AUTO: 4.27 M/UL (ref 3.8–5.2)
RBC MORPH BLD: ABNORMAL
WBC # BLD AUTO: 10 K/UL (ref 3.6–11)

## 2025-09-06 PROCEDURE — 37799 UNLISTED PX VASCULAR SURGERY: CPT

## 2025-09-06 PROCEDURE — 85025 COMPLETE CBC W/AUTO DIFF WBC: CPT

## 2025-09-06 ASSESSMENT — PAIN SCALES - GENERAL: PAINLEVEL_OUTOF10: 0
